# Patient Record
Sex: FEMALE | Race: WHITE | Employment: UNEMPLOYED | URBAN - METROPOLITAN AREA
[De-identification: names, ages, dates, MRNs, and addresses within clinical notes are randomized per-mention and may not be internally consistent; named-entity substitution may affect disease eponyms.]

---

## 2019-04-09 ENCOUNTER — HOSPITAL ENCOUNTER (INPATIENT)
Age: 44
LOS: 3 days | Discharge: HOME | DRG: 418 | End: 2019-04-12
Attending: STUDENT IN AN ORGANIZED HEALTH CARE EDUCATION/TRAINING PROGRAM | Admitting: INTERNAL MEDICINE
Payer: COMMERCIAL

## 2019-04-09 ENCOUNTER — APPOINTMENT (OUTPATIENT)
Dept: ULTRASOUND IMAGING | Age: 44
DRG: 418 | End: 2019-04-09
Attending: STUDENT IN AN ORGANIZED HEALTH CARE EDUCATION/TRAINING PROGRAM
Payer: COMMERCIAL

## 2019-04-09 ENCOUNTER — APPOINTMENT (OUTPATIENT)
Dept: MRI IMAGING | Age: 44
DRG: 418 | End: 2019-04-09
Attending: INTERNAL MEDICINE
Payer: COMMERCIAL

## 2019-04-09 DIAGNOSIS — K85.90 ACUTE PANCREATITIS, UNSPECIFIED COMPLICATION STATUS, UNSPECIFIED PANCREATITIS TYPE: Primary | ICD-10-CM

## 2019-04-09 DIAGNOSIS — K80.21 CALCULUS OF GALLBLADDER WITH BILIARY OBSTRUCTION BUT WITHOUT CHOLECYSTITIS: ICD-10-CM

## 2019-04-09 DIAGNOSIS — K85.10 ACUTE GALLSTONE PANCREATITIS: ICD-10-CM

## 2019-04-09 DIAGNOSIS — Z90.49 S/P LAPAROSCOPIC CHOLECYSTECTOMY: ICD-10-CM

## 2019-04-09 PROBLEM — K21.9 GASTROESOPHAGEAL REFLUX DISEASE WITHOUT ESOPHAGITIS: Status: ACTIVE | Noted: 2019-04-09

## 2019-04-09 PROBLEM — F32.A DEPRESSION: Status: ACTIVE | Noted: 2019-04-09

## 2019-04-09 PROBLEM — R03.0 ELEVATED BP WITHOUT DIAGNOSIS OF HYPERTENSION: Status: ACTIVE | Noted: 2019-04-09

## 2019-04-09 PROBLEM — F98.8 ATTENTION DEFICIT DISORDER (ADD) IN ADULT: Status: ACTIVE | Noted: 2019-04-09

## 2019-04-09 LAB
ALBUMIN SERPL-MCNC: 3.7 G/DL (ref 3.5–5)
ALBUMIN/GLOB SERPL: 1.1 {RATIO} (ref 1.1–2.2)
ALP SERPL-CCNC: 144 U/L (ref 45–117)
ALT SERPL-CCNC: 316 U/L (ref 12–78)
ANION GAP SERPL CALC-SCNC: 4 MMOL/L (ref 5–15)
APPEARANCE UR: ABNORMAL
AST SERPL-CCNC: 441 U/L (ref 15–37)
BACTERIA URNS QL MICRO: NEGATIVE /HPF
BASOPHILS # BLD: 0 K/UL (ref 0–0.1)
BASOPHILS NFR BLD: 0 % (ref 0–1)
BILIRUB SERPL-MCNC: 4.5 MG/DL (ref 0.2–1)
BILIRUB UR QL CFM: POSITIVE
BUN SERPL-MCNC: 12 MG/DL (ref 6–20)
BUN/CREAT SERPL: 16 (ref 12–20)
CALCIUM SERPL-MCNC: 9 MG/DL (ref 8.5–10.1)
CHLORIDE SERPL-SCNC: 107 MMOL/L (ref 97–108)
CO2 SERPL-SCNC: 28 MMOL/L (ref 21–32)
COLOR UR: ABNORMAL
COMMENT, HOLDF: NORMAL
CREAT SERPL-MCNC: 0.77 MG/DL (ref 0.55–1.02)
DIFFERENTIAL METHOD BLD: NORMAL
EOSINOPHIL # BLD: 0.2 K/UL (ref 0–0.4)
EOSINOPHIL NFR BLD: 3 % (ref 0–7)
EPITH CASTS URNS QL MICRO: ABNORMAL /LPF
ERYTHROCYTE [DISTWIDTH] IN BLOOD BY AUTOMATED COUNT: 14.1 % (ref 11.5–14.5)
GLOBULIN SER CALC-MCNC: 3.4 G/DL (ref 2–4)
GLUCOSE SERPL-MCNC: 86 MG/DL (ref 65–100)
GLUCOSE UR STRIP.AUTO-MCNC: NEGATIVE MG/DL
HCG UR QL: NEGATIVE
HCT VFR BLD AUTO: 39 % (ref 35–47)
HGB BLD-MCNC: 12.6 G/DL (ref 11.5–16)
HGB UR QL STRIP: NEGATIVE
IMM GRANULOCYTES # BLD AUTO: 0 K/UL (ref 0–0.04)
IMM GRANULOCYTES NFR BLD AUTO: 0 % (ref 0–0.5)
KETONES UR QL STRIP.AUTO: 15 MG/DL
LACTATE BLD-SCNC: 0.75 MMOL/L (ref 0.4–2)
LEUKOCYTE ESTERASE UR QL STRIP.AUTO: ABNORMAL
LIPASE SERPL-CCNC: >3000 U/L (ref 73–393)
LYMPHOCYTES # BLD: 0.9 K/UL (ref 0.8–3.5)
LYMPHOCYTES NFR BLD: 16 % (ref 12–49)
MCH RBC QN AUTO: 31.7 PG (ref 26–34)
MCHC RBC AUTO-ENTMCNC: 32.3 G/DL (ref 30–36.5)
MCV RBC AUTO: 98 FL (ref 80–99)
MONOCYTES # BLD: 0.4 K/UL (ref 0–1)
MONOCYTES NFR BLD: 7 % (ref 5–13)
NEUTS SEG # BLD: 4.4 K/UL (ref 1.8–8)
NEUTS SEG NFR BLD: 74 % (ref 32–75)
NITRITE UR QL STRIP.AUTO: POSITIVE
NRBC # BLD: 0 K/UL (ref 0–0.01)
NRBC BLD-RTO: 0 PER 100 WBC
PH UR STRIP: 6 [PH] (ref 5–8)
PLATELET # BLD AUTO: 226 K/UL (ref 150–400)
PMV BLD AUTO: 9.7 FL (ref 8.9–12.9)
POTASSIUM SERPL-SCNC: 4.1 MMOL/L (ref 3.5–5.1)
PROT SERPL-MCNC: 7.1 G/DL (ref 6.4–8.2)
PROT UR STRIP-MCNC: 30 MG/DL
RBC # BLD AUTO: 3.98 M/UL (ref 3.8–5.2)
RBC #/AREA URNS HPF: ABNORMAL /HPF (ref 0–5)
SAMPLES BEING HELD,HOLD: NORMAL
SODIUM SERPL-SCNC: 139 MMOL/L (ref 136–145)
SP GR UR REFRACTOMETRY: 1.02 (ref 1–1.03)
UR CULT HOLD, URHOLD: NORMAL
UROBILINOGEN UR QL STRIP.AUTO: 4 EU/DL (ref 0.2–1)
WBC # BLD AUTO: 5.9 K/UL (ref 3.6–11)
WBC URNS QL MICRO: ABNORMAL /HPF (ref 0–4)

## 2019-04-09 PROCEDURE — 96375 TX/PRO/DX INJ NEW DRUG ADDON: CPT

## 2019-04-09 PROCEDURE — 96374 THER/PROPH/DIAG INJ IV PUSH: CPT

## 2019-04-09 PROCEDURE — 74011250636 HC RX REV CODE- 250/636: Performed by: NURSE PRACTITIONER

## 2019-04-09 PROCEDURE — 85025 COMPLETE CBC W/AUTO DIFF WBC: CPT

## 2019-04-09 PROCEDURE — 36415 COLL VENOUS BLD VENIPUNCTURE: CPT

## 2019-04-09 PROCEDURE — 74011250636 HC RX REV CODE- 250/636: Performed by: STUDENT IN AN ORGANIZED HEALTH CARE EDUCATION/TRAINING PROGRAM

## 2019-04-09 PROCEDURE — 80053 COMPREHEN METABOLIC PANEL: CPT

## 2019-04-09 PROCEDURE — 74011250636 HC RX REV CODE- 250/636: Performed by: INTERNAL MEDICINE

## 2019-04-09 PROCEDURE — 83690 ASSAY OF LIPASE: CPT

## 2019-04-09 PROCEDURE — 81001 URINALYSIS AUTO W/SCOPE: CPT

## 2019-04-09 PROCEDURE — 74183 MRI ABD W/O CNTR FLWD CNTR: CPT

## 2019-04-09 PROCEDURE — 77030021566

## 2019-04-09 PROCEDURE — 65270000029 HC RM PRIVATE

## 2019-04-09 PROCEDURE — 76705 ECHO EXAM OF ABDOMEN: CPT

## 2019-04-09 PROCEDURE — 74011250636 HC RX REV CODE- 250/636: Performed by: RADIOLOGY

## 2019-04-09 PROCEDURE — 96361 HYDRATE IV INFUSION ADD-ON: CPT

## 2019-04-09 PROCEDURE — 83605 ASSAY OF LACTIC ACID: CPT

## 2019-04-09 PROCEDURE — 74011250637 HC RX REV CODE- 250/637: Performed by: INTERNAL MEDICINE

## 2019-04-09 PROCEDURE — A9585 GADOBUTROL INJECTION: HCPCS | Performed by: RADIOLOGY

## 2019-04-09 PROCEDURE — 99283 EMERGENCY DEPT VISIT LOW MDM: CPT

## 2019-04-09 PROCEDURE — 81025 URINE PREGNANCY TEST: CPT

## 2019-04-09 RX ORDER — SERTRALINE HYDROCHLORIDE 50 MG/1
100 TABLET, FILM COATED ORAL
Status: DISCONTINUED | OUTPATIENT
Start: 2019-04-09 | End: 2019-04-12 | Stop reason: HOSPADM

## 2019-04-09 RX ORDER — HYDROMORPHONE HYDROCHLORIDE 2 MG/ML
1 INJECTION, SOLUTION INTRAMUSCULAR; INTRAVENOUS; SUBCUTANEOUS
Status: COMPLETED | OUTPATIENT
Start: 2019-04-09 | End: 2019-04-09

## 2019-04-09 RX ORDER — NALOXONE HYDROCHLORIDE 0.4 MG/ML
0.4 INJECTION, SOLUTION INTRAMUSCULAR; INTRAVENOUS; SUBCUTANEOUS AS NEEDED
Status: DISCONTINUED | OUTPATIENT
Start: 2019-04-09 | End: 2019-04-12 | Stop reason: HOSPADM

## 2019-04-09 RX ORDER — ONDANSETRON 2 MG/ML
4 INJECTION INTRAMUSCULAR; INTRAVENOUS
Status: DISCONTINUED | OUTPATIENT
Start: 2019-04-09 | End: 2019-04-12 | Stop reason: HOSPADM

## 2019-04-09 RX ORDER — SODIUM CHLORIDE 0.9 % (FLUSH) 0.9 %
5-40 SYRINGE (ML) INJECTION AS NEEDED
Status: DISCONTINUED | OUTPATIENT
Start: 2019-04-09 | End: 2019-04-12 | Stop reason: HOSPADM

## 2019-04-09 RX ORDER — OMEPRAZOLE 10 MG/1
40 CAPSULE, DELAYED RELEASE ORAL DAILY
COMMUNITY

## 2019-04-09 RX ORDER — FLUOXETINE 10 MG/1
10 CAPSULE ORAL
COMMUNITY

## 2019-04-09 RX ORDER — DEXTROAMPHETAMINE SACCHARATE, AMPHETAMINE ASPARTATE, DEXTROAMPHETAMINE SULFATE AND AMPHETAMINE SULFATE 2.5; 2.5; 2.5; 2.5 MG/1; MG/1; MG/1; MG/1
TABLET ORAL 4 TIMES DAILY
COMMUNITY

## 2019-04-09 RX ORDER — SODIUM CHLORIDE, SODIUM LACTATE, POTASSIUM CHLORIDE, CALCIUM CHLORIDE 600; 310; 30; 20 MG/100ML; MG/100ML; MG/100ML; MG/100ML
250 INJECTION, SOLUTION INTRAVENOUS CONTINUOUS
Status: DISCONTINUED | OUTPATIENT
Start: 2019-04-09 | End: 2019-04-12

## 2019-04-09 RX ORDER — SODIUM CHLORIDE 9 MG/ML
1000 INJECTION, SOLUTION INTRAVENOUS CONTINUOUS
Status: DISCONTINUED | OUTPATIENT
Start: 2019-04-09 | End: 2019-04-12

## 2019-04-09 RX ORDER — ENOXAPARIN SODIUM 100 MG/ML
40 INJECTION SUBCUTANEOUS EVERY 24 HOURS
Status: DISCONTINUED | OUTPATIENT
Start: 2019-04-09 | End: 2019-04-09

## 2019-04-09 RX ORDER — ONDANSETRON 2 MG/ML
4 INJECTION INTRAMUSCULAR; INTRAVENOUS
Status: COMPLETED | OUTPATIENT
Start: 2019-04-09 | End: 2019-04-09

## 2019-04-09 RX ORDER — HYDROMORPHONE HYDROCHLORIDE 2 MG/ML
1 INJECTION, SOLUTION INTRAMUSCULAR; INTRAVENOUS; SUBCUTANEOUS
Status: DISCONTINUED | OUTPATIENT
Start: 2019-04-09 | End: 2019-04-12 | Stop reason: HOSPADM

## 2019-04-09 RX ORDER — SERTRALINE HYDROCHLORIDE 100 MG/1
100 TABLET, FILM COATED ORAL
COMMUNITY
End: 2021-03-04 | Stop reason: ALTCHOICE

## 2019-04-09 RX ORDER — ACETAMINOPHEN 325 MG/1
650 TABLET ORAL
Status: DISCONTINUED | OUTPATIENT
Start: 2019-04-09 | End: 2019-04-12 | Stop reason: HOSPADM

## 2019-04-09 RX ORDER — SODIUM CHLORIDE 0.9 % (FLUSH) 0.9 %
5-40 SYRINGE (ML) INJECTION EVERY 8 HOURS
Status: DISCONTINUED | OUTPATIENT
Start: 2019-04-09 | End: 2019-04-12 | Stop reason: HOSPADM

## 2019-04-09 RX ORDER — LORAZEPAM 2 MG/ML
1 INJECTION INTRAMUSCULAR
Status: DISCONTINUED | OUTPATIENT
Start: 2019-04-09 | End: 2019-04-12 | Stop reason: HOSPADM

## 2019-04-09 RX ORDER — FLUOXETINE 10 MG/1
10 CAPSULE ORAL
Status: DISCONTINUED | OUTPATIENT
Start: 2019-04-09 | End: 2019-04-12 | Stop reason: HOSPADM

## 2019-04-09 RX ADMIN — HYDROMORPHONE HYDROCHLORIDE 1 MG: 2 INJECTION INTRAMUSCULAR; INTRAVENOUS; SUBCUTANEOUS at 18:51

## 2019-04-09 RX ADMIN — HYDROMORPHONE HYDROCHLORIDE 1 MG: 2 INJECTION INTRAMUSCULAR; INTRAVENOUS; SUBCUTANEOUS at 14:30

## 2019-04-09 RX ADMIN — ONDANSETRON 4 MG: 2 INJECTION INTRAMUSCULAR; INTRAVENOUS at 19:45

## 2019-04-09 RX ADMIN — SODIUM CHLORIDE, SODIUM LACTATE, POTASSIUM CHLORIDE, AND CALCIUM CHLORIDE 250 ML: 600; 310; 30; 20 INJECTION, SOLUTION INTRAVENOUS at 17:59

## 2019-04-09 RX ADMIN — ONDANSETRON 4 MG: 2 INJECTION INTRAMUSCULAR; INTRAVENOUS at 14:30

## 2019-04-09 RX ADMIN — SODIUM CHLORIDE 1000 ML: 900 INJECTION, SOLUTION INTRAVENOUS at 18:00

## 2019-04-09 RX ADMIN — GADOBUTROL 7 ML: 604.72 INJECTION INTRAVENOUS at 21:28

## 2019-04-09 RX ADMIN — PROMETHAZINE HYDROCHLORIDE 25 MG: 25 INJECTION INTRAMUSCULAR; INTRAVENOUS at 16:18

## 2019-04-09 RX ADMIN — FLUOXETINE 10 MG: 10 CAPSULE ORAL at 21:57

## 2019-04-09 RX ADMIN — SERTRALINE HYDROCHLORIDE 100 MG: 50 TABLET ORAL at 21:57

## 2019-04-09 RX ADMIN — SODIUM CHLORIDE 1000 ML: 900 INJECTION, SOLUTION INTRAVENOUS at 21:58

## 2019-04-09 RX ADMIN — Medication 10 ML: at 21:57

## 2019-04-09 RX ADMIN — FAMOTIDINE 20 MG: 10 INJECTION, SOLUTION INTRAVENOUS at 21:57

## 2019-04-09 RX ADMIN — SODIUM CHLORIDE 1000 ML: 900 INJECTION, SOLUTION INTRAVENOUS at 14:31

## 2019-04-09 NOTE — ED TRIAGE NOTES
Pt arrives to ED with bilateral upper abdominal pain. Pt with nausea, diarrhea, denies vomiting, fever, or chills. Pt also with decreased urination. Pt with hx of pancreatitis and gastric sleeve.

## 2019-04-09 NOTE — ED NOTES
TRANSFER - OUT REPORT: 
 
Verbal report given to [address] [place] [address] RN(name) on Odalys Lepe  being transferred to Medical(unit) for routine progression of care Report consisted of patients Situation, Background, Assessment and  
Recommendations(SBAR). Information from the following report(s) SBAR was reviewed with the receiving nurse. Lines:  
Peripheral IV 04/09/19 Right Antecubital (Active) Site Assessment Clean, dry, & intact 4/9/2019  1:05 PM  
Phlebitis Assessment 0 4/9/2019  1:05 PM  
Infiltration Assessment 0 4/9/2019  1:05 PM  
Dressing Status Clean, dry, & intact 4/9/2019  1:05 PM  
Dressing Type Transparent;Tape 4/9/2019  1:05 PM  
Hub Color/Line Status Pink 4/9/2019  1:05 PM  
  
 
Opportunity for questions and clarification was provided. Patient transported with: 
 [facility]

## 2019-04-09 NOTE — ED PROVIDER NOTES
41 y.o. female with past medical history significant for pancreatitis who presents from home via private vehicle with chief complaint of abdominal pain. Patient began with \"severe\" abdominal pain radiating to back, nausea, and diarrhea yesterday that is similar to her previous episodes of pancreatitis. She was seen at [facility], given a Toradol shot, and referred to ED for possible admission. She was last admitted 09/2018 for pancreatitis. Patient notes episodes are normally triggered by liquor consumption, but she denies recent consumption - she has consumed white wine. She also noted decreased urinary frequency today, and trends rapid weight loss since last being admitted. Specifically denies vomiting, fever, and any other pain or symptoms. There are no other acute medical concerns at this time. Social hx: Never tobacco smoker; Occasional EtOH use; Denies hx EtOH abuse PCP: No primary care provider on file. Note written by Delonte Parsons, as dictated by Lisa Lira MD 12:52 PM 
 
The history is provided by the patient. No  was used. Pt denies fevers, chills, night sweats, chest pain, pressure, SOB, SMITH, PND, orthopnea, melena, hematuria, constipation, HA, dizziness, and syncope. No past medical history on file. No past surgical history on file. No family history on file. Social History Socioeconomic History  Marital status: SINGLE Spouse name: Not on file  Number of children: Not on file  Years of education: Not on file  Highest education level: Not on file Occupational History  Not on file Social Needs  Financial resource strain: Not on file  Food insecurity:  
  Worry: Not on file Inability: Not on file  Transportation needs:  
  Medical: Not on file Non-medical: Not on file Tobacco Use  Smoking status: Never Smoker Substance and Sexual Activity  Alcohol use:  Yes  
  Drug use: Not on file  Sexual activity: Not on file Lifestyle  Physical activity:  
  Days per week: Not on file Minutes per session: Not on file  Stress: Not on file Relationships  Social connections:  
  Talks on phone: Not on file Gets together: Not on file Attends Synagogue service: Not on file Active member of club or organization: Not on file Attends meetings of clubs or organizations: Not on file Relationship status: Not on file  Intimate partner violence:  
  Fear of current or ex partner: Not on file Emotionally abused: Not on file Physically abused: Not on file Forced sexual activity: Not on file Other Topics Concern  Not on file Social History Narrative  Not on file ALLERGIES: Patient has no known allergies. Review of Systems Constitutional: Positive for unexpected weight change. Negative for activity change, diaphoresis, fatigue and fever. HENT: Negative for congestion and sore throat. Eyes: Negative for photophobia and visual disturbance. Respiratory: Negative for chest tightness and shortness of breath. Cardiovascular: Negative for chest pain, palpitations and leg swelling. Gastrointestinal: Positive for abdominal pain, diarrhea and nausea. Negative for blood in stool, constipation and vomiting. Genitourinary: Positive for frequency (\"decreased\"). Negative for difficulty urinating, dysuria, flank pain and hematuria. Musculoskeletal: Positive for back pain. Neurological: Negative for dizziness, syncope, numbness and headaches. All other systems reviewed and are negative. Vitals:  
 04/09/19 1254 04/09/19 1629 BP: (!) 151/97 Pulse: 76 Resp: 18 Temp: 98.1 °F (36.7 °C) SpO2: 96% 96% Weight: 77.1 kg (170 lb) Height: 5' 8\" (1.727 m) Physical Exam  
Constitutional: She is oriented to person, place, and time. She appears well-developed and well-nourished. No distress. HENT:  
Head: Normocephalic and atraumatic. Nose: Nose normal.  
Mouth/Throat: Oropharynx is clear and moist. No oropharyngeal exudate. Eyes: Conjunctivae and EOM are normal. Right eye exhibits no discharge. Left eye exhibits no discharge. No scleral icterus. Neck: Normal range of motion. Neck supple. No JVD present. No tracheal deviation present. No thyromegaly present. Cardiovascular: Normal rate, regular rhythm, normal heart sounds and intact distal pulses. Exam reveals no gallop and no friction rub. No murmur heard. Pulmonary/Chest: Effort normal and breath sounds normal. No stridor. No respiratory distress. She has no wheezes. She has no rales. She exhibits no tenderness. Abdominal: Bowel sounds are normal. She exhibits no distension and no mass. There is tenderness in the epigastric area. There is no rebound. Pt exhibits epigastric tenderness. Musculoskeletal: Normal range of motion. She exhibits no edema or tenderness. Lymphadenopathy:  
  She has no cervical adenopathy. Neurological: She is alert and oriented to person, place, and time. No cranial nerve deficit. Coordination normal.  
Skin: Skin is warm and dry. No rash noted. She is not diaphoretic. No erythema. No pallor. Psychiatric: She has a normal mood and affect. Her behavior is normal. Judgment and thought content normal.  
Nursing note and vitals reviewed. Note written by Delonte Pearson, as dictated by Karthikeyan Altamirano MD 12:52 PM 
 
MDM Number of Diagnoses or Management Options Diagnosis management comments:  
 * routine laboratory data and UA 
 * IVF * Consult to GI 
 * Analgesia and Phenergan Amount and/or Complexity of Data Reviewed Clinical lab tests: ordered and reviewed Tests in the radiology section of CPT®: reviewed and ordered Discussion of test results with the performing providers: yes Review and summarize past medical records: yes Discuss the patient with other providers: yes Risk of Complications, Morbidity, and/or Mortality General comments:  
 - stable, ambulatory pt in NAD Patient Progress Patient progress: stable Procedures CONSULT NOTE:  
4:36 PM 
Violeta Cedillo NP spoke with Dr. Lala Jones, Specialty: GI 
Discussed pt's hx, disposition, and available diagnostic and imaging results. Reviewed care plans. Consultant agrees with plans as outlined. MD to see. Violeta Cedillo NP 
 
  
 
CONSULT NOTE:  
4:37 PM 
Violeta Cedillo NP spoke with Dr. Tawanda Briceno MD, Specialty: Hospitalist 
Discussed pt's hx, disposition, and available diagnostic and imaging results. Reviewed care plans. Consultant agrees with plans as outlined. Admit to inpat. Violeta Cedillo NP 
 
 
4:37 PM 
Patient is being admitted to the hospital.  The results of their tests and reasons for their admission have been discussed with them and/or available family. They convey agreement and understanding for the need to be admitted and for their admission diagnosis. Consultation has been made with the inpatient physician specialist for hospitalization. LABORATORY TESTS: 
Recent Results (from the past 12 hour(s)) CBC WITH AUTOMATED DIFF Collection Time: 04/09/19  1:06 PM  
Result Value Ref Range WBC 5.9 3.6 - 11.0 K/uL  
 RBC 3.98 3.80 - 5.20 M/uL  
 HGB 12.6 11.5 - 16.0 g/dL HCT 39.0 35.0 - 47.0 % MCV 98.0 80.0 - 99.0 FL  
 MCH 31.7 26.0 - 34.0 PG  
 MCHC 32.3 30.0 - 36.5 g/dL  
 RDW 14.1 11.5 - 14.5 % PLATELET 548 108 - 211 K/uL MPV 9.7 8.9 - 12.9 FL  
 NRBC 0.0 0  WBC ABSOLUTE NRBC 0.00 0.00 - 0.01 K/uL NEUTROPHILS 74 32 - 75 % LYMPHOCYTES 16 12 - 49 % MONOCYTES 7 5 - 13 % EOSINOPHILS 3 0 - 7 % BASOPHILS 0 0 - 1 % IMMATURE GRANULOCYTES 0 0.0 - 0.5 % ABS. NEUTROPHILS 4.4 1.8 - 8.0 K/UL  
 ABS. LYMPHOCYTES 0.9 0.8 - 3.5 K/UL  
 ABS. MONOCYTES 0.4 0.0 - 1.0 K/UL ABS. EOSINOPHILS 0.2 0.0 - 0.4 K/UL  
 ABS. BASOPHILS 0.0 0.0 - 0.1 K/UL  
 ABS. IMM. GRANS. 0.0 0.00 - 0.04 K/UL  
 DF AUTOMATED METABOLIC PANEL, COMPREHENSIVE Collection Time: 04/09/19  1:06 PM  
Result Value Ref Range Sodium 139 136 - 145 mmol/L Potassium 4.1 3.5 - 5.1 mmol/L Chloride 107 97 - 108 mmol/L  
 CO2 28 21 - 32 mmol/L Anion gap 4 (L) 5 - 15 mmol/L Glucose 86 65 - 100 mg/dL BUN 12 6 - 20 MG/DL Creatinine 0.77 0.55 - 1.02 MG/DL  
 BUN/Creatinine ratio 16 12 - 20 GFR est AA >60 >60 ml/min/1.73m2 GFR est non-AA >60 >60 ml/min/1.73m2 Calcium 9.0 8.5 - 10.1 MG/DL Bilirubin, total 4.5 (H) 0.2 - 1.0 MG/DL  
 ALT (SGPT) 316 (H) 12 - 78 U/L  
 AST (SGOT) 441 (H) 15 - 37 U/L Alk. phosphatase 144 (H) 45 - 117 U/L Protein, total 7.1 6.4 - 8.2 g/dL Albumin 3.7 3.5 - 5.0 g/dL Globulin 3.4 2.0 - 4.0 g/dL A-G Ratio 1.1 1.1 - 2.2 LIPASE Collection Time: 04/09/19  1:06 PM  
Result Value Ref Range Lipase >3,000 (H) 73 - 393 U/L  
SAMPLES BEING HELD Collection Time: 04/09/19  1:06 PM  
Result Value Ref Range SAMPLES BEING HELD 1RED,1SST   
 COMMENT Add-on orders for these samples will be processed based on acceptable specimen integrity and analyte stability, which may vary by analyte. URINALYSIS W/MICROSCOPIC Collection Time: 04/09/19  1:12 PM  
Result Value Ref Range Color KEEGAN Appearance TURBID (A) CLEAR Specific gravity 1.025 1.003 - 1.030    
 pH (UA) 6.0 5.0 - 8.0 Protein 30 (A) NEG mg/dL Glucose NEGATIVE  NEG mg/dL Ketone 15 (A) NEG mg/dL Blood NEGATIVE  NEG Urobilinogen 4.0 (H) 0.2 - 1.0 EU/dL Nitrites POSITIVE (A) NEG Leukocyte Esterase MODERATE (A) NEG    
 WBC 0-4 0 - 4 /hpf  
 RBC 0-5 0 - 5 /hpf Epithelial cells FEW FEW /lpf Bacteria NEGATIVE  NEG /hpf URINE CULTURE HOLD SAMPLE Collection Time: 04/09/19  1:12 PM  
Result Value Ref Range Urine culture hold URINE ON HOLD IN MICROBIOLOGY DEPT FOR 3 DAYS. IF UNPRESERVED URINE IS SUBMITTED, IT CANNOT BE USED FOR ADDITIONAL TESTING AFTER 24 HRS, RECOLLECTION WILL BE REQUIRED. BILIRUBIN, CONFIRM Collection Time: 04/09/19  1:12 PM  
Result Value Ref Range Bilirubin UA, confirm POSITIVE (A) NEG    
HCG URINE, QL. - POC Collection Time: 04/09/19  1:15 PM  
Result Value Ref Range Pregnancy test,urine (POC) NEGATIVE  NEG    
POC LACTIC ACID Collection Time: 04/09/19  4:17 PM  
Result Value Ref Range Lactic Acid (POC) 0.75 0.40 - 2.00 mmol/L IMAGING RESULTS: 
US ABD LTD Final Result IMPRESSION: Cholecystolithiasis with tenderness on direct insonation of the  
gallbladder but no other sonographic signs of cholecystitis. : Bilateral duct  
dilated to 10 mm consider further evaluation with MRCP/ERCP. [id number] Result Date: 4/9/2019 EXAM: Right upper quadrant limited abdominal ultrasound. CLINICAL INDICATION:  Evaluate for cholelithiasis. TECHNIQUE: High-resolution selected color flow evaluation of the right upper quadrant performed. COMPARISON:  None. FINDINGS: The gallbladder contains stones within the lumen at the gallbladder neck but otherwise appears normal with no wall thickening, pericholecystic fluid, or other abnormality. The patient reported pain with direct insonation of the gallbladder. The common bile duct is dilated and measures 10 mm. The liver is unremarkable with no focal lesion or intrahepatic biliary ductal dilation. There is hepatopetal portal venous flow within the liver. The pancreas appears normal with no identified mass or ductal dilation. The right kidney appears normal with no identified calculus, mass, or hydronephrosis. Right renal length measures 10.1 cm.   
 
IMPRESSION: Cholecystolithiasis with tenderness on direct insonation of the gallbladder but no other sonographic signs of cholecystitis. : Bilateral duct dilated to 10 mm consider further evaluation with MRCP/ERCP. MEDICATIONS GIVEN: 
Medications  
lactated ringers bolus infusion 1,000 mL (1,000 mL IntraVENous Continued On Admission 4/9/19 1619)  
0.9% sodium chloride infusion 1,000 mL (has no administration in time range)  
sodium chloride (NS) flush 5-40 mL (has no administration in time range)  
sodium chloride (NS) flush 5-40 mL (has no administration in time range)  
naloxone (NARCAN) injection 0.4 mg (has no administration in time range) LORazepam (ATIVAN) injection 1 mg (has no administration in time range)  
enoxaparin (LOVENOX) injection 40 mg (has no administration in time range) HYDROmorphone (PF) (DILAUDID) injection 1 mg (has no administration in time range)  
acetaminophen (TYLENOL) tablet 650 mg (has no administration in time range)  
ondansetron (ZOFRAN) injection 4 mg (has no administration in time range)  
lactated Ringers infusion 250 mL (has no administration in time range)  
sodium chloride 0.9 % bolus infusion 1,000 mL (0 mL IntraVENous IV Completed 4/9/19 1619)  
ondansetron (ZOFRAN) injection 4 mg (4 mg IntraVENous Given 4/9/19 1430) HYDROmorphone (PF) (DILAUDID) injection 1 mg (1 mg IntraVENous Given 4/9/19 1430) promethazine (PHENERGAN) 25 mg in NS IVPB (25 mg IntraVENous New Bag 4/9/19 1618) IMPRESSION: 
1. Acute pancreatitis, unspecified complication status, unspecified pancreatitis type PLAN: 
1.  Admit to [facility], [address], [place] 
4:37 PM

## 2019-04-09 NOTE — PROGRESS NOTES
[facility]: MED RECONCILIATION Comments/Recommendations:  
Patient alert and oriented for medication reconciliation and knowledgeable regarding medications. Patient does not think she took any medications today, took all yesterday. Confirmed NKDA and updated preferred pharmacy as [facility]. Medications added:  
 
none Medications removed: 
 
none Medications adjusted: Added directions for Adderall as BID PRN-patient only takes when working Information obtained from: patient, Rx query Allergies: Patient has no known allergies. Prior to Admission Medications:  
 
Medication Documentation Review Audit Reviewed by Clyde Hearn (Pharmacist) on 04/09/19 at 2413 Medication Sig Documenting Provider Last Dose Status Taking?  
dextroamphetamine-amphetamine (ADDERALL) 10 mg tablet Take 10 mg by mouth two (2) times daily as needed. Johana Streeter MD 4/2/2019 Active Yes Med Note (MARÍA DEGROOT Apr 9, 2019  3:47 PM) Patient only takes when she is working. FLUoxetine (PROZAC) 10 mg capsule Take 10 mg by mouth nightly. Johana Streeter MD 4/8/2019 PM Active Yes  
omeprazole (PRILOSEC) 10 mg capsule Take 10 mg by mouth daily. Johana Streeter MD 4/8/2019 AM Active Yes  
sertraline (ZOLOFT) 100 mg tablet Take 100 mg by mouth nightly. Johana Streeter MD 4/8/2019 PM Active Yes Thank Washington Dorsey, PharmD, BCPS   Contact: [phone]

## 2019-04-09 NOTE — H&P
[address] 19 
[phone] Admission History and Physical 
 
 
NAME:              Marion Peña :   1975 MRN:  [id number] PCP:  Jennelle Cabot, MD  
 
Date:     2019 Chief  Complaint: Acute abdominal pain History Of Presenting Illness: Ms. Marlon Staples is a 41 y.o. female who is being admitted for acute gallstone pancreatitis. Ms. Marlon Staples presented to our Emergency Department today complaining of a sudden onset of severe upper abdominal aching pain, radiating to her back and associated with nausea but no vomiting. She denies any chills or fever but has felt generalized itchiness. She says she only drinks alcohol socially. In the ED, she was found to have acute pancreatitis with an elevated lipase and an abdominal ultrasound showed cholecystolithiasis with tenderness on direct insonation of the gallbladder but no other sonographic signs of cholecystitis. : Bilateral duct dilated to 10 mm consider further evaluation with MRCP/ERCP. She will be admitted for further management. No Known Allergies Prior to Admission medications Medication Sig Start Date End Date Taking? Authorizing Provider  
sertraline (ZOLOFT) 100 mg tablet Take 100 mg by mouth nightly. Yes Ferdinand, MD Johana  
FLUoxetine (PROZAC) 10 mg capsule Take 10 mg by mouth nightly. Yes Ferdinand, MD Johana  
dextroamphetamine-amphetamine (ADDERALL) 10 mg tablet Take 10 mg by mouth two (2) times daily as needed. Yes Ferdinand, MD Johana  
omeprazole (PRILOSEC) 10 mg capsule Take 10 mg by mouth daily. Yes Ferdinand, MD Johana  
 
 
Past Medical History:  
Diagnosis Date  Gastrointestinal disorder  Hypertension  Psychiatric disorder Past Surgical History:  
Procedure Laterality Date  HX GI    
 gastric sleeve Social History Tobacco Use  Smoking status: Never Smoker  Smokeless tobacco: Never Used Substance Use Topics  Alcohol use: Yes Family History Problem Relation Age of Onset  Diabetes Neg Hx  Hypertension Neg Hx Review of Systems: 
Constitutional ROS: no fever, chills, rigors or night sweats Respiratory ROS: no cough, sputum, hemoptysis, dyspnea or pleuritic pain. Cardiovascular ROS: no chest pain, palpitations, orthopnea, PND or syncope Endocrine ROS: no polydispsia, polyuria, heat or cold intolerance or major weight change. Gastrointestinal ROS: no dysphagia but abdominal pain, nausea and no vomiting Genito-Urinary ROS: no dysuria, frequency, hematuria, retention or flank pain Musculoskeletal ROS: no joint pain, swelling or muscular tenderness Neurological ROS: no headache, confusion, focal weakness or any other neurological symptoms Psychiatric ROS: no depression, anxiety, mood swings Dermatological ROS: no rash, pruritis, or urticaria Heme-Lymph ROS: no swollen glands, bleeding Examination: 
 
Constitutional:   
Visit Vitals BP (!) 145/94 Pulse 76 Temp 98.1 °F (36.7 °C) Resp 18 Ht 5' 8\" (1.727 m) Wt 77.1 kg (170 lb) LMP 03/19/2019 (Approximate) SpO2 98% BMI 25.85 kg/m² General:  Weak and ill looking patient in no acute distress Eyes: icteric conjunctivae, PERRLA with no discharge. Normal eye movements Ear, Nose, Mouth & Throat: No ottorrhea, rhinorrhea, non tender sinuses, dry mucous membranes Respiratory:  No accessory muscle use, clear breath sounds without crackles or wheezes Cardiovascular:  No JVD or murmurs, regular and normal S1, S2 without thrills, bruits or peripheral edema. GI & :  Soft abdomen, RUQ discomfort, non-distended, normoactive bowel sounds with no palpable organomegaly Heme:  No cervical or axillary adenopathy. Musculoskeletal:  No cyanosis, clubbing, atrophy or deformities Skin:  No rashes, bruising or ulcers Neurological: Awake and alert, speech is clear, CNs 2-12 are grossly intact and otherwise non focal 
Psychiatric:  Has a good insight and is oriented x 3 
________________________________________________________________________ Data Review: 
 
Labs: 
 
Recent Labs 04/09/19 
1306 WBC 5.9 HGB 12.6 HCT 39.0  Recent Labs 04/09/19 
1306   
K 4.1  CO2 28  
GLU 86 BUN 12  
CREA 0.77 CA 9.0 ALB 3.7 SGOT 441* * No components found for: [place] No results for input(s): PH, PCO2, PO2, HCO3, FIO2 in the last 72 hours. No results for input(s): INR in the last 72 hours. No lab exists for component: INREXT Radiological Studies:   
 
Abdominal ultrasound - Cholecystolithiasis with tenderness on direct insonation of the gallbladder but no other sonographic signs of cholecystitis. : Bilateral duct dilated to 10 mm consider further evaluation with MRCP/ERCP. Other Medical tests: 
 
Personally reviewed  Telemetry - NSR Assessment & Impression: Ms. Ashli Weinstein is a 41 y.o. female being evaluated for:  
 
Principal Problem: 
  Acute gallstone pancreatitis (4/9/2019) Active Problems: 
  Calculus of gallbladder with biliary obstruction but without cholecystitis (4/9/2019) Gastroesophageal reflux disease without esophagitis (4/9/2019) Depression (4/9/2019) Attention deficit disorder (ADD) in adult (4/9/2019) Elevated BP without diagnosis of hypertension (4/9/2019) Plan of management: 
 
Acute gallstone pancreatitis (4/9/2019): admit to hospital. Keep NPO. IV fluids. IV opioids for pain control. IV anti emetics. Consult GI and general surgery Calculus of gallbladder with biliary obstruction but without cholecystitis (4/9/2019) / Elevated LFTs/ Hyperbilirubinemia POA: likely has a CBD stone.  GI to review and will decide on ERCP vs MRCP 
 
 Gastroesophageal reflux disease without esophagitis (4/9/2019): start IV pepcid Depression (4/9/2019) / Attention deficit disorder (ADD) in adult (4/9/2019): resume home medications once verified Elevated BP without diagnosis of hypertension (4/9/2019): monitor. IV Hydralazine as needed for now Code Status:  Full Surrogate decision maker: Family Risk of deterioration: high Total time spent for the care of the patient: 79 Minutes Care Plan discussed with: Patient, Nursing Staff, ED physician and Dr Yelitza Lopez who will assume further care Discussed:  Code Status, Care Plan and D/C Planning Prophylaxis:  Lovenox and H2B/PPI Probable Disposition:  Home w/Family 
        
___________________________________________________ Attending Physician: Mayi Stark MD

## 2019-04-09 NOTE — CONSULTS
Gastroenterology Consult     Referring Physician: Essie Matthew    Consult Date: 4/9/2019     Subjective:  Pain, vomiting     Chief Complaint: pain, vomiting    History of Present Illness: Carlos Alberto Quiñonez is a 41 y.o. female who is seen in consultation for pancreatitis. Past history includes pancreatitis 2018; she does not recall a definite diagnosis as to origin problem, states symptoms recurred yesterday with acute onset severe upper pain, worsened by oral intake asociated vomiting without fever, visible blood loss.   Here in ER both U/S and blood labs abnormal.    Past Medical History:   Diagnosis Date    Gastrointestinal disorder     Hypertension     Psychiatric disorder      Past Surgical History:   Procedure Laterality Date    HX GI      gastric sleeve      Family History   Problem Relation Age of Onset    Diabetes Neg Hx     Hypertension Neg Hx      Social History     Tobacco Use    Smoking status: Never Smoker    Smokeless tobacco: Never Used   Substance Use Topics    Alcohol use: Yes      No Known Allergies  Current Facility-Administered Medications   Medication Dose Route Frequency    lactated ringers bolus infusion 1,000 mL  1,000 mL IntraVENous ONCE    0.9% sodium chloride infusion 1,000 mL  1,000 mL IntraVENous CONTINUOUS    sodium chloride (NS) flush 5-40 mL  5-40 mL IntraVENous Q8H    sodium chloride (NS) flush 5-40 mL  5-40 mL IntraVENous PRN    naloxone (NARCAN) injection 0.4 mg  0.4 mg IntraVENous PRN    LORazepam (ATIVAN) injection 1 mg  1 mg IntraVENous Q6H PRN    enoxaparin (LOVENOX) injection 40 mg  40 mg SubCUTAneous Q24H    HYDROmorphone (PF) (DILAUDID) injection 1 mg  1 mg IntraVENous Q4H PRN    acetaminophen (TYLENOL) tablet 650 mg  650 mg Oral Q4H PRN    ondansetron (ZOFRAN) injection 4 mg  4 mg IntraVENous Q4H PRN    lactated Ringers infusion 250 mL  250 mL IntraVENous CONTINUOUS     Current Outpatient Medications   Medication Sig    sertraline (ZOLOFT) 100 mg tablet Take 100 mg by mouth nightly.  FLUoxetine (PROZAC) 10 mg capsule Take 10 mg by mouth nightly.  dextroamphetamine-amphetamine (ADDERALL) 10 mg tablet Take 10 mg by mouth two (2) times daily as needed.  omeprazole (PRILOSEC) 10 mg capsule Take 10 mg by mouth daily. Review of Systems:  A detailed 10 organ review of systems is obtained with pertinent positives as listed in the History of Present Illness and Past Medical History. All others are negative. Objective:     Physical Exam:  Visit Vitals  BP (!) 145/94   Pulse 76   Temp 98.1 °F (36.7 °C)   Resp 18   Ht 5' 8\" (1.727 m)   Wt 77.1 kg (170 lb)   LMP 03/19/2019 (Approximate)   SpO2 98%   BMI 25.85 kg/m²        Skin:  Extremities and face reveal no rashes. No morgan erythema. No telangiectasias on the chest wall. Tattoos   HEENT: Sclerae anicteric. Extra-occular muscles are intact. No oral ulcers. No abnormal pigmentation of the lips. The neck is supple. Cardiovascular: Regular rate and rhythm. No murmurs, gallops, or rubs. PMI nondisplaced. Carotids without bruits. Respiratory:  Comfortable breathing with no accessory muscle use. Clear breath sounds with no wheezes, rales, or rhonchi. GI:  Abdomen nondistended, soft, and nontender. No bowel sounds. No enlargement of the liver or spleen. No masses palpable. Musculoskeletal:  No pitting edema of the lower legs. Extremities have good range of motion. Neurological:  Gross memory appears intact. Patient is alert and oriented. Psychiatric:  Mood appears appropriate with judgement intact. Lymphatic:  No cervical or supraclavicular adenopathy.     Lab/Data Review:  BMP:   Lab Results   Component Value Date/Time     04/09/2019 01:06 PM    K 4.1 04/09/2019 01:06 PM     04/09/2019 01:06 PM    CO2 28 04/09/2019 01:06 PM    AGAP 4 (L) 04/09/2019 01:06 PM    GLU 86 04/09/2019 01:06 PM    BUN 12 04/09/2019 01:06 PM    CREA 0.77 04/09/2019 01:06 PM    GFRAA >60 04/09/2019 01:06 PM    GFRNA >60 04/09/2019 01:06 PM     CMP:   Lab Results   Component Value Date/Time     04/09/2019 01:06 PM    K 4.1 04/09/2019 01:06 PM     04/09/2019 01:06 PM    CO2 28 04/09/2019 01:06 PM    AGAP 4 (L) 04/09/2019 01:06 PM    GLU 86 04/09/2019 01:06 PM    BUN 12 04/09/2019 01:06 PM    CREA 0.77 04/09/2019 01:06 PM    GFRAA >60 04/09/2019 01:06 PM    GFRNA >60 04/09/2019 01:06 PM    CA 9.0 04/09/2019 01:06 PM    ALB 3.7 04/09/2019 01:06 PM    TP 7.1 04/09/2019 01:06 PM    GLOB 3.4 04/09/2019 01:06 PM    AGRAT 1.1 04/09/2019 01:06 PM    SGOT 441 (H) 04/09/2019 01:06 PM     (H) 04/09/2019 01:06 PM     Pancreatic Markers:   Lab Results   Component Value Date/Time    LPSE >3,000 (H) 04/09/2019 01:06 PM     U/S:  Cholecystolithiasis with tenderness on direct insonation of the  gallbladder but no other sonographic signs of cholecystitis. : Bilateral duct  dilated to 10 mm      Assessment/Plan:     Principal Problem:    Acute gallstone pancreatitis (4/9/2019)    Active Problems:    Calculus of gallbladder with biliary obstruction but without cholecystitis (4/9/2019)      Gastroesophageal reflux disease without esophagitis (4/9/2019)      Depression (4/9/2019)      Attention deficit disorder (ADD) in adult (4/9/2019)      Elevated BP without diagnosis of hypertension (4/9/2019)         Recommend:    D/C enoxaparin  MRCP  I have discussed possible ERCP, sphincterotomy, stone extraction, stent placement biopsy, alternatives, potential complications including but not limited to pancreatitis, bleeding, perforation requiring operative repair and/or blood transfusions. All questions answered.

## 2019-04-09 NOTE — PROGRESS NOTES
1633- TRANSFER - IN REPORT: 
 
Verbal report received from [facility] (name) on Odalys Lepe  being received from ED(unit) for routine progression of care Report consisted of patients Situation, Background, Assessment and  
Recommendations(SBAR). Information from the following report(s) SBAR, Kardex, Intake/Output, MAR and Recent Results was reviewed with the receiving nurse. Opportunity for questions and clarification was provided. Assessment completed upon patients arrival to unit and care assumed. 1735- Patient received on unit, vs obtained, pt states pain 6/10 in abd. Primary Nurse Jessica Haas and Rajwinder Littlejohn RN performed a dual skin assessment on this patient Impairment noted- see wound doc flow sheet Ulices score is 21 
 
 
1930- Bedside and Verbal shift change report given to SARITA Moore (oncoming nurse) by David Grullon (offgoing nurse). Report included the following information SBAR, Kardex, Intake/Output, MAR and Recent Results.

## 2019-04-09 NOTE — ED NOTES
TRANSFER - OUT REPORT: 
 
Verbal report given to [address] [place] [address] RN(name) on Alonzo Nguyen  being transferred to Medical(unit) for routine progression of care Report consisted of patients Situation, Background, Assessment and  
Recommendations(SBAR). Information from the following report(s) SBAR was reviewed with the receiving nurse. Lines:  
Peripheral IV 04/09/19 Right Antecubital (Active) Site Assessment Clean, dry, & intact 4/9/2019  1:05 PM  
Phlebitis Assessment 0 4/9/2019  1:05 PM  
Infiltration Assessment 0 4/9/2019  1:05 PM  
Dressing Status Clean, dry, & intact 4/9/2019  1:05 PM  
Dressing Type Transparent;Tape 4/9/2019  1:05 PM  
Hub Color/Line Status Pink 4/9/2019  1:05 PM  
  
 
Opportunity for questions and clarification was provided. Patient transported with: 
 OOgave

## 2019-04-10 ENCOUNTER — ANESTHESIA EVENT (OUTPATIENT)
Dept: SURGERY | Age: 44
DRG: 418 | End: 2019-04-10
Payer: COMMERCIAL

## 2019-04-10 LAB
ALBUMIN SERPL-MCNC: 2.8 G/DL (ref 3.5–5)
ALBUMIN/GLOB SERPL: 1 {RATIO} (ref 1.1–2.2)
ALP SERPL-CCNC: 109 U/L (ref 45–117)
ALT SERPL-CCNC: 196 U/L (ref 12–78)
ANION GAP SERPL CALC-SCNC: 5 MMOL/L (ref 5–15)
AST SERPL-CCNC: 190 U/L (ref 15–37)
BILIRUB SERPL-MCNC: 3.1 MG/DL (ref 0.2–1)
BUN SERPL-MCNC: 9 MG/DL (ref 6–20)
BUN/CREAT SERPL: 15 (ref 12–20)
CALCIUM SERPL-MCNC: 8 MG/DL (ref 8.5–10.1)
CHLORIDE SERPL-SCNC: 111 MMOL/L (ref 97–108)
CHOLEST SERPL-MCNC: 109 MG/DL
CO2 SERPL-SCNC: 24 MMOL/L (ref 21–32)
CREAT SERPL-MCNC: 0.59 MG/DL (ref 0.55–1.02)
ERYTHROCYTE [DISTWIDTH] IN BLOOD BY AUTOMATED COUNT: 14.4 % (ref 11.5–14.5)
GLOBULIN SER CALC-MCNC: 2.7 G/DL (ref 2–4)
GLUCOSE SERPL-MCNC: 82 MG/DL (ref 65–100)
HCT VFR BLD AUTO: 31.1 % (ref 35–47)
HDLC SERPL-MCNC: 56 MG/DL
HDLC SERPL: 1.9 {RATIO} (ref 0–5)
HGB BLD-MCNC: 9.9 G/DL (ref 11.5–16)
LDLC SERPL CALC-MCNC: 43 MG/DL (ref 0–100)
LIPASE SERPL-CCNC: >3000 U/L (ref 73–393)
LIPID PROFILE,FLP: NORMAL
MAGNESIUM SERPL-MCNC: 1.8 MG/DL (ref 1.6–2.4)
MCH RBC QN AUTO: 31.4 PG (ref 26–34)
MCHC RBC AUTO-ENTMCNC: 31.8 G/DL (ref 30–36.5)
MCV RBC AUTO: 98.7 FL (ref 80–99)
NRBC # BLD: 0 K/UL (ref 0–0.01)
NRBC BLD-RTO: 0 PER 100 WBC
PHOSPHATE SERPL-MCNC: 3.4 MG/DL (ref 2.6–4.7)
PLATELET # BLD AUTO: 165 K/UL (ref 150–400)
PMV BLD AUTO: 9.7 FL (ref 8.9–12.9)
POTASSIUM SERPL-SCNC: 4.1 MMOL/L (ref 3.5–5.1)
PROT SERPL-MCNC: 5.5 G/DL (ref 6.4–8.2)
RBC # BLD AUTO: 3.15 M/UL (ref 3.8–5.2)
SODIUM SERPL-SCNC: 140 MMOL/L (ref 136–145)
TRIGL SERPL-MCNC: 50 MG/DL (ref ?–150)
VLDLC SERPL CALC-MCNC: 10 MG/DL
WBC # BLD AUTO: 5.4 K/UL (ref 3.6–11)

## 2019-04-10 PROCEDURE — 65270000029 HC RM PRIVATE

## 2019-04-10 PROCEDURE — 84100 ASSAY OF PHOSPHORUS: CPT

## 2019-04-10 PROCEDURE — C9113 INJ PANTOPRAZOLE SODIUM, VIA: HCPCS | Performed by: INTERNAL MEDICINE

## 2019-04-10 PROCEDURE — 80061 LIPID PANEL: CPT

## 2019-04-10 PROCEDURE — 83690 ASSAY OF LIPASE: CPT

## 2019-04-10 PROCEDURE — 80053 COMPREHEN METABOLIC PANEL: CPT

## 2019-04-10 PROCEDURE — 83735 ASSAY OF MAGNESIUM: CPT

## 2019-04-10 PROCEDURE — 36415 COLL VENOUS BLD VENIPUNCTURE: CPT

## 2019-04-10 PROCEDURE — 74011250637 HC RX REV CODE- 250/637: Performed by: INTERNAL MEDICINE

## 2019-04-10 PROCEDURE — 74011250636 HC RX REV CODE- 250/636: Performed by: INTERNAL MEDICINE

## 2019-04-10 PROCEDURE — 85027 COMPLETE CBC AUTOMATED: CPT

## 2019-04-10 RX ADMIN — ACETAMINOPHEN 650 MG: 325 TABLET ORAL at 05:13

## 2019-04-10 RX ADMIN — ONDANSETRON 4 MG: 2 INJECTION INTRAMUSCULAR; INTRAVENOUS at 05:13

## 2019-04-10 RX ADMIN — Medication 10 ML: at 21:43

## 2019-04-10 RX ADMIN — SERTRALINE HYDROCHLORIDE 100 MG: 50 TABLET ORAL at 21:42

## 2019-04-10 RX ADMIN — ONDANSETRON 4 MG: 2 INJECTION INTRAMUSCULAR; INTRAVENOUS at 18:57

## 2019-04-10 RX ADMIN — HYDROMORPHONE HYDROCHLORIDE 1 MG: 2 INJECTION INTRAMUSCULAR; INTRAVENOUS; SUBCUTANEOUS at 05:12

## 2019-04-10 RX ADMIN — ACETAMINOPHEN 650 MG: 325 TABLET ORAL at 14:13

## 2019-04-10 RX ADMIN — SODIUM CHLORIDE 1000 ML: 900 INJECTION, SOLUTION INTRAVENOUS at 23:57

## 2019-04-10 RX ADMIN — FLUOXETINE 10 MG: 10 CAPSULE ORAL at 21:42

## 2019-04-10 RX ADMIN — Medication 10 ML: at 05:13

## 2019-04-10 RX ADMIN — HYDROMORPHONE HYDROCHLORIDE 1 MG: 2 INJECTION INTRAMUSCULAR; INTRAVENOUS; SUBCUTANEOUS at 18:57

## 2019-04-10 RX ADMIN — PANTOPRAZOLE SODIUM 40 MG: 40 INJECTION, POWDER, FOR SOLUTION INTRAVENOUS at 08:18

## 2019-04-10 NOTE — PROGRESS NOTES
Daily Progress Note: 4/10/2019 Gwendolyn Raviotta Jenene Epley, MD 
 
Assessment/Plan:  
Acute gallstone pancreatitis (4/9/2019): NPO. IV fluids. IV opioids for pain control. IV anti emetics. Consulted GI and general surgery 
  
Calculus of gallbladder with biliary obstruction (4/9/2019) / Elevated LFTs/ Hyperbilirubinemia POA: likely has a CBD stone. GI consulted.  
Gastroesophageal reflux disease without esophagitis (4/9/2019): start IV pepcid 
  
Depression (4/9/2019) / Attention deficit disorder (ADD) in adult (4/9/2019): resume home medications once verified 
  
Elevated BP without diagnosis of hypertension (4/9/2019): monitor. IV Hydralazine as needed for now  
  
Code Status:  Full Problem List: 
Problem List as of 4/10/2019 Never Reviewed Codes Class Noted - Resolved * (Principal) Acute gallstone pancreatitis ICD-10-CM: K85.10 ICD-9-CM: 219.0, 574.20  4/9/2019 - Present Calculus of gallbladder with biliary obstruction but without cholecystitis ICD-10-CM: K80.21 ICD-9-CM: 574.21  4/9/2019 - Present Gastroesophageal reflux disease without esophagitis ICD-10-CM: K21.9 ICD-9-CM: 530.81  4/9/2019 - Present Depression ICD-10-CM: F32.9 ICD-9-CM: 143  4/9/2019 - Present Attention deficit disorder (ADD) in adult ICD-10-CM: F98.8 ICD-9-CM: 314.00  4/9/2019 - Present Elevated BP without diagnosis of hypertension ICD-10-CM: R03.0 ICD-9-CM: 796.2  4/9/2019 - Present Subjective:  
 41 y.o. female who is being admitted for acute gallstone pancreatitis. Ms. Marika Horvath presented to our Emergency Department today complaining of a sudden onset of severe upper abdominal aching pain, radiating to her back and associated with nausea but no vomiting. She denies any chills or fever but has felt generalized itchiness. She says she only drinks alcohol socially.  In the ED, she was found to have acute pancreatitis with an elevated lipase and an abdominal ultrasound showed cholecystolithiasis with tenderness on direct insonation of the gallbladder but no other sonographic signs of cholecystitis. : Bilateral duct dilated to 10 mm consider further evaluation with MRCP/ERCP. She will be admitted for further management. (Dr Doyle Ly) 4/10:  Feeling much better today with no nausea/vomiting at this time. Still has some RUQ/epigastric ab pain but again much better than yesterday. LFTs improving. GI has seen. Awaiting Surg opinion.   
  
Review of Systems: A comprehensive review of systems was negative except for that written in the HPI. Objective:  
Physical Exam:  
Visit Vitals /80 (BP 1 Location: Left arm, BP Patient Position: At rest;Lying right side) Pulse 90 Temp 98.4 °F (36.9 °C) Resp 17 Ht 5' 8\" (1.727 m) Wt 77.1 kg (170 lb) LMP 2019 (Approximate) SpO2 94% BMI 25.85 kg/m² O2 Device: Room air Temp (24hrs), Av.2 °F (36.8 °C), Min:97.9 °F (36.6 °C), Max:98.4 °F (36.9 °C) No intake/output data recorded.  1901 - 04/10 0700 In: 999 [I.V.:999] Out: - General:  Alert, cooperative, no distress, appears stated age. Head:  Normocephalic, without obvious abnormality, atraumatic. Eyes:  Conjunctivae/corneas clear. PERRL, EOMs intact. Nose: Nares normal. Septum midline. Mucosa normal. No drainage or sinus tenderness. Throat: Lips, mucosa, and tongue moist.. Neck: Supple, symmetrical, trachea midline, no adenopathy, thyroid: no enlargement/tenderness/nodules, no carotid bruit and no JVD. Back:   Symmetric, no curvature. ROM normal. No CVA tenderness. Lungs:   Clear to auscultation bilaterally. Chest wall:  No tenderness or deformity. Heart:  Regular rate and rhythm, S1, S2 normal, no murmur, click, rub or gallop. Abdomen:   Soft, tender RUQ and epigastrium. Bowel sounds normal. No masses,  No organomegaly. Extremities: no cyanosis or edema. No calf tenderness or cords. Pulses: 2+ and symmetric all extremities. Skin: Skin color, texture, turgor normal. No rashes or lesions Neurologic: CNII-XII intact. Alert and oriented X 3. Fine motor of hands and fingers normal.   equal.  No cogwheeling or rigidity. Gait not tested at this time. Sensation grossly normal to touch. Gross motor of extremities normal.    
 
Data Review:  
4/9/19  EXAM:  MRI ABD W MRCP W WO CONT INDICATION:   Pancreatitis - acute COMPARISON: Contemporaneous ultrasound. CONTRAST:  7 mL Gadavist. 
TECHNIQUE: MR of the abdomen was performed with coronal SSFSE T2, axial 3-D dual 
echo in and opposed phase T1, axial 2-D bright blood FIESTA FS, 3-D respiratory 
triggered MRCP, thick and thin slab coronal MRCP SSFSE T2, axial SSFSE T2, axial 
FSE T2 BH FS, axial pre-and multiphase postcontrast LAVA, and postcontrast 
coronal LAVA sequences. Janine Lelo FINDINGS: 
The liver is normal.  The gallbladder is distended with small intraluminal 
stones noted. There is redemonstration of 10 mm dilation of the common bile duct 
and mild central intrahepatic biliary dilation. There is smooth tapering of the 
common bile duct and pancreatic head with a very abrupt angular termination of 
the distal common bile duct at the ampulla with no filling defects or strictures 
seen otherwise. The spleen is normal.  The pancreas is normal with no ductal 
dilation and no evidence of pancreas divisum. The adrenal glands are normal.  
The kidneys are normal. 
  
The stomach appears unremarkable. No large or small bowel abnormalities are 
identified. The surrounding musculoskeletal and soft tissue structures are 
unremarkable apart from degenerative spine change. There is no lymphadenopathy or ascites. IMPRESSION: Cholecystolithiasis with distended gallbladder, 10 mm common bile 
duct dilation, and mild central intrahepatic biliary ductal dilation.  No 
 definite common bile duct stones though a tiny impacted stone at the ampulla is 
somewhat suspected given the appearance of the terminus of the common bile duct 
at the ampulla. 4/9/19 EXAM: Right upper quadrant limited abdominal ultrasound. CLINICAL INDICATION:  Evaluate for cholelithiasis. TECHNIQUE: High-resolution selected color flow evaluation of the right upper 
quadrant performed. COMPARISON:  None. FINDINGS: 
The gallbladder contains stones within the lumen at the gallbladder neck but 
otherwise appears normal with no wall thickening, pericholecystic fluid, or 
other abnormality. The patient reported pain with direct insonation of the 
gallbladder. The common bile duct is dilated and measures 10 mm. The liver is unremarkable with no focal lesion or intrahepatic biliary ductal 
dilation. There is hepatopetal portal venous flow within the liver. The pancreas appears normal with no identified mass or ductal dilation. The right kidney appears normal with no identified calculus, mass, or 
hydronephrosis. Right renal length measures 10.1 cm. IMPRESSION: Cholecystolithiasis with tenderness on direct insonation of the 
gallbladder but no other sonographic signs of cholecystitis. : Bilateral duct 
dilated to 10 mm consider further evaluation with MRCP/ERCP. Recent Days: 
Recent Labs 04/10/19 
0502 04/09/19 
1306 WBC 5.4 5.9 HGB 9.9* 12.6 HCT 31.1* 39.0  226 Recent Labs 04/10/19 
0502 04/09/19 
1306  139  
K 4.1 4.1 * 107 CO2 24 28 GLU 82 86 BUN 9 12 CREA 0.59 0.77 CA 8.0* 9.0 MG 1.8  --   
PHOS 3.4  --   
ALB 2.8* 3.7 TBILI 3.1* 4.5* SGOT 190* 441* * 316* No results for input(s): PH, PCO2, PO2, HCO3, FIO2 in the last 72 hours. 24 Hour Results: 
Recent Results (from the past 24 hour(s)) CBC WITH AUTOMATED DIFF Collection Time: 04/09/19  1:06 PM  
Result Value Ref Range WBC 5.9 3.6 - 11.0 K/uL RBC 3.98 3.80 - 5.20 M/uL  
 HGB 12.6 11.5 - 16.0 g/dL HCT 39.0 35.0 - 47.0 % MCV 98.0 80.0 - 99.0 FL  
 MCH 31.7 26.0 - 34.0 PG  
 MCHC 32.3 30.0 - 36.5 g/dL  
 RDW 14.1 11.5 - 14.5 % PLATELET 097 151 - 028 K/uL MPV 9.7 8.9 - 12.9 FL  
 NRBC 0.0 0  WBC ABSOLUTE NRBC 0.00 0.00 - 0.01 K/uL NEUTROPHILS 74 32 - 75 % LYMPHOCYTES 16 12 - 49 % MONOCYTES 7 5 - 13 % EOSINOPHILS 3 0 - 7 % BASOPHILS 0 0 - 1 % IMMATURE GRANULOCYTES 0 0.0 - 0.5 % ABS. NEUTROPHILS 4.4 1.8 - 8.0 K/UL  
 ABS. LYMPHOCYTES 0.9 0.8 - 3.5 K/UL  
 ABS. MONOCYTES 0.4 0.0 - 1.0 K/UL  
 ABS. EOSINOPHILS 0.2 0.0 - 0.4 K/UL  
 ABS. BASOPHILS 0.0 0.0 - 0.1 K/UL  
 ABS. IMM. GRANS. 0.0 0.00 - 0.04 K/UL  
 DF AUTOMATED METABOLIC PANEL, COMPREHENSIVE Collection Time: 04/09/19  1:06 PM  
Result Value Ref Range Sodium 139 136 - 145 mmol/L Potassium 4.1 3.5 - 5.1 mmol/L Chloride 107 97 - 108 mmol/L  
 CO2 28 21 - 32 mmol/L Anion gap 4 (L) 5 - 15 mmol/L Glucose 86 65 - 100 mg/dL BUN 12 6 - 20 MG/DL Creatinine 0.77 0.55 - 1.02 MG/DL  
 BUN/Creatinine ratio 16 12 - 20 GFR est AA >60 >60 ml/min/1.73m2 GFR est non-AA >60 >60 ml/min/1.73m2 Calcium 9.0 8.5 - 10.1 MG/DL Bilirubin, total 4.5 (H) 0.2 - 1.0 MG/DL  
 ALT (SGPT) 316 (H) 12 - 78 U/L  
 AST (SGOT) 441 (H) 15 - 37 U/L Alk. phosphatase 144 (H) 45 - 117 U/L Protein, total 7.1 6.4 - 8.2 g/dL Albumin 3.7 3.5 - 5.0 g/dL Globulin 3.4 2.0 - 4.0 g/dL A-G Ratio 1.1 1.1 - 2.2 LIPASE Collection Time: 04/09/19  1:06 PM  
Result Value Ref Range Lipase >3,000 (H) 73 - 393 U/L  
SAMPLES BEING HELD Collection Time: 04/09/19  1:06 PM  
Result Value Ref Range SAMPLES BEING HELD 1RED,1SST   
 COMMENT Add-on orders for these samples will be processed based on acceptable specimen integrity and analyte stability, which may vary by analyte. URINALYSIS W/MICROSCOPIC  Collection Time: 04/09/19  1:12 PM  
 Result Value Ref Range Color KEEGAN Appearance TURBID (A) CLEAR Specific gravity 1.025 1.003 - 1.030    
 pH (UA) 6.0 5.0 - 8.0 Protein 30 (A) NEG mg/dL Glucose NEGATIVE  NEG mg/dL Ketone 15 (A) NEG mg/dL Blood NEGATIVE  NEG Urobilinogen 4.0 (H) 0.2 - 1.0 EU/dL Nitrites POSITIVE (A) NEG Leukocyte Esterase MODERATE (A) NEG    
 WBC 0-4 0 - 4 /hpf  
 RBC 0-5 0 - 5 /hpf Epithelial cells FEW FEW /lpf Bacteria NEGATIVE  NEG /hpf URINE CULTURE HOLD SAMPLE Collection Time: 04/09/19  1:12 PM  
Result Value Ref Range Urine culture hold URINE ON HOLD IN MICROBIOLOGY DEPT FOR 3 DAYS. IF UNPRESERVED URINE IS SUBMITTED, IT CANNOT BE USED FOR ADDITIONAL TESTING AFTER 24 HRS, RECOLLECTION WILL BE REQUIRED. BILIRUBIN, CONFIRM Collection Time: 04/09/19  1:12 PM  
Result Value Ref Range Bilirubin UA, confirm POSITIVE (A) NEG    
HCG URINE, QL. - POC Collection Time: 04/09/19  1:15 PM  
Result Value Ref Range Pregnancy test,urine (POC) NEGATIVE  NEG    
POC LACTIC ACID Collection Time: 04/09/19  4:17 PM  
Result Value Ref Range Lactic Acid (POC) 0.75 0.40 - 2.00 mmol/L METABOLIC PANEL, COMPREHENSIVE Collection Time: 04/10/19  5:02 AM  
Result Value Ref Range Sodium 140 136 - 145 mmol/L Potassium 4.1 3.5 - 5.1 mmol/L Chloride 111 (H) 97 - 108 mmol/L  
 CO2 24 21 - 32 mmol/L Anion gap 5 5 - 15 mmol/L Glucose 82 65 - 100 mg/dL BUN 9 6 - 20 MG/DL Creatinine 0.59 0.55 - 1.02 MG/DL  
 BUN/Creatinine ratio 15 12 - 20 GFR est AA >60 >60 ml/min/1.73m2 GFR est non-AA >60 >60 ml/min/1.73m2 Calcium 8.0 (L) 8.5 - 10.1 MG/DL Bilirubin, total 3.1 (H) 0.2 - 1.0 MG/DL  
 ALT (SGPT) 196 (H) 12 - 78 U/L  
 AST (SGOT) 190 (H) 15 - 37 U/L Alk. phosphatase 109 45 - 117 U/L Protein, total 5.5 (L) 6.4 - 8.2 g/dL Albumin 2.8 (L) 3.5 - 5.0 g/dL Globulin 2.7 2.0 - 4.0 g/dL A-G Ratio 1.0 (L) 1.1 - 2.2 LIPASE Collection Time: 04/10/19  5:02 AM  
Result Value Ref Range Lipase >3,000 (H) 73 - 393 U/L MAGNESIUM Collection Time: 04/10/19  5:02 AM  
Result Value Ref Range Magnesium 1.8 1.6 - 2.4 mg/dL PHOSPHORUS Collection Time: 04/10/19  5:02 AM  
Result Value Ref Range Phosphorus 3.4 2.6 - 4.7 MG/DL  
CBC W/O DIFF Collection Time: 04/10/19  5:02 AM  
Result Value Ref Range WBC 5.4 3.6 - 11.0 K/uL  
 RBC 3.15 (L) 3.80 - 5.20 M/uL HGB 9.9 (L) 11.5 - 16.0 g/dL HCT 31.1 (L) 35.0 - 47.0 % MCV 98.7 80.0 - 99.0 FL  
 MCH 31.4 26.0 - 34.0 PG  
 MCHC 31.8 30.0 - 36.5 g/dL  
 RDW 14.4 11.5 - 14.5 % PLATELET 798 265 - 459 K/uL MPV 9.7 8.9 - 12.9 FL  
 NRBC 0.0 0  WBC ABSOLUTE NRBC 0.00 0.00 - 0.01 K/uL Medications reviewed Current Facility-Administered Medications Medication Dose Route Frequency  pantoprazole (PROTONIX) 40 mg in sodium chloride 0.9% 10 mL injection  40 mg IntraVENous DAILY  0.9% sodium chloride infusion 1,000 mL  1,000 mL IntraVENous CONTINUOUS  
 sodium chloride (NS) flush 5-40 mL  5-40 mL IntraVENous Q8H  
 sodium chloride (NS) flush 5-40 mL  5-40 mL IntraVENous PRN  
 naloxone (NARCAN) injection 0.4 mg  0.4 mg IntraVENous PRN  
 LORazepam (ATIVAN) injection 1 mg  1 mg IntraVENous Q6H PRN  
 HYDROmorphone (PF) (DILAUDID) injection 1 mg  1 mg IntraVENous Q4H PRN  
 acetaminophen (TYLENOL) tablet 650 mg  650 mg Oral Q4H PRN  
 ondansetron (ZOFRAN) injection 4 mg  4 mg IntraVENous Q4H PRN  
 lactated Ringers infusion 250 mL  250 mL IntraVENous CONTINUOUS  
 FLUoxetine (PROzac) capsule 10 mg  10 mg Oral QHS  sertraline (ZOLOFT) tablet 100 mg  100 mg Oral QHS Care Plan discussed with: Patient and Nurse Total time spent with patient: 30 minutes.  
Gamaliel Morrell MD

## 2019-04-10 NOTE — PROGRESS NOTES
Bedside and Verbal shift change report given to Argelia Champagne (oncoming nurse) by Francisco Leong (offgoing nurse). Report included the following information SBAR, Kardex and ED Summary.

## 2019-04-10 NOTE — PROGRESS NOTES
Nutrition Assessment: 
 
RECOMMENDATIONS/INTERVENTION(S):  
Advance diet to Low fat / GI lite if Gallbladder removed. Monitor weight, GI status, LFTs Add Ensure HP w Breakfast once diet advances Please get standing scale weight. Current weight is \"from previous encounter\" ASSESSMENT:  
4/10: 41 yr old female admitted for abdominal pain, gallstone pancreatitis. PMHx: Gastric sleeve in June 2018, GERD, ADD. Pt states she has lost 100 lbs since June 2018. No weight hx in system to corroborate. Significant for time frame. Pt might have gallbladder removed tomorrow if LFTs / lipase trends down or maybe ERCP if labs remain elevated. Will continue to follow. No n/v. Last PO was Monday morning (4/8). Last BM 4/7 per pt. Labs: , . Lipase >3000. SUBJECTIVE/OBJECTIVE:  
Diet Order: NPO 
% Eaten:  No data found. Pertinent Medications: [x] Reviewed Labs reviewed:  [x] Anthropometrics: Height: 5' 8\" (172.7 cm) Weight: 77.1 kg (170 lb) IBW (%IBW):   ( ) UBW (%UBW):   (  %) BMI: Body mass index is 25.85 kg/m². This BMI is indicative of: 
 
 [] Underweight    [] Normal    [x] Overweight    []  Obesity    []  Extreme Obesity (BMI>40) Estimated Nutrition Needs (Based on): 1930 Kcals/day(BMR(5352jh0.3)) , 62 g(-77g/day(0.8-1.0g/kg)) Protein Carbohydrate: At Least 130 g/day  Fluids: 1950 mL/day (1mL/kg rounded to 50 mL) Last BM: 4/7  [x]Active     []Hyperactive  []Hypoactive       [] Absent   BS Skin:    [x] Intact   [] Incision  [] Breakdown   [] DTI   [] Tears/Excoriation/Abrasion  []Edema [] Other: Wt Readings from Last 30 Encounters:  
04/09/19 77.1 kg (170 lb) 04/09/19 77.1 kg (170 lb) NUTRITION DIAGNOSES:  
Problem:  Altered GI function Etiology: related to alteration in GI structure/function Signs/Symptoms: as evidenced by recent hx of gastric sleeve, gallstone pancreatitis NUTRITION INTERVENTIONS: 
 Meals/Snacks: General/healthful diet   Supplements: Commercial supplement GOAL:  
Pt will advance diet within 2-4 days and tolerate PO w/o pain Cultural, Jain, or Ethnic Dietary Needs: None LEARNING NEEDS (Diet, Food/Nutrient-Drug Interaction):  
 [x] None Identified 
 [] Identified and Education Provided/Documented 
 [] Identified and Pt declined/was not appropriate [x] Interdisciplinary Care Plan Reviewed/Documented  
 [x] Discharge Needs: General healthful diet, small portion sizes [] No Nutrition Related Discharge Needs NUTRITION RISK:  
Pt Is At Nutrition Risk  [x] No Nutrition Risk Identified  [] PT SEEN FOR:  
 []  MD Consult: []Calorie Count []Diabetic Diet Education []Diet Education []Electrolyte Management []General Nutrition Management and Supplements []Management of Tube Feeding []TPN Recommendations [x]  RN Referral:  [x]MST score >=2 
   []Enteral/Parenteral Nutrition PTA []Pregnant: Gestational DM or Multigestation  
              [] Pressure Ulcer 
   
[]  Low BMI      []  Length of Stay       [] Dysphagia Diet     [] Ventilator      [] Follow-Up Previous Recommendations: 
 [] Implemented          [] Not Implemented          [x] Not Applicable Previous Goal: 
 [] Met              [] Progressing Towards Goal              [] Not Progressing Towards Goal   [x] Not Applicable Blanquita Silva RD Pager: [phone] Office: [phone]

## 2019-04-10 NOTE — ANESTHESIA PREPROCEDURE EVALUATION
Relevant Problems No relevant active problems Anesthetic History No history of anesthetic complications Review of Systems / Medical History Patient summary reviewed, nursing notes reviewed and pertinent labs reviewed Pulmonary Within defined limits Neuro/Psych Psychiatric history Cardiovascular Exercise tolerance: >4 METS 
  
GI/Hepatic/Renal 
  
GERD: well controlled Endo/Other Within defined limits Other Findings Comments: Gastric sleeve - June 2018 Physical Exam 
 
Airway Mallampati: I 
TM Distance: > 6 cm Neck ROM: normal range of motion Mouth opening: Normal 
 
 Cardiovascular Regular rate and rhythm,  S1 and S2 normal,  no murmur, click, rub, or gallop Dental 
 
Dentition: Upper dentition intact and Lower dentition intact Pulmonary Breath sounds clear to auscultation Abdominal 
GI exam deferred Other Findings Anesthetic Plan ASA: 2 Anesthesia type: general 
 
 
 
 
Induction: Intravenous Anesthetic plan and risks discussed with: Patient

## 2019-04-10 NOTE — CONSULTS
Surgery Consult    Subjective:      Ricardo Carter is a 41 y.o. white female who I was asked to see for gallstone pancreatitis. Mrs. Frod Downing has a h/o pancreatitis from last fall when she was admitted to [facility].  She was never told the exact cause or that any intervention was required. Two days ago, she developed an acute onset of sharp upper abdominal pain radiating to her back with n/v, diarrhea, and jaundice. She denies any fever. Her urine was tea-colored, and her stool was light. She denies any h/o PUD, liver disease, or GI disease. Her only abdominal procedure is a lap gastric sleeve. She was seen in the ER yesterday and had an US which revealed gallstones. Her liver enzymes were elevated at that time. Her MRCP revealed ductal dilatation with a suspected stone at the ampulla. Presently, she still has pain.     Past Medical History:   Diagnosis Date    Gastrointestinal disorder     Hypertension     Psychiatric disorder      Past Surgical History:   Procedure Laterality Date    HX GI      gastric sleeve      Family History   Problem Relation Age of Onset    Diabetes Neg Hx     Hypertension Neg Hx      Social History     Socioeconomic History    Marital status: SINGLE     Spouse name: Not on file    Number of children: Not on file    Years of education: Not on file    Highest education level: Not on file   Tobacco Use    Smoking status: Never Smoker    Smokeless tobacco: Never Used   Substance and Sexual Activity    Alcohol use: Yes      Current Facility-Administered Medications   Medication Dose Route Frequency Provider Last Rate Last Dose    pantoprazole (PROTONIX) 40 mg in sodium chloride 0.9% 10 mL injection  40 mg IntraVENous DAILY Naresh Teran MD   40 mg at 04/10/19 0818    0.9% sodium chloride infusion 1,000 mL  1,000 mL IntraVENous CONTINUOUS Cuco Lindquist  mL/hr at 04/09/19 2158 1,000 mL at 04/09/19 2158    sodium chloride (NS) flush 5-40 mL  5-40 mL IntraVENous Q8H Bhumi Hines MD   10 mL at 04/10/19 0513    sodium chloride (NS) flush 5-40 mL  5-40 mL IntraVENous PRN Bhumi Hines MD        naloxone [facility]) injection 0.4 mg  0.4 mg IntraVENous PRN Bhumi Hines MD        LORazepam (ATIVAN) injection 1 mg  1 mg IntraVENous Q6H PRN Bhumi Hines MD        HYDROmorphone (PF) (DILAUDID) injection 1 mg  1 mg IntraVENous Q4H PRN Bhumi Hines MD   1 mg at 04/10/19 0512    acetaminophen (TYLENOL) tablet 650 mg  650 mg Oral Q4H PRN Bhumi Hines MD   650 mg at 04/10/19 0513    ondansetron (ZOFRAN) injection 4 mg  4 mg IntraVENous Q4H PRN Bhumi Hines MD   4 mg at 04/10/19 0513    lactated Ringers infusion 250 mL  250 mL IntraVENous CONTINUOUS Bhumi Hines MD   250 mL at 19 1759    FLUoxetine (PROzac) capsule 10 mg  10 mg Oral QHS Bhumi Hines MD   10 mg at 19 2157    sertraline (ZOLOFT) tablet 100 mg  100 mg Oral QHS Bhumi Hines MD   100 mg at 19 215        No Known Allergies    Review of Systems:  A comprehensive review of systems was negative except for that written in the History of Present Illness. Objective:        Patient Vitals for the past 8 hrs:   BP Temp Pulse Resp SpO2   04/10/19 0316 128/80 98.4 °F (36.9 °C) 90 17 94 %       Temp (24hrs), Av.2 °F (36.8 °C), Min:97.9 °F (36.6 °C), Max:98.4 °F (36.9 °C)      Physical Exam:  GENERAL: alert, cooperative, no distress, appears stated age, EYE: positive findings: sclera icteric, LYMPHATIC: Cervical, supraclavicular,  nodes normal. , THROAT & NECK: normal, LUNG: clear to auscultation bilaterally, HEART: regular rate and rhythm ABDOMEN: Soft, ND, moderate RUQ and epigastric tenderness with voluntary guarding. There are no masses. , EXTREMITIES:  no edema, SKIN: Normal., NEUROLOGIC: negative, PSYCHIATRIC: non focal    Assessment:     Hospital Problems  Never Reviewed          Codes Class Noted POA    * (Principal) Acute gallstone pancreatitis ICD-10-CM: K85.10  ICD-9-CM: 667.6, 574.20  4/9/2019 Yes        Calculus of gallbladder with biliary obstruction but without cholecystitis ICD-10-CM: K80.21  ICD-9-CM: 574.21  4/9/2019 Yes        Gastroesophageal reflux disease without esophagitis ICD-10-CM: K21.9  ICD-9-CM: 530.81  4/9/2019 Yes        Depression ICD-10-CM: F32.9  ICD-9-CM: 888  4/9/2019 Yes        Attention deficit disorder (ADD) in adult ICD-10-CM: F98.8  ICD-9-CM: 314.00  4/9/2019 Yes        Elevated BP without diagnosis of hypertension ICD-10-CM: R03.0  ICD-9-CM: 796.2  4/9/2019 Yes              Plan:     Mrs. Marlon Staples probably still has a stone lodged at the ampulla given her pain and persistent elevated lipase. If her lipase and LFT's go down and she feels better, then I will take her to the OR tomorrow for a lap farhat/IOC. If her lipase and /or LFT's remain elevated, then she may benefit from an ERCP. I discussed the risks of the procedure including bleeding, infection, wound healing problems, persistent pain, blood clots, injury to the bowel, bile duct, or liver, and reaction to the prep, contrast, or local and general anesthetic. She understands the risks; any and all questions were answered to her satisfaction. The plan of care was discussed with Mrs. Hughes and GI. I personally reviewed the US and MRI films. I appreciate the medical service allowing me to participate in Mrs. Hughes's care.     Signed By: Brian Hunt MD     April 10, 2019

## 2019-04-10 NOTE — PROGRESS NOTES
Reason for Admission:   Acute gallstone pancreatitis RRAT Score:  4 Plan for utilizing home health:      Not indicated; no prior HH hx 
                 
Current Advanced Directive/Advance Care Plan: No 
 
Likelihood of Readmission:  Low Transition of Care Plan:  Home CM met with pt who lives in a three story home with her partner at charted address in [place]. MBR is located on the second floor. PTA pt was working full time. She has Rx coverage and uses [address] [place] [address] on [id number] [email or web address]. No discharge needs are anticipated at this time. Dewayne Reynoso LCSW Care Management Interventions PCP Verified by CM: Yes(Dr. Morneo; no nurse navigator) Palliative Care Criteria Met (RRAT>21 & CHF Dx)?: No 
Discharge Durable Medical Equipment: No 
Physical Therapy Consult: No 
Occupational Therapy Consult: No 
Speech Therapy Consult: No 
Current Support Network: Other(Lives with partner) Confirm Follow Up Transport: Family Plan discussed with Pt/Family/Caregiver: Yes Discharge Location Discharge Placement: Home

## 2019-04-10 NOTE — PROGRESS NOTES
Bedside and Verbal shift change report given to Lord Kika RN (oncoming nurse) by Mahsa Selby RN (offgoing nurse). Report included the following information SBAR, Kardex, Intake/Output, MAR, Recent Results and Med Rec Status.

## 2019-04-10 NOTE — PROGRESS NOTES
Gastrointestinal Progress Note 4/10/2019 Admit Date: 4/9/2019 Subjective:  
 
Pain a little better but has not resolved. Pain: Patient complains of abdominal pain yes. The pain is located in the epigastrium. The pain is described as sharp, and is worse with movement and deep breaths. Pain controlled with pain medication. Denies nausea, vomiting, or diarrhea. Feels some of this may be GERD      Discussed MRI results options lap farhat with IOC vs ERCP Current Facility-Administered Medications Medication Dose Route Frequency  pantoprazole (PROTONIX) 40 mg in sodium chloride 0.9% 10 mL injection  40 mg IntraVENous DAILY  0.9% sodium chloride infusion 1,000 mL  1,000 mL IntraVENous CONTINUOUS  
 sodium chloride (NS) flush 5-40 mL  5-40 mL IntraVENous Q8H  
 sodium chloride (NS) flush 5-40 mL  5-40 mL IntraVENous PRN  
 naloxone (NARCAN) injection 0.4 mg  0.4 mg IntraVENous PRN  
 LORazepam (ATIVAN) injection 1 mg  1 mg IntraVENous Q6H PRN  
 HYDROmorphone (PF) (DILAUDID) injection 1 mg  1 mg IntraVENous Q4H PRN  
 acetaminophen (TYLENOL) tablet 650 mg  650 mg Oral Q4H PRN  
 ondansetron (ZOFRAN) injection 4 mg  4 mg IntraVENous Q4H PRN  
 lactated Ringers infusion 250 mL  250 mL IntraVENous CONTINUOUS  
 FLUoxetine (PROzac) capsule 10 mg  10 mg Oral QHS  sertraline (ZOLOFT) tablet 100 mg  100 mg Oral QHS Objective:  
 
Blood pressure 128/80, pulse 90, temperature 98.4 °F (36.9 °C), resp. rate 17, height 5' 8\" (1.727 m), weight 77.1 kg (170 lb), last menstrual period 03/19/2019, SpO2 94 %. No intake/output data recorded. 04/08 1901 - 04/10 0700 In: 999 [I.V.:999] Out: - EXAM:  GENERAL: alert, cooperative, no distress, HEART: regular rate and rhythm, S1, S2 normal, no murmur, click, rub or gallop, LUNGS: chest clear, no wheezing, rales, normal symmetric air entry, ABDOMEN:  Bowel sounds present, soft, nondistended, tender across upper abdomen EXTREMITY: extremities normal, atraumatic, no cyanosis or edema Data Review Recent Results (from the past 24 hour(s)) CBC WITH AUTOMATED DIFF Collection Time: 04/09/19  1:06 PM  
Result Value Ref Range WBC 5.9 3.6 - 11.0 K/uL  
 RBC 3.98 3.80 - 5.20 M/uL  
 HGB 12.6 11.5 - 16.0 g/dL HCT 39.0 35.0 - 47.0 % MCV 98.0 80.0 - 99.0 FL  
 MCH 31.7 26.0 - 34.0 PG  
 MCHC 32.3 30.0 - 36.5 g/dL  
 RDW 14.1 11.5 - 14.5 % PLATELET 235 614 - 034 K/uL MPV 9.7 8.9 - 12.9 FL  
 NRBC 0.0 0  WBC ABSOLUTE NRBC 0.00 0.00 - 0.01 K/uL NEUTROPHILS 74 32 - 75 % LYMPHOCYTES 16 12 - 49 % MONOCYTES 7 5 - 13 % EOSINOPHILS 3 0 - 7 % BASOPHILS 0 0 - 1 % IMMATURE GRANULOCYTES 0 0.0 - 0.5 % ABS. NEUTROPHILS 4.4 1.8 - 8.0 K/UL  
 ABS. LYMPHOCYTES 0.9 0.8 - 3.5 K/UL  
 ABS. MONOCYTES 0.4 0.0 - 1.0 K/UL  
 ABS. EOSINOPHILS 0.2 0.0 - 0.4 K/UL  
 ABS. BASOPHILS 0.0 0.0 - 0.1 K/UL  
 ABS. IMM. GRANS. 0.0 0.00 - 0.04 K/UL  
 DF AUTOMATED METABOLIC PANEL, COMPREHENSIVE Collection Time: 04/09/19  1:06 PM  
Result Value Ref Range Sodium 139 136 - 145 mmol/L Potassium 4.1 3.5 - 5.1 mmol/L Chloride 107 97 - 108 mmol/L  
 CO2 28 21 - 32 mmol/L Anion gap 4 (L) 5 - 15 mmol/L Glucose 86 65 - 100 mg/dL BUN 12 6 - 20 MG/DL Creatinine 0.77 0.55 - 1.02 MG/DL  
 BUN/Creatinine ratio 16 12 - 20 GFR est AA >60 >60 ml/min/1.73m2 GFR est non-AA >60 >60 ml/min/1.73m2 Calcium 9.0 8.5 - 10.1 MG/DL Bilirubin, total 4.5 (H) 0.2 - 1.0 MG/DL  
 ALT (SGPT) 316 (H) 12 - 78 U/L  
 AST (SGOT) 441 (H) 15 - 37 U/L Alk. phosphatase 144 (H) 45 - 117 U/L Protein, total 7.1 6.4 - 8.2 g/dL Albumin 3.7 3.5 - 5.0 g/dL Globulin 3.4 2.0 - 4.0 g/dL A-G Ratio 1.1 1.1 - 2.2 LIPASE Collection Time: 04/09/19  1:06 PM  
Result Value Ref Range Lipase >3,000 (H) 73 - 393 U/L  
SAMPLES BEING HELD Collection Time: 04/09/19  1:06 PM  
Result Value Ref Range SAMPLES BEING HELD 1RED,1SST   
 COMMENT Add-on orders for these samples will be processed based on acceptable specimen integrity and analyte stability, which may vary by analyte. URINALYSIS W/MICROSCOPIC Collection Time: 04/09/19  1:12 PM  
Result Value Ref Range Color KEEGAN Appearance TURBID (A) CLEAR Specific gravity 1.025 1.003 - 1.030    
 pH (UA) 6.0 5.0 - 8.0 Protein 30 (A) NEG mg/dL Glucose NEGATIVE  NEG mg/dL Ketone 15 (A) NEG mg/dL Blood NEGATIVE  NEG Urobilinogen 4.0 (H) 0.2 - 1.0 EU/dL Nitrites POSITIVE (A) NEG Leukocyte Esterase MODERATE (A) NEG    
 WBC 0-4 0 - 4 /hpf  
 RBC 0-5 0 - 5 /hpf Epithelial cells FEW FEW /lpf Bacteria NEGATIVE  NEG /hpf URINE CULTURE HOLD SAMPLE Collection Time: 04/09/19  1:12 PM  
Result Value Ref Range Urine culture hold URINE ON HOLD IN MICROBIOLOGY DEPT FOR 3 DAYS. IF UNPRESERVED URINE IS SUBMITTED, IT CANNOT BE USED FOR ADDITIONAL TESTING AFTER 24 HRS, RECOLLECTION WILL BE REQUIRED. BILIRUBIN, CONFIRM Collection Time: 04/09/19  1:12 PM  
Result Value Ref Range Bilirubin UA, confirm POSITIVE (A) NEG    
HCG URINE, QL. - POC Collection Time: 04/09/19  1:15 PM  
Result Value Ref Range Pregnancy test,urine (POC) NEGATIVE  NEG    
POC LACTIC ACID Collection Time: 04/09/19  4:17 PM  
Result Value Ref Range Lactic Acid (POC) 0.75 0.40 - 2.00 mmol/L METABOLIC PANEL, COMPREHENSIVE Collection Time: 04/10/19  5:02 AM  
Result Value Ref Range Sodium 140 136 - 145 mmol/L Potassium 4.1 3.5 - 5.1 mmol/L Chloride 111 (H) 97 - 108 mmol/L  
 CO2 24 21 - 32 mmol/L Anion gap 5 5 - 15 mmol/L Glucose 82 65 - 100 mg/dL BUN 9 6 - 20 MG/DL Creatinine 0.59 0.55 - 1.02 MG/DL  
 BUN/Creatinine ratio 15 12 - 20 GFR est AA >60 >60 ml/min/1.73m2 GFR est non-AA >60 >60 ml/min/1.73m2 Calcium 8.0 (L) 8.5 - 10.1 MG/DL  Bilirubin, total 3.1 (H) 0.2 - 1.0 MG/DL  
 ALT (SGPT) 196 (H) 12 - 78 U/L  
 AST (SGOT) 190 (H) 15 - 37 U/L Alk. phosphatase 109 45 - 117 U/L Protein, total 5.5 (L) 6.4 - 8.2 g/dL Albumin 2.8 (L) 3.5 - 5.0 g/dL Globulin 2.7 2.0 - 4.0 g/dL A-G Ratio 1.0 (L) 1.1 - 2.2 LIPASE Collection Time: 04/10/19  5:02 AM  
Result Value Ref Range Lipase >3,000 (H) 73 - 393 U/L MAGNESIUM Collection Time: 04/10/19  5:02 AM  
Result Value Ref Range Magnesium 1.8 1.6 - 2.4 mg/dL PHOSPHORUS Collection Time: 04/10/19  5:02 AM  
Result Value Ref Range Phosphorus 3.4 2.6 - 4.7 MG/DL  
CBC W/O DIFF Collection Time: 04/10/19  5:02 AM  
Result Value Ref Range WBC 5.4 3.6 - 11.0 K/uL  
 RBC 3.15 (L) 3.80 - 5.20 M/uL HGB 9.9 (L) 11.5 - 16.0 g/dL HCT 31.1 (L) 35.0 - 47.0 % MCV 98.7 80.0 - 99.0 FL  
 MCH 31.4 26.0 - 34.0 PG  
 MCHC 31.8 30.0 - 36.5 g/dL  
 RDW 14.4 11.5 - 14.5 % PLATELET 975 486 - 486 K/uL MPV 9.7 8.9 - 12.9 FL  
 NRBC 0.0 0  WBC ABSOLUTE NRBC 0.00 0.00 - 0.01 K/uL MRI Results (most recent): 
Results from Hospital Encounter encounter on 04/09/19 MRI ABD W MRCP W WO CONT Narrative EXAM:  MRI ABD W MRCP W WO CONT INDICATION:   Pancreatitis - acute COMPARISON: Contemporaneous ultrasound. CONTRAST:  7 mL Gadavist. 
 
TECHNIQUE: MR of the abdomen was performed with coronal SSFSE T2, axial 3-D dual 
echo in and opposed phase T1, axial 2-D bright blood FIESTA FS, 3-D respiratory 
triggered MRCP, thick and thin slab coronal MRCP SSFSE T2, axial SSFSE T2, axial 
FSE T2 BH FS, axial pre-and multiphase postcontrast LAVA, and postcontrast 
coronal LAVA sequences. Kassandra Conn FINDINGS: 
The liver is normal.  The gallbladder is distended with small intraluminal 
stones noted. There is redemonstration of 10 mm dilation of the common bile duct 
and mild central intrahepatic biliary dilation.  There is smooth tapering of the 
common bile duct and pancreatic head with a very abrupt angular termination of 
 the distal common bile duct at the ampulla with no filling defects or strictures 
seen otherwise. The spleen is normal.  The pancreas is normal with no ductal 
dilation and no evidence of pancreas divisum. The adrenal glands are normal.  
The kidneys are normal. 
 
The stomach appears unremarkable. No large or small bowel abnormalities are 
identified. The surrounding musculoskeletal and soft tissue structures are 
unremarkable apart from degenerative spine change. There is no lymphadenopathy or ascites. Impression IMPRESSION: Cholecystolithiasis with distended gallbladder, 10 mm common bile 
duct dilation, and mild central intrahepatic biliary ductal dilation. No 
definite common bile duct stones though a tiny impacted stone at the ampulla is 
somewhat suspected given the appearance of the terminus of the common bile duct 
at the ampulla. Assessment:  
 
Principal Problem: 
  Acute gallstone pancreatitis (4/9/2019) Active Problems: 
  Calculus of gallbladder with biliary obstruction but without cholecystitis (4/9/2019) Gastroesophageal reflux disease without esophagitis (4/9/2019) Depression (4/9/2019) Attention deficit disorder (ADD) in adult (4/9/2019) Elevated BP without diagnosis of hypertension (4/9/2019) Discussed MRCP results with patient - findings indicate possible of stone at ampulla but not definite. Tbili and liver enzymes and improving, lipase remains >3000. Plan: discussed with Dr. Tatiana Friend 1. Continue supportive care: NPO, IV fluids, prn analgesics and antiemetics 2. Surgery consult - recommendations for lap farhat with IOC when patient recovers from acute pancreatitis 3. Repeat labs in AM - if worsening LFTs or rising Tbili then consider need for ERCP 
4.  D/C famotidine and start pantoprazole Elpidio Matthews PA-C 
04/10/19 
8:32 AM 
 
I have interviewed and examined patient with addendum to note above and formulation care plan to reflect my evaluation Raymon Rockwell M.D.

## 2019-04-11 ENCOUNTER — APPOINTMENT (OUTPATIENT)
Dept: GENERAL RADIOLOGY | Age: 44
DRG: 418 | End: 2019-04-11
Attending: SURGERY
Payer: COMMERCIAL

## 2019-04-11 ENCOUNTER — ANESTHESIA (OUTPATIENT)
Dept: SURGERY | Age: 44
DRG: 418 | End: 2019-04-11
Payer: COMMERCIAL

## 2019-04-11 LAB
ALBUMIN SERPL-MCNC: 3 G/DL (ref 3.5–5)
ALBUMIN/GLOB SERPL: 1.1 {RATIO} (ref 1.1–2.2)
ALP SERPL-CCNC: 109 U/L (ref 45–117)
ALT SERPL-CCNC: 146 U/L (ref 12–78)
ANION GAP SERPL CALC-SCNC: 7 MMOL/L (ref 5–15)
AST SERPL-CCNC: 92 U/L (ref 15–37)
BASOPHILS # BLD: 0 K/UL (ref 0–0.1)
BASOPHILS NFR BLD: 0 % (ref 0–1)
BILIRUB SERPL-MCNC: 1.8 MG/DL (ref 0.2–1)
BUN SERPL-MCNC: 6 MG/DL (ref 6–20)
BUN/CREAT SERPL: 14 (ref 12–20)
CALCIUM SERPL-MCNC: 8.2 MG/DL (ref 8.5–10.1)
CHLORIDE SERPL-SCNC: 109 MMOL/L (ref 97–108)
CO2 SERPL-SCNC: 20 MMOL/L (ref 21–32)
CREAT SERPL-MCNC: 0.42 MG/DL (ref 0.55–1.02)
DIFFERENTIAL METHOD BLD: ABNORMAL
EOSINOPHIL # BLD: 0.1 K/UL (ref 0–0.4)
EOSINOPHIL NFR BLD: 1 % (ref 0–7)
ERYTHROCYTE [DISTWIDTH] IN BLOOD BY AUTOMATED COUNT: 13.7 % (ref 11.5–14.5)
GLOBULIN SER CALC-MCNC: 2.8 G/DL (ref 2–4)
GLUCOSE SERPL-MCNC: 69 MG/DL (ref 65–100)
HCT VFR BLD AUTO: 31.9 % (ref 35–47)
HGB BLD-MCNC: 10.2 G/DL (ref 11.5–16)
IMM GRANULOCYTES # BLD AUTO: 0.1 K/UL (ref 0–0.04)
IMM GRANULOCYTES NFR BLD AUTO: 1 % (ref 0–0.5)
LIPASE SERPL-CCNC: 393 U/L (ref 73–393)
LYMPHOCYTES # BLD: 0.8 K/UL (ref 0.8–3.5)
LYMPHOCYTES NFR BLD: 12 % (ref 12–49)
MCH RBC QN AUTO: 31.5 PG (ref 26–34)
MCHC RBC AUTO-ENTMCNC: 32 G/DL (ref 30–36.5)
MCV RBC AUTO: 98.5 FL (ref 80–99)
MONOCYTES # BLD: 0.3 K/UL (ref 0–1)
MONOCYTES NFR BLD: 5 % (ref 5–13)
NEUTS SEG # BLD: 5 K/UL (ref 1.8–8)
NEUTS SEG NFR BLD: 81 % (ref 32–75)
NRBC # BLD: 0 K/UL (ref 0–0.01)
NRBC BLD-RTO: 0 PER 100 WBC
PLATELET # BLD AUTO: 168 K/UL (ref 150–400)
PMV BLD AUTO: 10.3 FL (ref 8.9–12.9)
POTASSIUM SERPL-SCNC: 4.2 MMOL/L (ref 3.5–5.1)
PROT SERPL-MCNC: 5.8 G/DL (ref 6.4–8.2)
RBC # BLD AUTO: 3.24 M/UL (ref 3.8–5.2)
RBC MORPH BLD: ABNORMAL
SODIUM SERPL-SCNC: 136 MMOL/L (ref 136–145)
WBC # BLD AUTO: 6.3 K/UL (ref 3.6–11)

## 2019-04-11 PROCEDURE — 77030018673: Performed by: SURGERY

## 2019-04-11 PROCEDURE — 74011000250 HC RX REV CODE- 250: Performed by: SURGERY

## 2019-04-11 PROCEDURE — 36415 COLL VENOUS BLD VENIPUNCTURE: CPT

## 2019-04-11 PROCEDURE — 77030002933 HC SUT MCRYL J&J -A: Performed by: SURGERY

## 2019-04-11 PROCEDURE — 76010000875 HC OR TIME 1.5 TO 2HR INTENSV - TIER 2: Performed by: SURGERY

## 2019-04-11 PROCEDURE — 77030011640 HC PAD GRND REM COVD -A: Performed by: SURGERY

## 2019-04-11 PROCEDURE — 74011000254 HC RX REV CODE- 254: Performed by: SURGERY

## 2019-04-11 PROCEDURE — 77030010939 HC CLP LIG TELE -B: Performed by: SURGERY

## 2019-04-11 PROCEDURE — 77030020782 HC GWN BAIR PAWS FLX 3M -B

## 2019-04-11 PROCEDURE — 77030035045 HC TRCR ENDOSC VRSPRT BLDLSS COVD -B: Performed by: SURGERY

## 2019-04-11 PROCEDURE — 76210000016 HC OR PH I REC 1 TO 1.5 HR: Performed by: SURGERY

## 2019-04-11 PROCEDURE — 77030031139 HC SUT VCRL2 J&J -A: Performed by: SURGERY

## 2019-04-11 PROCEDURE — 77030016151 HC PROTCTR LNS DFOG COVD -B: Performed by: SURGERY

## 2019-04-11 PROCEDURE — 80053 COMPREHEN METABOLIC PANEL: CPT

## 2019-04-11 PROCEDURE — 77030008756 HC TU IRR SUC STRY -B: Performed by: SURGERY

## 2019-04-11 PROCEDURE — 77030032490 HC SLV COMPR SCD KNE COVD -B

## 2019-04-11 PROCEDURE — 74011250636 HC RX REV CODE- 250/636

## 2019-04-11 PROCEDURE — 85025 COMPLETE CBC W/AUTO DIFF WBC: CPT

## 2019-04-11 PROCEDURE — 74011636320 HC RX REV CODE- 636/320: Performed by: SURGERY

## 2019-04-11 PROCEDURE — 77030035277 HC OBTRTR BLDELSS DISP INTU -B: Performed by: SURGERY

## 2019-04-11 PROCEDURE — 0FT44ZZ RESECTION OF GALLBLADDER, PERCUTANEOUS ENDOSCOPIC APPROACH: ICD-10-PCS | Performed by: SURGERY

## 2019-04-11 PROCEDURE — 76060000034 HC ANESTHESIA 1.5 TO 2 HR: Performed by: SURGERY

## 2019-04-11 PROCEDURE — 0F994ZZ DRAINAGE OF COMMON BILE DUCT, PERCUTANEOUS ENDOSCOPIC APPROACH: ICD-10-PCS | Performed by: SURGERY

## 2019-04-11 PROCEDURE — 74011250636 HC RX REV CODE- 250/636: Performed by: SURGERY

## 2019-04-11 PROCEDURE — 74300 X-RAY BILE DUCTS/PANCREAS: CPT

## 2019-04-11 PROCEDURE — 65270000029 HC RM PRIVATE

## 2019-04-11 PROCEDURE — C9113 INJ PANTOPRAZOLE SODIUM, VIA: HCPCS | Performed by: INTERNAL MEDICINE

## 2019-04-11 PROCEDURE — 74011000250 HC RX REV CODE- 250

## 2019-04-11 PROCEDURE — 77030020263 HC SOL INJ SOD CL0.9% LFCR 1000ML: Performed by: SURGERY

## 2019-04-11 PROCEDURE — BF101ZZ FLUOROSCOPY OF BILE DUCTS USING LOW OSMOLAR CONTRAST: ICD-10-PCS | Performed by: SURGERY

## 2019-04-11 PROCEDURE — 94762 N-INVAS EAR/PLS OXIMTRY CONT: CPT

## 2019-04-11 PROCEDURE — 77030020703 HC SEAL CANN DISP INTU -B: Performed by: SURGERY

## 2019-04-11 PROCEDURE — 74011250636 HC RX REV CODE- 250/636: Performed by: FAMILY MEDICINE

## 2019-04-11 PROCEDURE — 74011250637 HC RX REV CODE- 250/637: Performed by: INTERNAL MEDICINE

## 2019-04-11 PROCEDURE — 83690 ASSAY OF LIPASE: CPT

## 2019-04-11 PROCEDURE — 74011250636 HC RX REV CODE- 250/636: Performed by: INTERNAL MEDICINE

## 2019-04-11 PROCEDURE — 77030035048 HC TRCR ENDOSC OPTCL COVD -B: Performed by: SURGERY

## 2019-04-11 PROCEDURE — 77030009852 HC PCH RTVR ENDOSC COVD -B: Performed by: SURGERY

## 2019-04-11 PROCEDURE — 77030018836 HC SOL IRR NACL ICUM -A: Performed by: SURGERY

## 2019-04-11 PROCEDURE — 77030033188 HC TBNG FLTRD BIIFUR DISP CNMD -C: Performed by: SURGERY

## 2019-04-11 PROCEDURE — 77030008684 HC TU ET CUF COVD -B: Performed by: ANESTHESIOLOGY

## 2019-04-11 PROCEDURE — 8E0W4CZ ROBOTIC ASSISTED PROCEDURE OF TRUNK REGION, PERCUTANEOUS ENDOSCOPIC APPROACH: ICD-10-PCS | Performed by: SURGERY

## 2019-04-11 PROCEDURE — 88304 TISSUE EXAM BY PATHOLOGIST: CPT

## 2019-04-11 PROCEDURE — 0FC83ZZ EXTIRPATION OF MATTER FROM CYSTIC DUCT, PERCUTANEOUS APPROACH: ICD-10-PCS | Performed by: SURGERY

## 2019-04-11 RX ORDER — ENOXAPARIN SODIUM 100 MG/ML
40 INJECTION SUBCUTANEOUS EVERY 24 HOURS
Status: DISCONTINUED | OUTPATIENT
Start: 2019-04-11 | End: 2019-04-11

## 2019-04-11 RX ORDER — SODIUM CHLORIDE, SODIUM LACTATE, POTASSIUM CHLORIDE, CALCIUM CHLORIDE 600; 310; 30; 20 MG/100ML; MG/100ML; MG/100ML; MG/100ML
100 INJECTION, SOLUTION INTRAVENOUS CONTINUOUS
Status: DISCONTINUED | OUTPATIENT
Start: 2019-04-11 | End: 2019-04-11 | Stop reason: HOSPADM

## 2019-04-11 RX ORDER — ONDANSETRON 2 MG/ML
4 INJECTION INTRAMUSCULAR; INTRAVENOUS AS NEEDED
Status: DISCONTINUED | OUTPATIENT
Start: 2019-04-11 | End: 2019-04-11 | Stop reason: HOSPADM

## 2019-04-11 RX ORDER — INDOCYANINE GREEN AND WATER 25 MG
5 KIT INJECTION ONCE
Status: COMPLETED | OUTPATIENT
Start: 2019-04-11 | End: 2019-04-11

## 2019-04-11 RX ORDER — SODIUM CHLORIDE, SODIUM LACTATE, POTASSIUM CHLORIDE, CALCIUM CHLORIDE 600; 310; 30; 20 MG/100ML; MG/100ML; MG/100ML; MG/100ML
75 INJECTION, SOLUTION INTRAVENOUS CONTINUOUS
Status: DISCONTINUED | OUTPATIENT
Start: 2019-04-11 | End: 2019-04-11 | Stop reason: HOSPADM

## 2019-04-11 RX ORDER — PROPOFOL 10 MG/ML
INJECTION, EMULSION INTRAVENOUS AS NEEDED
Status: DISCONTINUED | OUTPATIENT
Start: 2019-04-11 | End: 2019-04-11 | Stop reason: HOSPADM

## 2019-04-11 RX ORDER — ROCURONIUM BROMIDE 10 MG/ML
INJECTION, SOLUTION INTRAVENOUS AS NEEDED
Status: DISCONTINUED | OUTPATIENT
Start: 2019-04-11 | End: 2019-04-11 | Stop reason: HOSPADM

## 2019-04-11 RX ORDER — CEFAZOLIN SODIUM/WATER 2 G/20 ML
2 SYRINGE (ML) INTRAVENOUS ONCE
Status: DISCONTINUED | OUTPATIENT
Start: 2019-04-11 | End: 2019-04-11 | Stop reason: HOSPADM

## 2019-04-11 RX ORDER — KETOROLAC TROMETHAMINE 30 MG/ML
INJECTION, SOLUTION INTRAMUSCULAR; INTRAVENOUS AS NEEDED
Status: DISCONTINUED | OUTPATIENT
Start: 2019-04-11 | End: 2019-04-11 | Stop reason: HOSPADM

## 2019-04-11 RX ORDER — LIDOCAINE HCL/PF 100 MG/5ML
SYRINGE (ML) INTRAVENOUS AS NEEDED
Status: DISCONTINUED | OUTPATIENT
Start: 2019-04-11 | End: 2019-04-11 | Stop reason: HOSPADM

## 2019-04-11 RX ORDER — HYDROMORPHONE HYDROCHLORIDE 1 MG/ML
.25-1 INJECTION, SOLUTION INTRAMUSCULAR; INTRAVENOUS; SUBCUTANEOUS
Status: DISCONTINUED | OUTPATIENT
Start: 2019-04-11 | End: 2019-04-11 | Stop reason: HOSPADM

## 2019-04-11 RX ORDER — SODIUM CHLORIDE 0.9 % (FLUSH) 0.9 %
5-40 SYRINGE (ML) INJECTION EVERY 8 HOURS
Status: DISCONTINUED | OUTPATIENT
Start: 2019-04-11 | End: 2019-04-11 | Stop reason: HOSPADM

## 2019-04-11 RX ORDER — MIDAZOLAM HYDROCHLORIDE 1 MG/ML
INJECTION, SOLUTION INTRAMUSCULAR; INTRAVENOUS AS NEEDED
Status: DISCONTINUED | OUTPATIENT
Start: 2019-04-11 | End: 2019-04-11 | Stop reason: HOSPADM

## 2019-04-11 RX ORDER — DEXAMETHASONE SODIUM PHOSPHATE 4 MG/ML
INJECTION, SOLUTION INTRA-ARTICULAR; INTRALESIONAL; INTRAMUSCULAR; INTRAVENOUS; SOFT TISSUE AS NEEDED
Status: DISCONTINUED | OUTPATIENT
Start: 2019-04-11 | End: 2019-04-11 | Stop reason: HOSPADM

## 2019-04-11 RX ORDER — HYDROCODONE BITARTRATE AND ACETAMINOPHEN 5; 325 MG/1; MG/1
1 TABLET ORAL
Status: DISCONTINUED | OUTPATIENT
Start: 2019-04-11 | End: 2019-04-12 | Stop reason: HOSPADM

## 2019-04-11 RX ORDER — SODIUM CHLORIDE 0.9 % (FLUSH) 0.9 %
5-40 SYRINGE (ML) INJECTION AS NEEDED
Status: DISCONTINUED | OUTPATIENT
Start: 2019-04-11 | End: 2019-04-11 | Stop reason: HOSPADM

## 2019-04-11 RX ORDER — LIDOCAINE HYDROCHLORIDE 10 MG/ML
0.1 INJECTION, SOLUTION EPIDURAL; INFILTRATION; INTRACAUDAL; PERINEURAL AS NEEDED
Status: DISCONTINUED | OUTPATIENT
Start: 2019-04-11 | End: 2019-04-11 | Stop reason: HOSPADM

## 2019-04-11 RX ORDER — SODIUM CHLORIDE, SODIUM LACTATE, POTASSIUM CHLORIDE, CALCIUM CHLORIDE 600; 310; 30; 20 MG/100ML; MG/100ML; MG/100ML; MG/100ML
125 INJECTION, SOLUTION INTRAVENOUS CONTINUOUS
Status: DISCONTINUED | OUTPATIENT
Start: 2019-04-11 | End: 2019-04-11 | Stop reason: HOSPADM

## 2019-04-11 RX ORDER — ONDANSETRON 2 MG/ML
INJECTION INTRAMUSCULAR; INTRAVENOUS AS NEEDED
Status: DISCONTINUED | OUTPATIENT
Start: 2019-04-11 | End: 2019-04-11 | Stop reason: HOSPADM

## 2019-04-11 RX ORDER — DIPHENHYDRAMINE HYDROCHLORIDE 50 MG/ML
12.5 INJECTION, SOLUTION INTRAMUSCULAR; INTRAVENOUS AS NEEDED
Status: DISCONTINUED | OUTPATIENT
Start: 2019-04-11 | End: 2019-04-11 | Stop reason: HOSPADM

## 2019-04-11 RX ORDER — KETOROLAC TROMETHAMINE 30 MG/ML
30 INJECTION, SOLUTION INTRAMUSCULAR; INTRAVENOUS ONCE
Status: COMPLETED | OUTPATIENT
Start: 2019-04-11 | End: 2019-04-11

## 2019-04-11 RX ORDER — BUPIVACAINE HYDROCHLORIDE 5 MG/ML
INJECTION, SOLUTION EPIDURAL; INTRACAUDAL AS NEEDED
Status: DISCONTINUED | OUTPATIENT
Start: 2019-04-11 | End: 2019-04-11 | Stop reason: HOSPADM

## 2019-04-11 RX ORDER — KETOROLAC TROMETHAMINE 30 MG/ML
30 INJECTION, SOLUTION INTRAMUSCULAR; INTRAVENOUS EVERY 6 HOURS
Status: DISCONTINUED | OUTPATIENT
Start: 2019-04-11 | End: 2019-04-12 | Stop reason: HOSPADM

## 2019-04-11 RX ORDER — CEFAZOLIN SODIUM 1 G/3ML
INJECTION, POWDER, FOR SOLUTION INTRAMUSCULAR; INTRAVENOUS AS NEEDED
Status: DISCONTINUED | OUTPATIENT
Start: 2019-04-11 | End: 2019-04-11 | Stop reason: HOSPADM

## 2019-04-11 RX ORDER — ENOXAPARIN SODIUM 100 MG/ML
40 INJECTION SUBCUTANEOUS EVERY 24 HOURS
Status: DISCONTINUED | OUTPATIENT
Start: 2019-04-11 | End: 2019-04-12 | Stop reason: HOSPADM

## 2019-04-11 RX ORDER — FENTANYL CITRATE 50 UG/ML
INJECTION, SOLUTION INTRAMUSCULAR; INTRAVENOUS AS NEEDED
Status: DISCONTINUED | OUTPATIENT
Start: 2019-04-11 | End: 2019-04-11 | Stop reason: HOSPADM

## 2019-04-11 RX ADMIN — Medication 80 MG: at 09:30

## 2019-04-11 RX ADMIN — ROCURONIUM BROMIDE 30 MG: 10 INJECTION, SOLUTION INTRAVENOUS at 09:30

## 2019-04-11 RX ADMIN — ONDANSETRON 4 MG: 2 INJECTION INTRAMUSCULAR; INTRAVENOUS at 12:48

## 2019-04-11 RX ADMIN — HYDROMORPHONE HYDROCHLORIDE 1 MG: 2 INJECTION INTRAMUSCULAR; INTRAVENOUS; SUBCUTANEOUS at 12:42

## 2019-04-11 RX ADMIN — INDOCYANINE GREEN AND WATER 2.5 MG: KIT at 09:30

## 2019-04-11 RX ADMIN — PROPOFOL 150 MG: 10 INJECTION, EMULSION INTRAVENOUS at 09:30

## 2019-04-11 RX ADMIN — SODIUM CHLORIDE 1000 ML: 900 INJECTION, SOLUTION INTRAVENOUS at 20:36

## 2019-04-11 RX ADMIN — ONDANSETRON 4 MG: 2 INJECTION INTRAMUSCULAR; INTRAVENOUS at 03:48

## 2019-04-11 RX ADMIN — PANTOPRAZOLE SODIUM 40 MG: 40 INJECTION, POWDER, FOR SOLUTION INTRAVENOUS at 08:02

## 2019-04-11 RX ADMIN — FLUOXETINE 10 MG: 10 CAPSULE ORAL at 23:29

## 2019-04-11 RX ADMIN — KETOROLAC TROMETHAMINE 30 MG: 30 INJECTION, SOLUTION INTRAMUSCULAR at 16:30

## 2019-04-11 RX ADMIN — Medication 10 ML: at 23:31

## 2019-04-11 RX ADMIN — SODIUM CHLORIDE 1000 ML: 900 INJECTION, SOLUTION INTRAVENOUS at 11:53

## 2019-04-11 RX ADMIN — FENTANYL CITRATE 100 MCG: 50 INJECTION, SOLUTION INTRAMUSCULAR; INTRAVENOUS at 09:30

## 2019-04-11 RX ADMIN — ONDANSETRON 4 MG: 2 INJECTION INTRAMUSCULAR; INTRAVENOUS at 10:37

## 2019-04-11 RX ADMIN — ENOXAPARIN SODIUM 40 MG: 40 INJECTION SUBCUTANEOUS at 16:30

## 2019-04-11 RX ADMIN — ONDANSETRON 4 MG: 2 INJECTION INTRAMUSCULAR; INTRAVENOUS at 20:36

## 2019-04-11 RX ADMIN — SERTRALINE HYDROCHLORIDE 100 MG: 50 TABLET ORAL at 23:29

## 2019-04-11 RX ADMIN — DEXAMETHASONE SODIUM PHOSPHATE 4 MG: 4 INJECTION, SOLUTION INTRA-ARTICULAR; INTRALESIONAL; INTRAMUSCULAR; INTRAVENOUS; SOFT TISSUE at 09:30

## 2019-04-11 RX ADMIN — CEFAZOLIN SODIUM 2 G: 1 INJECTION, POWDER, FOR SOLUTION INTRAMUSCULAR; INTRAVENOUS at 09:30

## 2019-04-11 RX ADMIN — ACETAMINOPHEN 650 MG: 325 TABLET ORAL at 20:37

## 2019-04-11 RX ADMIN — KETOROLAC TROMETHAMINE 30 MG: 30 INJECTION, SOLUTION INTRAMUSCULAR; INTRAVENOUS at 10:48

## 2019-04-11 RX ADMIN — KETOROLAC TROMETHAMINE 30 MG: 30 INJECTION, SOLUTION INTRAMUSCULAR at 03:48

## 2019-04-11 RX ADMIN — KETOROLAC TROMETHAMINE 30 MG: 30 INJECTION, SOLUTION INTRAMUSCULAR at 23:29

## 2019-04-11 RX ADMIN — MIDAZOLAM HYDROCHLORIDE 2 MG: 1 INJECTION, SOLUTION INTRAMUSCULAR; INTRAVENOUS at 09:20

## 2019-04-11 NOTE — ANESTHESIA POSTPROCEDURE EVALUATION
Procedure(s): 
ROBOTIC ASSISTED LAPAROSCOPIC CHOLECYSTECTOMY WITH FIRE FLY, CHOLANGIOGRAMS. general 
 
Anesthesia Post Evaluation Multimodal analgesia: multimodal analgesia not used between 6 hours prior to anesthesia start to PACU discharge Patient location during evaluation: PACU Patient participation: complete - patient participated Level of consciousness: awake and alert Pain score: 0 Pain management: adequate Airway patency: patent Anesthetic complications: no 
Cardiovascular status: hemodynamically stable and acceptable Respiratory status: acceptable Hydration status: acceptable Comments: Patient seen and evaluated; no concerns. Post anesthesia nausea and vomiting:  none Vitals Value Taken Time /77 4/11/2019 12:15 PM  
Temp 36.3 °C (97.4 °F) 4/11/2019 11:10 AM  
Pulse 67 4/11/2019 12:19 PM  
Resp 14 4/11/2019 12:19 PM  
SpO2 96 % 4/11/2019 12:19 PM  
Vitals shown include unvalidated device data.

## 2019-04-11 NOTE — ROUTINE PROCESS
TRANSFER - OUT REPORT: 
 
Verbal report given to SARITA Sanchez(name) on Vonda Lam  being transferred to [facility](unit) for routine progression of care Report consisted of patients Situation, Background, Assessment and  
Recommendations(SBAR). Information from the following report(s) SBAR and Kardex was reviewed with the receiving nurse. Lines:  
Peripheral IV 04/09/19 Right Antecubital (Active) Site Assessment Clean, dry, & intact 4/11/2019 12:30 PM  
Phlebitis Assessment 0 4/11/2019 12:30 PM  
Infiltration Assessment 0 4/11/2019 12:30 PM  
Dressing Status Intact 4/11/2019 12:30 PM  
Dressing Type Transparent 4/11/2019 12:30 PM  
Hub Color/Line Status Pink; Infusing 4/11/2019 12:30 PM  
Action Taken Open ports on tubing capped 4/11/2019  8:03 AM  
Alcohol Cap Used Yes 4/11/2019  8:03 AM  
  
 
Opportunity for questions and clarification was provided. Patient transported with: 
 Registered Nurse

## 2019-04-11 NOTE — PROGRESS NOTES
0730- Bedside and Verbal shift change report given to [facility] (oncoming nurse) by Walter Dumont (offgoing nurse). Report included the following information SBAR, Kardex, Intake/Output, MAR and Recent Results. Pt observed in bed resting quietly, on room air, states headache pain 3/10 at this time. Pt updated with approximate time of surgery 1115. Pt completed second CHG bath.  
 
0751- TRANSFER - OUT REPORT: 
 
Verbal report given to Pre OP RN (name) on Vaishali Hughes  being transferred to Pre OP(unit) for ordered procedure Report consisted of patients Situation, Background, Assessment and  
Recommendations(SBAR). Information from the following report(s) SBAR, Kardex, Intake/Output, MAR and Recent Results was reviewed with the receiving nurse. Lines:  
Peripheral IV 04/09/19 Right Antecubital (Active) Site Assessment Clean, dry, & intact 4/11/2019  3:51 AM  
Phlebitis Assessment 0 4/11/2019  3:51 AM  
Infiltration Assessment 0 4/11/2019  3:51 AM  
Dressing Status Clean, dry, & intact 4/11/2019  3:51 AM  
Dressing Type Transparent 4/10/2019  9:40 PM  
Hub Color/Line Status Infusing 4/11/2019  3:51 AM  
Action Taken Open ports on tubing capped 4/10/2019  3:16 PM  
Alcohol Cap Used Yes 4/10/2019  3:16 PM  
  
 
Opportunity for questions and clarification was provided. Patient transported with: 
 Tech 
 
4087- Transport on unit to bring patient for procedure. Anc sent with patient down to pre op. 1238- TRANSFER - IN REPORT: 
 
Verbal report received from TwylaRN(name) on Vaishali Hughes  being received from PACU(unit) for routine progression of care Report consisted of patients Situation, Background, Assessment and  
Recommendations(SBAR). Information from the following report(s) SBAR, Kardex, Intake/Output, MAR and Recent Results was reviewed with the receiving nurse. Opportunity for questions and clarification was provided. Assessment completed upon patients arrival to unit and care assumed. Patient received on unit, states pain and nausea, requesting meds, VS obtained. Will assess. 1- Dr. Katerine Mcgraw on unit and updated on patient tolerating diet well and statement of \"I'm so hungry! \" Dr. Katerine Mcgraw stated patient OK to advance to regular diet. 1930- Bedside and Verbal shift change report given to SARITA Mcguire (oncoming nurse) by Ramon Do (offgoing nurse). Report included the following information SBAR, Kardex, Intake/Output, MAR and Recent Results.

## 2019-04-11 NOTE — OP NOTES
Operative Report    Charanjit Rowe    MRN:  [id number]. Date of Surgery:   4/11/2019     Surgeon:  Yasir Red MD.      Assistant:    Amadou Baum. Anesthesia:   1. General endotracheal.  2.  0.5% Marcaine. Preoperative Diagnosis:   GALLSTONES PANCREATITIS. Postoperative Diagnosis:   GALLSTONES PANCREATITIS. Procedure:   Robotic-assisted laparoscopic cholecystectomy with firefly and cholangiogram.    Indication:   Charanjit Rowe is a 41 yrs with a history of epigastric pain and pancreatitis. Her US reveals gallstones, and her MRCP is suspicious for a stone at the ampulla. Her pancreatitis has resolved. She presents at this time for her elective cholecystectomy. Procedure in Detail:  The patient was seen preoperatively in the holding area. The risks, benefits, and expected outcomes were discussed with the patient, and all questions were answered satisfactorily. The patient concurred with the proposed plan, giving informed consent. The patient was injected with IC green prior to the procedure start time. The patient was taken to the Operating Room. The patient was identified as Charanjit Rowe, and the procedure verified as Robotic Assisted Laparoscopic Cholecystectomy with Firefly, possible Intraoperative Cholangiogram, possible open. The patient was placed on the OR table in the supine position. Prior to the induction of anesthesia, antibiotic prophylaxis was administered. General endotracheal anesthesia was administered and tolerated well. The patient's abdomen was prepped with Chloraprep and draped in the usual sterile fashion. A Time Out was performed, and the above information was confirmed. Using a 15 blade, a transverse incision was made above the umbilicus after injecting the local anesthetic. The abdominal wall was elevated with towel clips. Using the optiview technique, an 8 mm trocar was introduced into the abdominal cavity.   Insufflation was provided through this trocar to establish a pneumoperitoneum of 15 mmHg which the patient tolerated. The camera was inserted through the trocar. The RUQ was visualized, and there were no adhesions noted to prevent a laparoscopic approach. The local anesthetic was injected at all intended trocar sites. Two 8 mm trocars were placed approximately 10 cm lateral to the camera port on either side. A 5 mm trocar was placed in the RLQ along the anterior axillary line for the assistant. The patient was positioned in reverse Trendelenburg and rotated slightly toward the left. The robotic arms were docked. At this time, I scrubbed out and went to the surgeon console. I took control of the robotic arms for the major portion of the procedure. Attention was turned to the right upper quadrant. The liver appeared grossly normal.  The fundus of the gallbladder was grasped and retracted over the dome of the liver. There were no adhesions to the gallbladder. The gallbladder appeared edematous. The infundibulum was grasped and retracted laterally. Using blunt dissection and cautery, the cystic duct was carefully dissected out and clearly visualized entering the gallbladder. The biliary anatomy was confirmed with firefly. The cystic artery was identified posterior to this within Calot's triangle. It was dissected out and clearly visualized entering the gallbladder as well. Once the critical view was obtained, a Hemolock clip was placed at the base of the infundibulum. A choledochotomy was made in the very distal cystic duct with the endoscissors. There was immediate return of bile from the cystic duct. At this point, a 14-gauge angiocatheter was introduced into the RUQ abdominal wall. An Arrow cholangiogram catheter was advanced through it, flushed with saline, and inserted into the choledochotomy. Once within the cystic duct, the catheter was clipped, and the cystic duct flushed with normal saline.  There was no evidence of any leakage from the choledochotomy, and the saline flushed easily without much resistance. All instruments were removed from the patient's abdomen. The patient was placed flat, and the C-arm was brought into the field. A  film was shot to obtain proper orientation and alignment of the C-arm. Under real-time fluoroscopy, full-strength Isovue contrast was injected slowly by hand. There was immediate filling of the duodenum. There were no filling defects seen in the common bile duct, but there did appear to be external compression of the distal bile duct. The duct was minimally dilated. Contrast easily refluxed up into the common hepatic duct and right and left hepatic ducts extending into the liver with no filling defects present. The C-arm was removed. The cholangiogram catheter was removed. A stone was removed from the cystic duct. The cystic duct was clipped twice proximally with Hemolock clips and divided with the endoscissors. The cystic artery was clipped and divided in similar fashion. Traction was then placed on the gallbladder as it was carefully dissected from the liver bed in retrograde fashion with cautery. Hemostasis was obtained along the liver bed as needed. Prior to removing the gallbladder, the RUQ was inspected. The clips along the cystic duct were in place with no evidence of any bile leakage, and the clips along the cystic artery were in place with no evidence of any bleeding. The right upper quadrant and gallbladder were gently irrigated with saline until effluent was clear. The supraumbilical trocar was up-sized to a 12 mm trocar. The gallbladder was retrieved intact via an Endocatch bag and pulled up to the fascia at the supraumbilical incision. At this time, I scrubbed back in and went to the patient's bedside. The fascia was dilated, allowing the gallbladder to be removed. No stones were palpated within the gallbladder.   The gallbladder was passed off as a specimen. The fascia at the supraumbilical incision was closed with a 0-0 Vicryl using the Endoclose device. The LUQ and RLQ trocars were removed under direct laparoscopic visualization, and there was no bleeding noted from either site. The laparoscope was removed, and the abdomen was decompressed. The RUQ trocars was then removed. Hemostasis was obtained within the wounds as needed with cautery. The wounds were irrigated with saline. The skin at all sites was approximated with 4-0 Monocryl in the usual subcuticular fashion. The wounds were cleaned and dried, and steri-strips and Bandaids were applied. The patient was extubated in the room. Estimated Blood Loss:  Less than 25 ml. Lucho Charlotte Specimen:   ID Type Source Tests Collected by Time Destination   1 : gallbladder Preservative Gallbladder  Fay Denver., MD 4/11/2019 1019 Pathology               Implants:  None. Findings:   1. A grossly normal-appearing liver. 2.  An edematous gallbladder. 3.  No gallstones palpated in the gallbladder, but a single small stone in the cystic duct. 4.  The cholangiogram revealed immediate filling of the duodenum without filling defects, but there was external compression of the distal CBD. Counts: All sponge, needle, and instrument counts were correct x 2. Complications:    None. Disposition:   The patient was transferred to the recovery room in stable room, having tolerated the procedure and anesthesia well.         Signed By: Harmeet Barney MD     April 11, 2019            CC:Bravo Lantigua MD

## 2019-04-11 NOTE — PROGRESS NOTES
GI Note Currently off floor and in OR. Review of labs show Tbili, liver enzymes and lipase trending down to normal. 
 
In OR for lap farhat with IOC Will await findings of IOC to determine if ERCP needed.  
 
Courtney Amor PA-C 
04/11/19 
11:07 AM

## 2019-04-11 NOTE — PROGRESS NOTES
Progress Note Patient: Darrick Richardson MRN: [id number]  SSN: [id number] YOB: 1975  Age: 41 y.o. Sex: female Admit Date: 2019 Day of Surgery Procedure:  Procedure(s): 
ROBOTIC ASSISTED LAPAROSCOPIC CHOLECYSTECTOMY WITH FIRE FLY, POSSIBLE CHOLANGIOGRAMS POSSIBLE OPEN Subjective:  
 
Ms. Linda Rollins feels better and denies pain. Objective:  
 
Visit Vitals /76 (BP 1 Location: Right arm, BP Patient Position: At rest) Pulse 66 Temp 97.5 °F (36.4 °C) Resp 18 Ht 5' 8\" (1.727 m) Wt 180 lb 1.9 oz (81.7 kg) SpO2 96% BMI 27.39 kg/m² Temp (24hrs), Av.8 °F (36.6 °C), Min:97.5 °F (36.4 °C), Max:98 °F (36.7 °C) Physical Exam:   
GENERAL: alert, cooperative, no distress, EYE: negative findings: anicteric sclera, ABDOMEN: Soft, NT, ND. Lab Review:  
BMP:  
Lab Results Component Value Date/Time  2019 04:59 AM  
 K 4.2 2019 04:59 AM  
  (H) 2019 04:59 AM  
 CO2 20 (L) 2019 04:59 AM  
 AGAP 7 2019 04:59 AM  
 GLU 69 2019 04:59 AM  
 BUN 6 2019 04:59 AM  
 CREA 0.42 (L) 2019 04:59 AM  
 GFRAA >60 2019 04:59 AM  
 GFRNA >60 2019 04:59 AM  
 
CMP:  
Lab Results Component Value Date/Time  2019 04:59 AM  
 K 4.2 2019 04:59 AM  
  (H) 2019 04:59 AM  
 CO2 20 (L) 2019 04:59 AM  
 AGAP 7 2019 04:59 AM  
 GLU 69 2019 04:59 AM  
 BUN 6 2019 04:59 AM  
 CREA 0.42 (L) 2019 04:59 AM  
 GFRAA >60 2019 04:59 AM  
 GFRNA >60 2019 04:59 AM  
 CA 8.2 (L) 2019 04:59 AM  
 ALB 3.0 (L) 2019 04:59 AM  
 TP 5.8 (L) 2019 04:59 AM  
 GLOB 2.8 2019 04:59 AM  
 AGRAT 1.1 2019 04:59 AM  
 SGOT 92 (H) 2019 04:59 AM  
  (H) 2019 04:59 AM  
 
CBC:  
Lab Results Component Value Date/Time  WBC 6.3 2019 04:59 AM  
 HGB 10.2 (L) 2019 04:59 AM  
 HCT 31.9 (L) 04/11/2019 04:59 AM  
  04/11/2019 04:59 AM  
 
Pancreatic Markers:  
Lab Results Component Value Date/Time LPSE 393 04/11/2019 04:59 AM  
 
 
Assessment:  
 
Hospital Problems  Never Reviewed Codes Class Noted POA * (Principal) Acute gallstone pancreatitis ICD-10-CM: K85.10 ICD-9-CM: 847.4, 574.20  4/9/2019 Yes Calculus of gallbladder with biliary obstruction but without cholecystitis ICD-10-CM: K80.21 ICD-9-CM: 574.21  4/9/2019 Yes Gastroesophageal reflux disease without esophagitis ICD-10-CM: K21.9 ICD-9-CM: 530.81  4/9/2019 Yes Depression ICD-10-CM: F32.9 ICD-9-CM: 999  4/9/2019 Yes Attention deficit disorder (ADD) in adult ICD-10-CM: F98.8 ICD-9-CM: 314.00  4/9/2019 Yes Elevated BP without diagnosis of hypertension ICD-10-CM: R03.0 ICD-9-CM: 796.2  4/9/2019 Yes Plan/Recommendations/Medical Decision Making:  
 
Lipase normal, pain resolved. To OR for lap farhat/IOC. Resume diet postop. Signed By: Astrid Mirza MD   
 April 11, 2019

## 2019-04-11 NOTE — PROGRESS NOTES
Gastrointestinal Progress Note 4/11/2019 Admit Date: 4/9/2019 Subjective:  
 
New Complaints Today:  No: feels back to normal 
 
Eating normally Current Facility-Administered Medications Medication Dose Route Frequency  HYDROcodone-acetaminophen (NORCO) 5-325 mg per tablet 1 Tab  1 Tab Oral Q4H PRN  
 ketorolac (TORADOL) injection 30 mg  30 mg IntraVENous Q6H  
 enoxaparin (LOVENOX) injection 40 mg  40 mg SubCUTAneous Q24H  pantoprazole (PROTONIX) 40 mg in sodium chloride 0.9% 10 mL injection  40 mg IntraVENous DAILY  0.9% sodium chloride infusion 1,000 mL  1,000 mL IntraVENous CONTINUOUS  
 sodium chloride (NS) flush 5-40 mL  5-40 mL IntraVENous Q8H  
 sodium chloride (NS) flush 5-40 mL  5-40 mL IntraVENous PRN  
 naloxone (NARCAN) injection 0.4 mg  0.4 mg IntraVENous PRN  
 LORazepam (ATIVAN) injection 1 mg  1 mg IntraVENous Q6H PRN  
 HYDROmorphone (PF) (DILAUDID) injection 1 mg  1 mg IntraVENous Q4H PRN  
 acetaminophen (TYLENOL) tablet 650 mg  650 mg Oral Q4H PRN  
 ondansetron (ZOFRAN) injection 4 mg  4 mg IntraVENous Q4H PRN  
 lactated Ringers infusion 250 mL  250 mL IntraVENous CONTINUOUS  
 FLUoxetine (PROzac) capsule 10 mg  10 mg Oral QHS  sertraline (ZOLOFT) tablet 100 mg  100 mg Oral QHS Objective:  
 
Blood pressure 132/83, pulse 68, temperature 97.7 °F (36.5 °C), resp. rate 18, height 5' 8\" (1.727 m), weight 81.6 kg (180 lb), last menstrual period 03/19/2019, SpO2 98 %. No intake/output data recorded. 04/10 0701 - 04/11 1900 In: 200 [I.V.:200] Out: - EXAM:  GENERAL: alert, cooperative, no distress, HEART: regular rate and rhythm, LUNGS: chest clear, no wheezing, rales, normal symmetric air entry, ABDOMEN:  Bowel sounds are normal, liver is not enlarged, spleen is not enlarged Data Review Recent Results (from the past 24 hour(s)) METABOLIC PANEL, COMPREHENSIVE Collection Time: 04/11/19  4:59 AM  
Result Value Ref Range Sodium 136 136 - 145 mmol/L Potassium 4.2 3.5 - 5.1 mmol/L Chloride 109 (H) 97 - 108 mmol/L  
 CO2 20 (L) 21 - 32 mmol/L Anion gap 7 5 - 15 mmol/L Glucose 69 65 - 100 mg/dL BUN 6 6 - 20 MG/DL Creatinine 0.42 (L) 0.55 - 1.02 MG/DL  
 BUN/Creatinine ratio 14 12 - 20 GFR est AA >60 >60 ml/min/1.73m2 GFR est non-AA >60 >60 ml/min/1.73m2 Calcium 8.2 (L) 8.5 - 10.1 MG/DL Bilirubin, total 1.8 (H) 0.2 - 1.0 MG/DL  
 ALT (SGPT) 146 (H) 12 - 78 U/L  
 AST (SGOT) 92 (H) 15 - 37 U/L Alk. phosphatase 109 45 - 117 U/L Protein, total 5.8 (L) 6.4 - 8.2 g/dL Albumin 3.0 (L) 3.5 - 5.0 g/dL Globulin 2.8 2.0 - 4.0 g/dL A-G Ratio 1.1 1.1 - 2.2 LIPASE Collection Time: 04/11/19  4:59 AM  
Result Value Ref Range Lipase 393 73 - 393 U/L  
CBC WITH AUTOMATED DIFF Collection Time: 04/11/19  4:59 AM  
Result Value Ref Range WBC 6.3 3.6 - 11.0 K/uL  
 RBC 3.24 (L) 3.80 - 5.20 M/uL  
 HGB 10.2 (L) 11.5 - 16.0 g/dL HCT 31.9 (L) 35.0 - 47.0 % MCV 98.5 80.0 - 99.0 FL  
 MCH 31.5 26.0 - 34.0 PG  
 MCHC 32.0 30.0 - 36.5 g/dL  
 RDW 13.7 11.5 - 14.5 % PLATELET 743 762 - 731 K/uL MPV 10.3 8.9 - 12.9 FL  
 NRBC 0.0 0  WBC ABSOLUTE NRBC 0.00 0.00 - 0.01 K/uL NEUTROPHILS 81 (H) 32 - 75 % LYMPHOCYTES 12 12 - 49 % MONOCYTES 5 5 - 13 % EOSINOPHILS 1 0 - 7 % BASOPHILS 0 0 - 1 % IMMATURE GRANULOCYTES 1 (H) 0.0 - 0.5 % ABS. NEUTROPHILS 5.0 1.8 - 8.0 K/UL  
 ABS. LYMPHOCYTES 0.8 0.8 - 3.5 K/UL  
 ABS. MONOCYTES 0.3 0.0 - 1.0 K/UL  
 ABS. EOSINOPHILS 0.1 0.0 - 0.4 K/UL  
 ABS. BASOPHILS 0.0 0.0 - 0.1 K/UL  
 ABS. IMM. GRANS. 0.1 (H) 0.00 - 0.04 K/UL  
 DF SMEAR SCANNED    
 RBC COMMENTS NORMOCYTIC, NORMOCHROMIC    
 
IOC: no retained stone Assessment:  
 
Principal Problem: 
  Acute gallstone pancreatitis (4/9/2019) Active Problems: 
  Calculus of gallbladder with biliary obstruction but without cholecystitis (4/9/2019) Gastroesophageal reflux disease without esophagitis (4/9/2019) Depression (4/9/2019) Attention deficit disorder (ADD) in adult (4/9/2019) Elevated BP without diagnosis of hypertension (4/9/2019) Plan: 1. Have advised she follow up with PCP Dr Madelyn Reyes 3-6 weeks after discharge for follow up blood labs. If then normal, no additional evaluation recommendations 2. See again as needed; OK discharge 3. thanks

## 2019-04-11 NOTE — PROGRESS NOTES
Bedside shift change report given to Fahad (oncoming nurse) by Keon Reilly (offgoing nurse). Report included the following information SBAR, Kardex, MAR and Recent Results.

## 2019-04-11 NOTE — PROGRESS NOTES
Pt requested medication for migraine (pain level 9). Called Dr. Alexys Askew, on-call, for med. MD ordered toradol 30 mg IV, one-time. Order entered. Will administer.

## 2019-04-11 NOTE — PROGRESS NOTES
Daily Progress Note: 4/11/2019 Vaishali Larry MD 
 
Assessment/Plan:  
Acute gallstone pancreatitis (4/9/2019):  
---NPO. 
---IV fluids. ---IV opioids for pain control. IV anti emetics. ---Consulted GI and general surgery 
---For OR today 
  
Calculus of gallbladder with biliary obstruction (4/9/2019) / Elevated LFTs/ Hyperbilirubinemia POA: likely has a CBD stone. ---GI consulted.  
 
Gastroesophageal reflux disease without esophagitis (4/9/2019): start IV pepcid 
  
Depression (4/9/2019) / Attention deficit disorder (ADD) in adult (4/9/2019):  
---resume home medications once taking po 
  
Elevated BP without diagnosis of hypertension (4/9/2019):  
---monitor. ---IV Hydralazine as needed for now  
  
Code Status:  Full Problem List: 
Problem List as of 4/11/2019 Never Reviewed Codes Class Noted - Resolved * (Principal) Acute gallstone pancreatitis ICD-10-CM: K85.10 ICD-9-CM: 074.0, 574.20  4/9/2019 - Present Calculus of gallbladder with biliary obstruction but without cholecystitis ICD-10-CM: K80.21 ICD-9-CM: 574.21  4/9/2019 - Present Gastroesophageal reflux disease without esophagitis ICD-10-CM: K21.9 ICD-9-CM: 530.81  4/9/2019 - Present Depression ICD-10-CM: F32.9 ICD-9-CM: 566  4/9/2019 - Present Attention deficit disorder (ADD) in adult ICD-10-CM: F98.8 ICD-9-CM: 314.00  4/9/2019 - Present Elevated BP without diagnosis of hypertension ICD-10-CM: R03.0 ICD-9-CM: 796.2  4/9/2019 - Present Subjective:  
 41 y.o. female who is being admitted for acute gallstone pancreatitis. Ms. Makenzie Griffin presented to our Emergency Department today complaining of a sudden onset of severe upper abdominal aching pain, radiating to her back and associated with nausea but no vomiting. She denies any chills or fever but has felt generalized itchiness.  She says she only drinks alcohol socially. In the ED, she was found to have acute pancreatitis with an elevated lipase and an abdominal ultrasound showed cholecystolithiasis with tenderness on direct insonation of the gallbladder but no other sonographic signs of cholecystitis. : Bilateral duct dilated to 10 mm consider further evaluation with MRCP/ERCP. She will be admitted for further management. (Dr Patrice Hinton) 4/10:  Feeling much better today with no nausea/vomiting at this time. Still has some RUQ/epigastric ab pain but again much better than yesterday. LFTs improving. GI has seen. Awaiting Surg opinion.   
  
: Feeling better this am. Labs are improving and she has less pain. Hopefully she is going to the OR today. Appreciate GI and Surgery consults. Review of Systems: A comprehensive review of systems was negative except for that written in the HPI. Objective:  
Physical Exam:  
Visit Vitals /82 (BP 1 Location: Right arm, BP Patient Position: At rest) Pulse 78 Temp 97.5 °F (36.4 °C) Resp 17 Ht 5' 8\" (1.727 m) Wt 180 lb 1.9 oz (81.7 kg) LMP 2019 (Approximate) SpO2 97% BMI 27.39 kg/m² O2 Device: Room air Temp (24hrs), Av.8 °F (36.6 °C), Min:97.5 °F (36.4 °C), Max:98 °F (36.7 °C) No intake/output data recorded. No intake/output data recorded. General:  Alert, cooperative, no distress, appears stated age. Head:  Normocephalic, without obvious abnormality, atraumatic. Eyes:  Conjunctivae/corneas clear. PERRL, EOMs intact. Nose: Nares normal. Septum midline. Mucosa normal. No drainage or sinus tenderness. Throat: Lips, mucosa, and tongue moist.. Neck: Supple, symmetrical, trachea midline, no adenopathy, thyroid: no enlargement/tenderness/nodules, no carotid bruit and no JVD. Back:   Symmetric, no curvature. ROM normal. No CVA tenderness. Lungs:   Clear to auscultation bilaterally. Chest wall:  No tenderness or deformity. Heart:  Regular rate and rhythm, S1, S2 normal, no murmur, click, rub or gallop. Abdomen:   Soft, mildly tender RUQ and epigastrium. Bowel sounds normal. No masses,  No organomegaly. Extremities: no cyanosis or edema. No calf tenderness or cords. Pulses: 2+ and symmetric all extremities. Skin: Skin color, texture, turgor normal. No rashes or lesions Neurologic: CNII-XII intact. Alert and oriented X 3. Fine motor of hands and fingers normal.   equal.  No cogwheeling or rigidity. Gait not tested at this time. Sensation grossly normal to touch. Gross motor of extremities normal.    
 
Data Review:  
4/9/19  EXAM:  MRI ABD W MRCP W WO CONT INDICATION:   Pancreatitis - acute COMPARISON: Contemporaneous ultrasound. CONTRAST:  7 mL Gadavist. 
TECHNIQUE: MR of the abdomen was performed with coronal SSFSE T2, axial 3-D dual 
echo in and opposed phase T1, axial 2-D bright blood FIESTA FS, 3-D respiratory 
triggered MRCP, thick and thin slab coronal MRCP SSFSE T2, axial SSFSE T2, axial 
FSE T2 BH FS, axial pre-and multiphase postcontrast LAVA, and postcontrast 
coronal LAVA sequences. Renettalyjasmine Ruts FINDINGS: 
The liver is normal.  The gallbladder is distended with small intraluminal 
stones noted. There is redemonstration of 10 mm dilation of the common bile duct 
and mild central intrahepatic biliary dilation. There is smooth tapering of the 
common bile duct and pancreatic head with a very abrupt angular termination of 
the distal common bile duct at the ampulla with no filling defects or strictures 
seen otherwise. The spleen is normal.  The pancreas is normal with no ductal 
dilation and no evidence of pancreas divisum. The adrenal glands are normal.  
The kidneys are normal. 
  
The stomach appears unremarkable. No large or small bowel abnormalities are 
identified. The surrounding musculoskeletal and soft tissue structures are 
unremarkable apart from degenerative spine change. There is no lymphadenopathy or ascites. IMPRESSION: Cholecystolithiasis with distended gallbladder, 10 mm common bile 
duct dilation, and mild central intrahepatic biliary ductal dilation. No 
definite common bile duct stones though a tiny impacted stone at the ampulla is 
somewhat suspected given the appearance of the terminus of the common bile duct 
at the ampulla. 4/9/19 EXAM: Right upper quadrant limited abdominal ultrasound. CLINICAL INDICATION:  Evaluate for cholelithiasis. TECHNIQUE: High-resolution selected color flow evaluation of the right upper 
quadrant performed. COMPARISON:  None. FINDINGS: 
The gallbladder contains stones within the lumen at the gallbladder neck but 
otherwise appears normal with no wall thickening, pericholecystic fluid, or 
other abnormality. The patient reported pain with direct insonation of the 
gallbladder. The common bile duct is dilated and measures 10 mm. The liver is unremarkable with no focal lesion or intrahepatic biliary ductal 
dilation. There is hepatopetal portal venous flow within the liver. The pancreas appears normal with no identified mass or ductal dilation. The right kidney appears normal with no identified calculus, mass, or 
hydronephrosis. Right renal length measures 10.1 cm. IMPRESSION: Cholecystolithiasis with tenderness on direct insonation of the 
gallbladder but no other sonographic signs of cholecystitis. : Bilateral duct 
dilated to 10 mm consider further evaluation with MRCP/ERCP. Recent Days: 
Recent Labs 04/11/19 
0459 04/10/19 
0502 04/09/19 
1306 WBC 6.3 5.4 5.9 HGB 10.2* 9.9* 12.6 HCT 31.9* 31.1* 39.0  165 226 Recent Labs 04/11/19 
0459 04/10/19 
0502 04/09/19 
1306  140 139  
K 4.2 4.1 4.1 * 111* 107 CO2 20* 24 28 GLU 69 82 86 BUN 6 9 12 CREA 0.42* 0.59 0.77 CA 8.2* 8.0* 9.0 MG  --  1.8  --   
PHOS  --  3.4  --   
ALB 3.0* 2.8* 3.7 TBILI 1.8* 3.1* 4.5*  
 SGOT 92* 190* 441* * 196* 316* No results for input(s): PH, PCO2, PO2, HCO3, FIO2 in the last 72 hours. 24 Hour Results: 
Recent Results (from the past 24 hour(s)) METABOLIC PANEL, COMPREHENSIVE Collection Time: 04/11/19  4:59 AM  
Result Value Ref Range Sodium 136 136 - 145 mmol/L Potassium 4.2 3.5 - 5.1 mmol/L Chloride 109 (H) 97 - 108 mmol/L  
 CO2 20 (L) 21 - 32 mmol/L Anion gap 7 5 - 15 mmol/L Glucose 69 65 - 100 mg/dL BUN 6 6 - 20 MG/DL Creatinine 0.42 (L) 0.55 - 1.02 MG/DL  
 BUN/Creatinine ratio 14 12 - 20 GFR est AA >60 >60 ml/min/1.73m2 GFR est non-AA >60 >60 ml/min/1.73m2 Calcium 8.2 (L) 8.5 - 10.1 MG/DL Bilirubin, total 1.8 (H) 0.2 - 1.0 MG/DL  
 ALT (SGPT) 146 (H) 12 - 78 U/L  
 AST (SGOT) 92 (H) 15 - 37 U/L Alk. phosphatase 109 45 - 117 U/L Protein, total 5.8 (L) 6.4 - 8.2 g/dL Albumin 3.0 (L) 3.5 - 5.0 g/dL Globulin 2.8 2.0 - 4.0 g/dL A-G Ratio 1.1 1.1 - 2.2 LIPASE Collection Time: 04/11/19  4:59 AM  
Result Value Ref Range Lipase 393 73 - 393 U/L  
CBC WITH AUTOMATED DIFF Collection Time: 04/11/19  4:59 AM  
Result Value Ref Range WBC 6.3 3.6 - 11.0 K/uL  
 RBC 3.24 (L) 3.80 - 5.20 M/uL  
 HGB 10.2 (L) 11.5 - 16.0 g/dL HCT 31.9 (L) 35.0 - 47.0 % MCV 98.5 80.0 - 99.0 FL  
 MCH 31.5 26.0 - 34.0 PG  
 MCHC 32.0 30.0 - 36.5 g/dL  
 RDW 13.7 11.5 - 14.5 % PLATELET 133 087 - 934 K/uL MPV 10.3 8.9 - 12.9 FL  
 NRBC 0.0 0  WBC ABSOLUTE NRBC 0.00 0.00 - 0.01 K/uL NEUTROPHILS PENDING % LYMPHOCYTES PENDING % MONOCYTES PENDING % EOSINOPHILS PENDING % BASOPHILS PENDING % IMMATURE GRANULOCYTES PENDING %  
 ABS. NEUTROPHILS PENDING K/UL  
 ABS. LYMPHOCYTES PENDING K/UL  
 ABS. MONOCYTES PENDING K/UL  
 ABS. EOSINOPHILS PENDING K/UL  
 ABS. BASOPHILS PENDING K/UL  
 ABS. IMM. GRANS. PENDING K/UL  
 DF PENDING Medications reviewed Current Facility-Administered Medications Medication Dose Route Frequency  pantoprazole (PROTONIX) 40 mg in sodium chloride 0.9% 10 mL injection  40 mg IntraVENous DAILY  0.9% sodium chloride infusion 1,000 mL  1,000 mL IntraVENous CONTINUOUS  
 sodium chloride (NS) flush 5-40 mL  5-40 mL IntraVENous Q8H  
 sodium chloride (NS) flush 5-40 mL  5-40 mL IntraVENous PRN  
 naloxone (NARCAN) injection 0.4 mg  0.4 mg IntraVENous PRN  
 LORazepam (ATIVAN) injection 1 mg  1 mg IntraVENous Q6H PRN  
 HYDROmorphone (PF) (DILAUDID) injection 1 mg  1 mg IntraVENous Q4H PRN  
 acetaminophen (TYLENOL) tablet 650 mg  650 mg Oral Q4H PRN  
 ondansetron (ZOFRAN) injection 4 mg  4 mg IntraVENous Q4H PRN  
 lactated Ringers infusion 250 mL  250 mL IntraVENous CONTINUOUS  
 FLUoxetine (PROzac) capsule 10 mg  10 mg Oral QHS  sertraline (ZOLOFT) tablet 100 mg  100 mg Oral QHS Care Plan discussed with: Patient and Nurse Total time spent with patient: 30 minutes.  
 
Amish Everett MD

## 2019-04-12 VITALS
SYSTOLIC BLOOD PRESSURE: 157 MMHG | HEIGHT: 68 IN | RESPIRATION RATE: 18 BRPM | HEART RATE: 84 BPM | TEMPERATURE: 98.1 F | OXYGEN SATURATION: 96 % | BODY MASS INDEX: 27.28 KG/M2 | WEIGHT: 180 LBS | DIASTOLIC BLOOD PRESSURE: 81 MMHG

## 2019-04-12 PROBLEM — Z90.49 S/P LAPAROSCOPIC CHOLECYSTECTOMY: Status: ACTIVE | Noted: 2019-04-12

## 2019-04-12 LAB
ALBUMIN SERPL-MCNC: 2.6 G/DL (ref 3.5–5)
ALBUMIN/GLOB SERPL: 0.8 {RATIO} (ref 1.1–2.2)
ALP SERPL-CCNC: 99 U/L (ref 45–117)
ALT SERPL-CCNC: 106 U/L (ref 12–78)
ANION GAP SERPL CALC-SCNC: 6 MMOL/L (ref 5–15)
AST SERPL-CCNC: 70 U/L (ref 15–37)
BASOPHILS # BLD: 0 K/UL (ref 0–0.1)
BASOPHILS NFR BLD: 0 % (ref 0–1)
BILIRUB SERPL-MCNC: 0.8 MG/DL (ref 0.2–1)
BUN SERPL-MCNC: 9 MG/DL (ref 6–20)
BUN/CREAT SERPL: 15 (ref 12–20)
CALCIUM SERPL-MCNC: 8.4 MG/DL (ref 8.5–10.1)
CHLORIDE SERPL-SCNC: 112 MMOL/L (ref 97–108)
CO2 SERPL-SCNC: 22 MMOL/L (ref 21–32)
CREAT SERPL-MCNC: 0.61 MG/DL (ref 0.55–1.02)
DIFFERENTIAL METHOD BLD: ABNORMAL
EOSINOPHIL # BLD: 0.1 K/UL (ref 0–0.4)
EOSINOPHIL NFR BLD: 2 % (ref 0–7)
ERYTHROCYTE [DISTWIDTH] IN BLOOD BY AUTOMATED COUNT: 14.1 % (ref 11.5–14.5)
GLOBULIN SER CALC-MCNC: 3.1 G/DL (ref 2–4)
GLUCOSE SERPL-MCNC: 77 MG/DL (ref 65–100)
HCT VFR BLD AUTO: 33 % (ref 35–47)
HGB BLD-MCNC: 10.7 G/DL (ref 11.5–16)
IMM GRANULOCYTES # BLD AUTO: 0 K/UL (ref 0–0.04)
IMM GRANULOCYTES NFR BLD AUTO: 0 % (ref 0–0.5)
LYMPHOCYTES # BLD: 1.9 K/UL (ref 0.8–3.5)
LYMPHOCYTES NFR BLD: 26 % (ref 12–49)
MCH RBC QN AUTO: 31.8 PG (ref 26–34)
MCHC RBC AUTO-ENTMCNC: 32.4 G/DL (ref 30–36.5)
MCV RBC AUTO: 97.9 FL (ref 80–99)
MONOCYTES # BLD: 0.4 K/UL (ref 0–1)
MONOCYTES NFR BLD: 6 % (ref 5–13)
NEUTS SEG # BLD: 4.9 K/UL (ref 1.8–8)
NEUTS SEG NFR BLD: 66 % (ref 32–75)
NRBC # BLD: 0 K/UL (ref 0–0.01)
NRBC BLD-RTO: 0 PER 100 WBC
PLATELET # BLD AUTO: 188 K/UL (ref 150–400)
PMV BLD AUTO: 9.8 FL (ref 8.9–12.9)
POTASSIUM SERPL-SCNC: 3.8 MMOL/L (ref 3.5–5.1)
PROT SERPL-MCNC: 5.7 G/DL (ref 6.4–8.2)
RBC # BLD AUTO: 3.37 M/UL (ref 3.8–5.2)
SODIUM SERPL-SCNC: 140 MMOL/L (ref 136–145)
WBC # BLD AUTO: 7.4 K/UL (ref 3.6–11)

## 2019-04-12 PROCEDURE — 74011250636 HC RX REV CODE- 250/636: Performed by: INTERNAL MEDICINE

## 2019-04-12 PROCEDURE — 85025 COMPLETE CBC W/AUTO DIFF WBC: CPT

## 2019-04-12 PROCEDURE — 80053 COMPREHEN METABOLIC PANEL: CPT

## 2019-04-12 PROCEDURE — 74011250636 HC RX REV CODE- 250/636: Performed by: SURGERY

## 2019-04-12 PROCEDURE — C9113 INJ PANTOPRAZOLE SODIUM, VIA: HCPCS | Performed by: INTERNAL MEDICINE

## 2019-04-12 PROCEDURE — 36415 COLL VENOUS BLD VENIPUNCTURE: CPT

## 2019-04-12 PROCEDURE — 74011250637 HC RX REV CODE- 250/637: Performed by: FAMILY MEDICINE

## 2019-04-12 RX ORDER — HYDROCODONE BITARTRATE AND ACETAMINOPHEN 5; 325 MG/1; MG/1
1 TABLET ORAL
Qty: 10 TAB | Refills: 0 | Status: SHIPPED | OUTPATIENT
Start: 2019-04-12 | End: 2019-04-15

## 2019-04-12 RX ORDER — ACETAMINOPHEN 325 MG/1
650 TABLET ORAL
Qty: 1 TAB | Refills: 0 | Status: SHIPPED
Start: 2019-04-12 | End: 2021-09-24

## 2019-04-12 RX ORDER — CALCIUM CARBONATE 200(500)MG
400 TABLET,CHEWABLE ORAL AS NEEDED
Status: DISCONTINUED | OUTPATIENT
Start: 2019-04-12 | End: 2019-04-12 | Stop reason: HOSPADM

## 2019-04-12 RX ADMIN — Medication 10 ML: at 06:00

## 2019-04-12 RX ADMIN — KETOROLAC TROMETHAMINE 30 MG: 30 INJECTION, SOLUTION INTRAMUSCULAR at 06:00

## 2019-04-12 RX ADMIN — KETOROLAC TROMETHAMINE 30 MG: 30 INJECTION, SOLUTION INTRAMUSCULAR at 13:23

## 2019-04-12 RX ADMIN — ANTACID TABLETS 400 MG: 500 TABLET, CHEWABLE ORAL at 02:02

## 2019-04-12 RX ADMIN — PANTOPRAZOLE SODIUM 40 MG: 40 INJECTION, POWDER, FOR SOLUTION INTRAVENOUS at 10:21

## 2019-04-12 NOTE — DISCHARGE SUMMARY
Physician Discharge Summary     Patient ID:    Odalys Lepe  [id number]  13 y.o.  1975  Chapito Willis MD    Admit date: 4/9/2019    Discharge date and time: 4/12/2019    Admission Diagnoses: Acute gallstone pancreatitis [K85.10]    Chronic Diagnoses:    Problem List as of 4/12/2019 Never Reviewed          Codes Class Noted - Resolved    S/P laparoscopic cholecystectomy ICD-10-CM: Z90.49  ICD-9-CM: V45.89  4/12/2019 - Present    Overview Signed 4/12/2019  8:10 AM by Emil Dao MD     Robotic-assisted laparoscopic cholecystectomy with firefly and IOC. * (Principal) Acute gallstone pancreatitis ICD-10-CM: K85.10  ICD-9-CM: 822.3, 574.20  4/9/2019 - Present        Calculus of gallbladder with biliary obstruction but without cholecystitis ICD-10-CM: K80.21  ICD-9-CM: 574.21  4/9/2019 - Present        Gastroesophageal reflux disease without esophagitis ICD-10-CM: K21.9  ICD-9-CM: 530.81  4/9/2019 - Present        Depression ICD-10-CM: F32.9  ICD-9-CM: 355  4/9/2019 - Present        Attention deficit disorder (ADD) in adult ICD-10-CM: F98.8  ICD-9-CM: 314.00  4/9/2019 - Present        Elevated BP without diagnosis of hypertension ICD-10-CM: R03.0  ICD-9-CM: 796.2  4/9/2019 - Present              Discharge Medications:   Current Discharge Medication List      START taking these medications    Details   HYDROcodone-acetaminophen (NORCO) 5-325 mg per tablet Take 1 Tab by mouth every four (4) hours as needed for Pain for up to 3 days. Max Daily Amount: 6 Tabs. Qty: 10 Tab, Refills: 0    Associated Diagnoses: S/P laparoscopic cholecystectomy      acetaminophen (TYLENOL) 325 mg tablet Take 2 Tabs by mouth every four (4) hours as needed for Pain or Fever. Qty: 1 Tab, Refills: 0         CONTINUE these medications which have NOT CHANGED    Details   sertraline (ZOLOFT) 100 mg tablet Take 100 mg by mouth nightly. FLUoxetine (PROZAC) 10 mg capsule Take 10 mg by mouth nightly. dextroamphetamine-amphetamine (ADDERALL) 10 mg tablet Take 10 mg by mouth two (2) times daily as needed. omeprazole (PRILOSEC) 10 mg capsule Take 10 mg by mouth daily. Follow up Care:    1. Ferdinand Elise MD with in 1 weeks  2. Surgery specialists as directed. Diet:  Regular Diet    Disposition:  Home. Advanced Directive:    Discharge Exam:  [See today's progress note.]    CONSULTATIONS: GI and General Surgery    Significant Diagnostic Studies:   Recent Labs     04/12/19 0657 04/11/19  0459   WBC 7.4 6.3   HGB 10.7* 10.2*   HCT 33.0* 31.9*    168     Recent Labs     04/12/19 0657 04/11/19  0459 04/10/19  0502    136 140   K 3.8 4.2 4.1   * 109* 111*   CO2 22 20* 24   BUN 9 6 9   CREA 0.61 0.42* 0.59   GLU 77 69 82   CA 8.4* 8.2* 8.0*   MG  --   --  1.8   PHOS  --   --  3.4     Recent Labs     04/12/19 0657 04/11/19 0459 04/10/19  0502   SGOT 70* 92* 190*   * 146* 196*   AP 99 109 109   TBILI 0.8 1.8* 3.1*   TP 5.7* 5.8* 5.5*   ALB 2.6* 3.0* 2.8*   GLOB 3.1 2.8 2.7   LPSE  --  393 >3,000*     HOSPITAL COURSE & TREATMENT RENDERED:   Acute gallstone pancreatitis (4/9/2019):   ---IV fluids. ---IV opioids for pain control. IV anti emetics. ---[facility] general surgery  ---[place] 4/11     Calculus of gallbladder with biliary obstruction (4/9/2019) / Elevated LFTs/ Hyperbilirubinemia POA: likely has a CBD stone. ---GI consulted.      Gastroesophageal reflux disease without esophagitis (4/9/2019): start IV pepcid     Depression (4/9/2019) / Attention deficit disorder (ADD) in adult (4/9/2019):   ---resume home medications once taking po     Elevated BP without diagnosis of hypertension (4/9/2019):   ---monitor.    ---IV Hydralazine as needed for now      Code Status:  Full           Subjective:    41 y.o. female who is being admitted for acute gallstone pancreatitis. Ms. Hughes presented to our Emergency Department today complaining of a sudden onset of severe upper abdominal aching pain, radiating to her back and associated with nausea but no vomiting. She denies any chills or fever but has felt generalized itchiness. She says she only drinks alcohol socially. In the ED, she was found to have acute pancreatitis with an elevated lipase and an abdominal ultrasound showed cholecystolithiasis with tenderness on direct insonation of the gallbladder but no other sonographic signs of cholecystitis. : Bilateral duct dilated to 10 mm consider further evaluation with MRCP/ERCP. She will be admitted for further management.  (Dr Mulugeta Rico)     4/10:  Feeling much better today with no nausea/vomiting at this time. Still has some RUQ/epigastric ab pain but again much better than yesterday. LFTs improving. GI has seen. Awaiting Surg opinion.       : Feeling better this am. Labs are improving and she has less pain. Hopefully she is going to the OR today. Appreciate GI and Surgery consults.      : Feeling great. Had lap farhat yesterday. Eating a regular diet. No nausea. Had a little reflux last night. Will stop IVF. AM labs pending. Not requiring pain meds. Review of Systems:   A comprehensive review of systems was negative except for that written in the HPI.     Objective:   Physical Exam:   Visit Vitals  /70 (BP 1 Location: Left arm, BP Patient Position: At rest)   Pulse 70   Temp 98.1 °F (36.7 °C)   Resp 18   Ht 5' 8\" (1.727 m)   Wt 180 lb (81.6 kg)   LMP 2019 (Approximate)   SpO2 95%   BMI 27.37 kg/m²      O2 Device: Room air  Temp (24hrs), Av.7 °F (36.5 °C), Min:97.3 °F (36.3 °C), Max:98.3 °F (36.8 °C)    No intake/output data recorded. 04/10 0701 -  1900  In: 200 [I.V.:200]  Out: -   General:  Alert, cooperative, no distress, appears stated age. Head:  Normocephalic, without obvious abnormality, atraumatic. Eyes:  Conjunctivae/corneas clear. PERRL, EOMs intact. Nose: Nares normal. Septum midline.  Mucosa normal. No drainage or sinus tenderness. Throat: Lips, mucosa, and tongue moist..   Neck: Supple, symmetrical, trachea midline, no adenopathy, thyroid: no enlargement/tenderness/nodules, no carotid bruit and no JVD. Back:   Symmetric, no curvature. ROM normal. No CVA tenderness. Lungs:   Clear to auscultation bilaterally. Chest wall:  No tenderness or deformity. Heart:  Regular rate and rhythm, S1, S2 normal, no murmur, click, rub or gallop. Abdomen:   Soft, Bowel sounds normal.Surgical sites dressed. No masses,  No organomegaly. Extremities: no cyanosis or edema. No calf tenderness or cords. Pulses: 2+ and symmetric all extremities. Skin: Skin color, texture, turgor normal. No rashes or lesions   Neurologic: CNII-XII intact. Alert and oriented X 3. Fine motor of hands and fingers normal.   equal.  No cogwheeling or rigidity. Gait not tested at this time. Sensation grossly normal to touch. Gross motor of extremities normal.        Data Review:   4/9/19  EXAM:  MRI ABD W MRCP W WO CONT  INDICATION:   Pancreatitis - acute  COMPARISON: Contemporaneous ultrasound. CONTRAST:  7 mL Gadavist.  TECHNIQUE: MR of the abdomen was performed with coronal SSFSE T2, axial 3-D dual  echo in and opposed phase T1, axial 2-D bright blood FIESTA FS, 3-D respiratory  triggered MRCP, thick and thin slab coronal MRCP SSFSE T2, axial SSFSE T2, axial  FSE T2 BH FS, axial pre-and multiphase postcontrast LAVA, and postcontrast  coronal LAVA sequences. .    FINDINGS:  The liver is normal.  The gallbladder is distended with small intraluminal  stones noted. There is redemonstration of 10 mm dilation of the common bile duct  and mild central intrahepatic biliary dilation.  There is smooth tapering of the  common bile duct and pancreatic head with a very abrupt angular termination of  the distal common bile duct at the ampulla with no filling defects or strictures  seen otherwise.  The spleen is normal.  The pancreas is normal with no ductal  dilation and no evidence of pancreas divisum.  The adrenal glands are normal.   The kidneys are normal.     The stomach appears unremarkable. No large or small bowel abnormalities are  identified. The surrounding musculoskeletal and soft tissue structures are  unremarkable apart from degenerative spine change. There is no lymphadenopathy or ascites. IMPRESSION: Cholecystolithiasis with distended gallbladder, 10 mm common bile  duct dilation, and mild central intrahepatic biliary ductal dilation. No  definite common bile duct stones though a tiny impacted stone at the ampulla is  somewhat suspected given the appearance of the terminus of the common bile duct  at the ampulla.     4/9/19 EXAM: Right upper quadrant limited abdominal ultrasound. CLINICAL INDICATION:  Evaluate for cholelithiasis. TECHNIQUE: High-resolution selected color flow evaluation of the right upper  quadrant performed. COMPARISON:  None. FINDINGS:  The gallbladder contains stones within the lumen at the gallbladder neck but  otherwise appears normal with no wall thickening, pericholecystic fluid, or  other abnormality. The patient reported pain with direct insonation of the  gallbladder. The common bile duct is dilated and measures 10 mm.    The liver is unremarkable with no focal lesion or intrahepatic biliary ductal  dilation. There is hepatopetal portal venous flow within the liver. The pancreas appears normal with no identified mass or ductal dilation. The right kidney appears normal with no identified calculus, mass, or  hydronephrosis. Right renal length measures 10.1 cm.   IMPRESSION: Cholecystolithiasis with tenderness on direct insonation of the  gallbladder but no other sonographic signs of cholecystitis. : Bilateral duct  dilated to 10 mm consider further evaluation with MRCP/ERCP.                Signed:  Andre Jacobo MD  4/12/2019  1:35 PM

## 2019-04-12 NOTE — PROGRESS NOTES
Bedside shift change report given to Alessandro (oncoming nurse) by Ministerio Snyder (offgoing nurse). Report included the following information SBAR, Kardex, MAR and Recent Results.

## 2019-04-12 NOTE — PROGRESS NOTES
Patient is having heartburn; difficult to sleep. Called Dr. Steven Garcia, on-call, for medication. Per MD, order entered for 2 tums, prn for reflux

## 2019-04-12 NOTE — PROGRESS NOTES
Progress Note Patient: Galdino Daly MRN: [id number]  SSN: [id number] YOB: 1975  Age: 41 y.o. Sex: female Admit Date: 2019 
 
1 Day Post-Op Procedure:  Procedure(s): 
ROBOTIC ASSISTED LAPAROSCOPIC CHOLECYSTECTOMY WITH FIRE FLY, CHOLANGIOGRAMS Subjective:  
 
Ms. Marika Horvath feels fine. She is tolerating her diet and denies pain. Objective:  
 
Visit Vitals /84 (BP 1 Location: Right arm, BP Patient Position: At rest) Pulse 65 Temp 98.6 °F (37 °C) Resp 18 Ht 5' 8\" (1.727 m) Wt 180 lb (81.6 kg) SpO2 98% BMI 27.37 kg/m² Temp (24hrs), Av.8 °F (36.6 °C), Min:97.3 °F (36.3 °C), Max:98.6 °F (37 °C) Physical Exam:   
GENERAL: alert, cooperative, no distress, EYE: negative findings: anicteric sclera, ABDOMEN: Soft, ND, ND. The dressings are intact and dry. Lab Review:  
CMP:  
Lab Results Component Value Date/Time  2019 06:57 AM  
 K 3.8 2019 06:57 AM  
  (H) 2019 06:57 AM  
 CO2 22 2019 06:57 AM  
 AGAP 6 2019 06:57 AM  
 GLU 77 2019 06:57 AM  
 BUN 9 2019 06:57 AM  
 CREA 0.61 2019 06:57 AM  
 GFRAA >60 2019 06:57 AM  
 GFRNA >60 2019 06:57 AM  
 CA 8.4 (L) 2019 06:57 AM  
 ALB 2.6 (L) 2019 06:57 AM  
 TP 5.7 (L) 2019 06:57 AM  
 GLOB 3.1 2019 06:57 AM  
 AGRAT 0.8 (L) 2019 06:57 AM  
 SGOT 70 (H) 2019 06:57 AM  
  (H) 2019 06:57 AM  
 
CBC:  
Lab Results Component Value Date/Time WBC 7.4 2019 06:57 AM  
 HGB 10.7 (L) 2019 06:57 AM  
 HCT 33.0 (L) 2019 06:57 AM  
  2019 06:57 AM  
 
 
Assessment:  
 
Hospital Problems  Never Reviewed Codes Class Noted POA  
 S/P laparoscopic cholecystectomy ICD-10-CM: Z90.49 ICD-9-CM: V45.89  2019 Unknown  Overview Signed 2019  8:10 AM by Maldonado Willingham MD  
 Robotic-assisted laparoscopic cholecystectomy with firefly and IOC. * (Principal) Acute gallstone pancreatitis ICD-10-CM: K85.10 ICD-9-CM: 087.2, 574.20  4/9/2019 Yes Calculus of gallbladder with biliary obstruction but without cholecystitis ICD-10-CM: K80.21 ICD-9-CM: 574.21  4/9/2019 Yes Gastroesophageal reflux disease without esophagitis ICD-10-CM: K21.9 ICD-9-CM: 530.81  4/9/2019 Yes Depression ICD-10-CM: F32.9 ICD-9-CM: 844  4/9/2019 Yes Attention deficit disorder (ADD) in adult ICD-10-CM: F98.8 ICD-9-CM: 314.00  4/9/2019 Yes Elevated BP without diagnosis of hypertension ICD-10-CM: R03.0 ICD-9-CM: 796.2  4/9/2019 Yes Plan/Recommendations/Medical Decision Making:  
 
Bilirubin normal. 
D/C home. F/u with me in the office in 2 to 3 weeks.

## 2019-04-12 NOTE — PROGRESS NOTES
Documented 4/12/19. Patient visited by [facility] Partner Volunteer on Medical Surgical Unit on 4/11/19. Rev. Brooks Mendez, [facility]  paging service: [phone])

## 2019-04-12 NOTE — PROGRESS NOTES
Daily Progress Note: 4/12/2019 Vaishali Estrada MD 
 
Assessment/Plan:  
Acute gallstone pancreatitis (4/9/2019):  
---IV fluids. ---IV opioids for pain control. IV anti emetics. ---Consulted GI and general surgery 
---Lap Vale 4/11 
  
Calculus of gallbladder with biliary obstruction (4/9/2019) / Elevated LFTs/ Hyperbilirubinemia POA: likely has a CBD stone. ---GI consulted.  
 
Gastroesophageal reflux disease without esophagitis (4/9/2019): start IV pepcid 
  
Depression (4/9/2019) / Attention deficit disorder (ADD) in adult (4/9/2019):  
---resume home medications once taking po 
  
Elevated BP without diagnosis of hypertension (4/9/2019):  
---monitor. ---IV Hydralazine as needed for now  
  
Code Status:  Full Problem List: 
Problem List as of 4/12/2019 Never Reviewed Codes Class Noted - Resolved * (Principal) Acute gallstone pancreatitis ICD-10-CM: K85.10 ICD-9-CM: 432.2, 574.20  4/9/2019 - Present Calculus of gallbladder with biliary obstruction but without cholecystitis ICD-10-CM: K80.21 ICD-9-CM: 574.21  4/9/2019 - Present Gastroesophageal reflux disease without esophagitis ICD-10-CM: K21.9 ICD-9-CM: 530.81  4/9/2019 - Present Depression ICD-10-CM: F32.9 ICD-9-CM: 114  4/9/2019 - Present Attention deficit disorder (ADD) in adult ICD-10-CM: F98.8 ICD-9-CM: 314.00  4/9/2019 - Present Elevated BP without diagnosis of hypertension ICD-10-CM: R03.0 ICD-9-CM: 796.2  4/9/2019 - Present Subjective:  
 41 y.o. female who is being admitted for acute gallstone pancreatitis. Ms. Krzysztof Gross presented to our Emergency Department today complaining of a sudden onset of severe upper abdominal aching pain, radiating to her back and associated with nausea but no vomiting. She denies any chills or fever but has felt generalized itchiness.  She says she only drinks alcohol socially. In the ED, she was found to have acute pancreatitis with an elevated lipase and an abdominal ultrasound showed cholecystolithiasis with tenderness on direct insonation of the gallbladder but no other sonographic signs of cholecystitis. : Bilateral duct dilated to 10 mm consider further evaluation with MRCP/ERCP. She will be admitted for further management. (Dr Melodie Moran) 4/10:  Feeling much better today with no nausea/vomiting at this time. Still has some RUQ/epigastric ab pain but again much better than yesterday. LFTs improving. GI has seen. Awaiting Surg opinion.   
  
: Feeling better this am. Labs are improving and she has less pain. Hopefully she is going to the OR today. Appreciate GI and Surgery consults. : Feeling great. Had lap farhat yesterday. Eating a regular diet. No nausea. Had a little reflux last night. Will stop IVF. AM labs pending. Not requiring pain meds. Review of Systems: A comprehensive review of systems was negative except for that written in the HPI. Objective:  
Physical Exam:  
Visit Vitals /70 (BP 1 Location: Left arm, BP Patient Position: At rest) Pulse 70 Temp 98.1 °F (36.7 °C) Resp 18 Ht 5' 8\" (1.727 m) Wt 180 lb (81.6 kg) LMP 2019 (Approximate) SpO2 95% BMI 27.37 kg/m² O2 Device: Room air Temp (24hrs), Av.7 °F (36.5 °C), Min:97.3 °F (36.3 °C), Max:98.3 °F (36.8 °C) No intake/output data recorded. 04/10 07 -  190 In: 200 [I.V.:200] Out: - General:  Alert, cooperative, no distress, appears stated age. Head:  Normocephalic, without obvious abnormality, atraumatic. Eyes:  Conjunctivae/corneas clear. PERRL, EOMs intact. Nose: Nares normal. Septum midline. Mucosa normal. No drainage or sinus tenderness. Throat: Lips, mucosa, and tongue moist.. Neck: Supple, symmetrical, trachea midline, no adenopathy, thyroid: no enlargement/tenderness/nodules, no carotid bruit and no JVD. Back:   Symmetric, no curvature. ROM normal. No CVA tenderness. Lungs:   Clear to auscultation bilaterally. Chest wall:  No tenderness or deformity. Heart:  Regular rate and rhythm, S1, S2 normal, no murmur, click, rub or gallop. Abdomen:   Soft, Bowel sounds normal.Surgical sites dressed. No masses,  No organomegaly. Extremities: no cyanosis or edema. No calf tenderness or cords. Pulses: 2+ and symmetric all extremities. Skin: Skin color, texture, turgor normal. No rashes or lesions Neurologic: CNII-XII intact. Alert and oriented X 3. Fine motor of hands and fingers normal.   equal.  No cogwheeling or rigidity. Gait not tested at this time. Sensation grossly normal to touch. Gross motor of extremities normal.    
 
Data Review:  
4/9/19  EXAM:  MRI ABD W MRCP W WO CONT INDICATION:   Pancreatitis - acute COMPARISON: Contemporaneous ultrasound. CONTRAST:  7 mL Gadavist. 
TECHNIQUE: MR of the abdomen was performed with coronal SSFSE T2, axial 3-D dual 
echo in and opposed phase T1, axial 2-D bright blood FIESTA FS, 3-D respiratory 
triggered MRCP, thick and thin slab coronal MRCP SSFSE T2, axial SSFSE T2, axial 
FSE T2 BH FS, axial pre-and multiphase postcontrast LAVA, and postcontrast 
coronal LAVA sequences. Kassandra Mellow FINDINGS: 
The liver is normal.  The gallbladder is distended with small intraluminal 
stones noted. There is redemonstration of 10 mm dilation of the common bile duct 
and mild central intrahepatic biliary dilation. There is smooth tapering of the 
common bile duct and pancreatic head with a very abrupt angular termination of 
the distal common bile duct at the ampulla with no filling defects or strictures 
seen otherwise. The spleen is normal.  The pancreas is normal with no ductal 
dilation and no evidence of pancreas divisum. The adrenal glands are normal.  
The kidneys are normal. 
  
The stomach appears unremarkable.  No large or small bowel abnormalities are 
 identified. The surrounding musculoskeletal and soft tissue structures are 
unremarkable apart from degenerative spine change. There is no lymphadenopathy or ascites. IMPRESSION: Cholecystolithiasis with distended gallbladder, 10 mm common bile 
duct dilation, and mild central intrahepatic biliary ductal dilation. No 
definite common bile duct stones though a tiny impacted stone at the ampulla is 
somewhat suspected given the appearance of the terminus of the common bile duct 
at the ampulla. 4/9/19 EXAM: Right upper quadrant limited abdominal ultrasound. CLINICAL INDICATION:  Evaluate for cholelithiasis. TECHNIQUE: High-resolution selected color flow evaluation of the right upper 
quadrant performed. COMPARISON:  None. FINDINGS: 
The gallbladder contains stones within the lumen at the gallbladder neck but 
otherwise appears normal with no wall thickening, pericholecystic fluid, or 
other abnormality. The patient reported pain with direct insonation of the 
gallbladder. The common bile duct is dilated and measures 10 mm. The liver is unremarkable with no focal lesion or intrahepatic biliary ductal 
dilation. There is hepatopetal portal venous flow within the liver. The pancreas appears normal with no identified mass or ductal dilation. The right kidney appears normal with no identified calculus, mass, or 
hydronephrosis. Right renal length measures 10.1 cm. IMPRESSION: Cholecystolithiasis with tenderness on direct insonation of the 
gallbladder but no other sonographic signs of cholecystitis. : Bilateral duct 
dilated to 10 mm consider further evaluation with MRCP/ERCP. Recent Days: 
Recent Labs 04/11/19 
0459 04/10/19 
0502 04/09/19 
1306 WBC 6.3 5.4 5.9 HGB 10.2* 9.9* 12.6 HCT 31.9* 31.1* 39.0  165 226 Recent Labs 04/11/19 
0459 04/10/19 
0502 04/09/19 
1306  140 139  
K 4.2 4.1 4.1 * 111* 107 CO2 20* 24 28 GLU 69 82 86 BUN 6 9 12 CREA 0.42* 0.59 0.77 CA 8.2* 8.0* 9.0 MG  --  1.8  --   
PHOS  --  3.4  --   
ALB 3.0* 2.8* 3.7 TBILI 1.8* 3.1* 4.5* SGOT 92* 190* 441* * 196* 316* No results for input(s): PH, PCO2, PO2, HCO3, FIO2 in the last 72 hours. 24 Hour Results: No results found for this or any previous visit (from the past 24 hour(s)). Medications reviewed Current Facility-Administered Medications Medication Dose Route Frequency  calcium carbonate (TUMS) chewable tablet 400 mg [elemental]  400 mg Oral PRN  
 HYDROcodone-acetaminophen (NORCO) 5-325 mg per tablet 1 Tab  1 Tab Oral Q4H PRN  
 ketorolac (TORADOL) injection 30 mg  30 mg IntraVENous Q6H  
 enoxaparin (LOVENOX) injection 40 mg  40 mg SubCUTAneous Q24H  pantoprazole (PROTONIX) 40 mg in sodium chloride 0.9% 10 mL injection  40 mg IntraVENous DAILY  sodium chloride (NS) flush 5-40 mL  5-40 mL IntraVENous Q8H  
 sodium chloride (NS) flush 5-40 mL  5-40 mL IntraVENous PRN  
 naloxone (NARCAN) injection 0.4 mg  0.4 mg IntraVENous PRN  
 LORazepam (ATIVAN) injection 1 mg  1 mg IntraVENous Q6H PRN  
 HYDROmorphone (PF) (DILAUDID) injection 1 mg  1 mg IntraVENous Q4H PRN  
 acetaminophen (TYLENOL) tablet 650 mg  650 mg Oral Q4H PRN  
 ondansetron (ZOFRAN) injection 4 mg  4 mg IntraVENous Q4H PRN  
 FLUoxetine (PROzac) capsule 10 mg  10 mg Oral QHS  sertraline (ZOLOFT) tablet 100 mg  100 mg Oral QHS Care Plan discussed with: Patient and Nurse Total time spent with patient: 30 minutes.  
 
Easton Ribeiro MD

## 2019-04-12 NOTE — PROGRESS NOTES
Nutrition Assessment: 
 
RECOMMENDATIONS/INTERVENTION(S):  
Recommend Low fat / GI lite Monitor weight, GI status, LFTs Added Ensure HP Appreciate new weight ASSESSMENT:  
4/12: POD #1 cholecystectomy. Pt tolerating breakfast. Denies issues after eating. Added Ensure HP for lunch for low fat post surgery protein intakes. 4/10: 41 yr old female admitted for abdominal pain, gallstone pancreatitis. PMHx: Gastric sleeve in June 2018, GERD, ADD. Pt states she has lost 100 lbs since June 2018. No weight hx in system to corroborate. Significant for time frame. Pt might have gallbladder removed tomorrow if LFTs / lipase trends down or maybe ERCP if labs remain elevated. Will continue to follow. No n/v. Last PO was Monday morning (4/8). Last BM 4/7 per pt. Labs: , . Lipase >3000. SUBJECTIVE/OBJECTIVE:  
Diet Order: Regular 
% Eaten:  No data found. Pertinent Medications: [x] Reviewed Labs reviewed:  [x] Anthropometrics: Height: 5' 8\" (172.7 cm) Weight: 81.6 kg (180 lb) IBW (%IBW):   ( ) UBW (%UBW):   (  %) BMI: Body mass index is 27.37 kg/m². This BMI is indicative of: 
 
 [] Underweight    [] Normal    [x] Overweight    []  Obesity    []  Extreme Obesity (BMI>40) Estimated Nutrition Needs (Based on): 1930 Kcals/day(BMR(4746iy8.3)) , 62 g(-77g/day(0.8-1.0g/kg)) Protein Carbohydrate: At Least 130 g/day  Fluids: 1950 mL/day (1mL/kg rounded to 50 mL) Last BM: 4/7  [x]Active     []Hyperactive  []Hypoactive       [] Absent   BS Skin:    [x] Intact   [] Incision  [] Breakdown   [] DTI   [] Tears/Excoriation/Abrasion  []Edema [] Other: Wt Readings from Last 30 Encounters:  
04/11/19 81.6 kg (180 lb) 04/09/19 77.1 kg (170 lb) NUTRITION DIAGNOSES:  
Problem:  Altered GI function Etiology: related to alteration in GI structure/function Signs/Symptoms: as evidenced by recent hx of gastric sleeve, gallstone pancreatitis NUTRITION INTERVENTIONS: 
 Meals/Snacks: General/healthful diet   Supplements: Commercial supplement GOAL:  
Pt will advance diet within 2-4 days and tolerate PO w/o pain Cultural, Taoist, or Ethnic Dietary Needs: None LEARNING NEEDS (Diet, Food/Nutrient-Drug Interaction):  
 [x] None Identified 
 [] Identified and Education Provided/Documented 
 [] Identified and Pt declined/was not appropriate [x] Interdisciplinary Care Plan Reviewed/Documented  
 [x] Discharge Needs: General healthful diet, small portion sizes [] No Nutrition Related Discharge Needs NUTRITION RISK:  
Pt Is At Nutrition Risk  [x] No Nutrition Risk Identified  [] PT SEEN FOR:  
 []  MD Consult: []Calorie Count []Diabetic Diet Education []Diet Education []Electrolyte Management []General Nutrition Management and Supplements []Management of Tube Feeding []TPN Recommendations []  RN Referral:  []MST score >=2 
   []Enteral/Parenteral Nutrition PTA []Pregnant: Gestational DM or Multigestation  
              [] Pressure Ulcer 
   
[]  Low BMI      []  Length of Stay       [] Dysphagia Diet     [] Ventilator      [x] Follow-Up Previous Recommendations: 
 [x] Implemented          [] Not Implemented          [] Not Applicable Previous Goal: 
 [x] Met              [] Progressing Towards Goal              [] Not Progressing Towards Goal   [] Not Applicable Denilson Phillips RD Pager: [phone] Office: [phone]

## 2019-04-12 NOTE — DISCHARGE INSTRUCTIONS
Patient Education        Cholecystectomy: What to Expect at Home  Your Recovery  After your surgery, it is normal to feel weak and tired for several days after you return home. Your belly may be swollen. If you had laparoscopic surgery, you may also have pain in your shoulder for about 24 hours. You may have gas or need to burp a lot at first, and a few people get diarrhea. The diarrhea usually goes away in 2 to 4 weeks, but it may last longer. How quickly you recover depends on whether you had a laparoscopic or open surgery. · For a laparoscopic surgery, most people can go back to work or their normal routine in 1 to 2 weeks, but it may take longer, depending on the type of work you do. This care sheet gives you a general idea about how long it will take for you to recover; however, each person recovers at a different pace. Follow the steps below to get better as quickly as possible. How can you care for yourself at home? Activity    · Rest when you feel tired. Getting enough sleep will help you recover.     · Try to walk each day. Start out by walking a little more than you did the day before. Gradually increase the amount you walk. Walking boosts blood flow and helps prevent pneumonia and constipation.     · For about 2 weeks, avoid lifting anything that would make you strain. This may include a child, heavy grocery bags and milk containers, a heavy briefcase or backpack, cat litter or dog food bags, or a vacuum .     · Avoid strenuous activities, such as biking, jogging, weightlifting, and aerobic exercise, until your doctor says it is okay.     · You may shower 24 hours after surgery, if your doctor okays it. Pat the cuts (incisions) dry. Do not take a bath for the first 2 weeks, and until after seen by your doctor.     · You may drive after 3 days and when you are no longer taking narcotic pain medicine and can quickly move your foot from the gas pedal to the brake!   You must also be able to sit comfortably for a long period of time, even if you do not plan to go far because you might get caught in traffic.     · For a laparoscopic surgery, most people can go back to work or their normal routine in 1 to 2 weeks, but it may take longer.      ·     Diet     · Eat smaller meals more often instead of fewer larger meals. You can eat a normal diet. If your stomach is upset, try bland, low-fat foods like plain rice, broiled chicken, toast, and yogurt, and avoid eating fatty foods for about 1 month. Fatty foods include hamburger, whole milk, cheese, and many snack foods.       · Drink plenty of fluids (unless your doctor tells you not to).   · If you have diarrhea, try avoiding spicy foods, dairy products, fatty foods, and alcohol. You can also watch to see if specific foods cause it, and stop eating them. If the diarrhea continues for more than 2 weeks, talk to your doctor.     · You may notice that your bowel movements are not regular right after your surgery. This is common. Try to avoid constipation and straining with bowel movements. You may want to take a fiber supplement every day. If you have not had a bowel movement after a couple of days, ask your doctor about taking a mild laxative. Medicines    · You can restart your medicines. Your doctor will also give you instructions about taking any new medicines.     · If you take blood thinners, such as warfarin (Coumadin), be sure to talk to your doctor. Your doctor will tell you if and when to start taking those medicines again. Make sure that you understand exactly what your doctor wants you to do.     · Take pain medicines exactly as directed. ? If the doctor gave you a prescription medicine for pain, take it as prescribed. ? If you are not taking a prescription pain medicine, take an over-the-counter medicine such as acetaminophen (Tylenol), ibuprofen (Advil, Motrin), or naproxen (Aleve).   Read and follow all instructions on the label.  ? Do not take two or more pain medicines at the same time unless the doctor told you to. Many pain medicines contain acetaminophen, which is Tylenol. Too much Tylenol can be harmful.     · If you think your pain medicine is making you sick to your stomach:  ? Take your medicine after meals (unless your doctor tells you not to). ? Ask your doctor for a different pain medicine.     · If your doctor prescribed antibiotics, take them as directed. Do not stop taking them just because you feel better. You need to take the full course of antibiotics. Incision care    · Remove your bandages on Saturday, 4/13. You may leave your incisions uncovered. · If you have strips of tape on the incision, or cut, leave the tape on for a week or until it falls off.     · After 24 to 48 hours, wash the area daily with warm, soapy water, and pat it dry.     ·      · Keep the incisions clean and dry. You may cover it with a gauze bandage if it weeps or rubs against clothing. Change the bandage every day.    Ice    · To reduce swelling and pain, put ice or a cold pack on your belly for 10 to 20 minutes at a time. Do this every 1 to 2 hours. Put a thin cloth between the ice and your skin. Follow-up care is a key part of your treatment and safety. Be sure to make and go to all appointments, and call your doctor if you are having problems. It's also a good idea to know your test results and keep a list of the medicines you take. When should you call for help? Call 911 anytime you think you may need emergency care. For example, call if:    · You passed out (lost consciousness).     · You are short of breath. B[place]l Seton your doctor now or seek immediate medical care if:    · You are sick to your stomach and cannot drink fluids.     · You have abdominal pain that does not get better when you take your pain medicine.   · Your eyes turn jaundice (yellow).     · You cannot pass stool or gas.     · You have signs of infection, such as:  ? Increased pain, swelling, warmth, or redness. ? Red streaks leading from the incision. ? Pus draining from the incision. ? A fever > 101.     · Bright red blood has soaked through the bandage over your incision.     · You have loose stitches, or your incision comes open.     · You have signs of a blood clot in your leg (called a deep vein thrombosis), such as:  ? Pain in your calf, back of knee, thigh, or groin. ? Redness and swelling in your leg or groin.    Watch closely for any changes in your health, and be sure to contact your doctor if you have any problems. Where can you learn more? Go to http://aysha-juan manuel.info/. Enter [id number] [id number] [phone] in the search box to learn more about \"Cholecystectomy: What to Expect at Home. \"  Current as of: March 27, 2018  Content Version: 11.9  © 9857-3221 [employer]. Care instructions adapted under license by [employer] (which disclaims liability or warranty for this information). If you have questions about a medical condition or this instruction, always ask your healthcare professional. [place] [address] any warranty or liability for your use of this information. Netta Rand.  Yennifer Mathew MD, FACS  General Surgery at [facility], [address]  Nena Williamson [facility]  [phone]  Fax [phone]

## 2019-04-15 ENCOUNTER — TELEPHONE (OUTPATIENT)
Dept: SURGERY | Age: 44
End: 2019-04-15

## 2019-04-15 NOTE — TELEPHONE ENCOUNTER
Called pt to follow up after surgery. Patient identified with three patient identifiers. How are you doing:doing alright  Are you having any pain:some in lower abd, otherwise sore  Are you taking pain meds:as needed       If yes- recommended any OTC constipation treatment if needed  Have you had any nausea or vomiting:no  How is your appetite (eating & drinking):alright  Normal BM & urine output:urine but no BM but will take laxative  Pt notified to take dressing off after 48 hrs: already done  Do they have a drain:no  Are they keeping track of output:n/a  Did they review their discharge instructions:yes  Any other concerns:No  Your follow up office appt is: PSR has left several messages to set up post-op f/u appt (see telephone encounters for 4/15/19 & 4/16/19). I called on 4/17/19 and LM to call office to set up post-op f/u appt. Pt did not voice any other concerns. Pt will call with any problems or questions.

## 2019-04-26 ENCOUNTER — OFFICE VISIT (OUTPATIENT)
Dept: SURGERY | Age: 44
End: 2019-04-26

## 2019-04-26 VITALS
DIASTOLIC BLOOD PRESSURE: 73 MMHG | BODY MASS INDEX: 25.46 KG/M2 | HEART RATE: 82 BPM | OXYGEN SATURATION: 98 % | TEMPERATURE: 98.2 F | HEIGHT: 68 IN | RESPIRATION RATE: 14 BRPM | WEIGHT: 168 LBS | SYSTOLIC BLOOD PRESSURE: 126 MMHG

## 2019-04-26 DIAGNOSIS — Z90.49 S/P LAPAROSCOPIC CHOLECYSTECTOMY: Primary | ICD-10-CM

## 2019-04-26 PROBLEM — K80.21 CALCULUS OF GALLBLADDER WITH BILIARY OBSTRUCTION BUT WITHOUT CHOLECYSTITIS: Status: RESOLVED | Noted: 2019-04-09 | Resolved: 2019-04-26

## 2019-04-26 PROBLEM — K85.10 ACUTE GALLSTONE PANCREATITIS: Status: RESOLVED | Noted: 2019-04-09 | Resolved: 2019-04-26

## 2019-04-26 NOTE — PROGRESS NOTES
1. Have you been to the ER, urgent care clinic since your last visit? Hospitalized since your last visit? No    2. Have you seen or consulted any other health care providers outside of the [address] [place] [address] since your last visit? Include any pap smears or colon screening.  No

## 2019-04-26 NOTE — PROGRESS NOTES
Subjective:      Bryanna Hernandez is a 41 y.o. white female presents for postop care 2 weeks following a lap farhat. Ms. Salena Habermann is doing fine. Her appetite is good, and is eating a regular diet without difficulty. Her bowel movements are regular with diarrhea. She is not having any pain or problems with her incisions. Objective:     Visit Vitals  /73 (BP 1 Location: Left arm, BP Patient Position: Sitting)   Pulse 82   Temp 98.2 °F (36.8 °C) (Oral)   Resp 14   Ht 5' 8\" (1.727 m)   Wt 168 lb (76.2 kg)   SpO2 98%   BMI 25.54 kg/m²       General:  alert, cooperative, no distress   Abdomen:  Soft, NT, ND. The incisions are clean, dry, and intact with no erythema. HEENT:  Sclerae are anicteric. Assessment:     1st POV, s/p robot lap farhat with firefly and IOC. Plan:     The pathology report was discussed with Ms. Salena Habermann. She is doing fine and can f/u prn. I have instructed to take Imodium if the diarrhea becomes bothersome.

## 2021-02-23 ENCOUNTER — APPOINTMENT (OUTPATIENT)
Dept: ULTRASOUND IMAGING | Age: 46
End: 2021-02-23
Attending: PHYSICIAN ASSISTANT
Payer: COMMERCIAL

## 2021-02-23 ENCOUNTER — HOSPITAL ENCOUNTER (EMERGENCY)
Age: 46
Discharge: HOME | End: 2021-02-23
Attending: EMERGENCY MEDICINE
Payer: COMMERCIAL

## 2021-02-23 VITALS
DIASTOLIC BLOOD PRESSURE: 75 MMHG | RESPIRATION RATE: 16 BRPM | WEIGHT: 159 LBS | SYSTOLIC BLOOD PRESSURE: 115 MMHG | OXYGEN SATURATION: 98 % | TEMPERATURE: 97.7 F | HEIGHT: 68 IN | HEART RATE: 82 BPM | BODY MASS INDEX: 24.1 KG/M2

## 2021-02-23 DIAGNOSIS — O23.41 URINARY TRACT INFECTION IN MOTHER DURING FIRST TRIMESTER OF PREGNANCY: Primary | ICD-10-CM

## 2021-02-23 LAB
ABO + RH BLD: NORMAL
ALBUMIN SERPL-MCNC: 3.1 G/DL (ref 3.5–5)
ALBUMIN/GLOB SERPL: 0.8 {RATIO} (ref 1.1–2.2)
ALP SERPL-CCNC: 43 U/L (ref 45–117)
ALT SERPL-CCNC: 15 U/L (ref 12–78)
ANION GAP SERPL CALC-SCNC: 5 MMOL/L (ref 5–15)
APPEARANCE UR: ABNORMAL
AST SERPL-CCNC: 12 U/L (ref 15–37)
BACTERIA URNS QL MICRO: ABNORMAL /HPF
BASOPHILS # BLD: 0 K/UL (ref 0–0.1)
BASOPHILS NFR BLD: 1 % (ref 0–1)
BILIRUB SERPL-MCNC: 0.4 MG/DL (ref 0.2–1)
BILIRUB UR QL: NEGATIVE
BLOOD BANK CMNT PATIENT-IMP: NORMAL
BUN SERPL-MCNC: 8 MG/DL (ref 6–20)
BUN/CREAT SERPL: 12 (ref 12–20)
CALCIUM SERPL-MCNC: 8.7 MG/DL (ref 8.5–10.1)
CHLORIDE SERPL-SCNC: 105 MMOL/L (ref 97–108)
CO2 SERPL-SCNC: 26 MMOL/L (ref 21–32)
COLOR UR: ABNORMAL
COMMENT, HOLDF: NORMAL
CREAT SERPL-MCNC: 0.68 MG/DL (ref 0.55–1.02)
DIFFERENTIAL METHOD BLD: ABNORMAL
EOSINOPHIL # BLD: 0.1 K/UL (ref 0–0.4)
EOSINOPHIL NFR BLD: 2 % (ref 0–7)
EPITH CASTS URNS QL MICRO: ABNORMAL /LPF
ERYTHROCYTE [DISTWIDTH] IN BLOOD BY AUTOMATED COUNT: 14 % (ref 11.5–14.5)
GLOBULIN SER CALC-MCNC: 4 G/DL (ref 2–4)
GLUCOSE SERPL-MCNC: 75 MG/DL (ref 65–100)
GLUCOSE UR STRIP.AUTO-MCNC: NEGATIVE MG/DL
HCG SERPL-ACNC: ABNORMAL MIU/ML (ref 0–6)
HCT VFR BLD AUTO: 31.7 % (ref 35–47)
HGB BLD-MCNC: 10.5 G/DL (ref 11.5–16)
HGB UR QL STRIP: ABNORMAL
HYALINE CASTS URNS QL MICRO: ABNORMAL /LPF (ref 0–5)
IMM GRANULOCYTES # BLD AUTO: 0 K/UL (ref 0–0.04)
IMM GRANULOCYTES NFR BLD AUTO: 1 % (ref 0–0.5)
KETONES UR QL STRIP.AUTO: ABNORMAL MG/DL
LEUKOCYTE ESTERASE UR QL STRIP.AUTO: ABNORMAL
LIPASE SERPL-CCNC: 75 U/L (ref 73–393)
LYMPHOCYTES # BLD: 1.3 K/UL (ref 0.8–3.5)
LYMPHOCYTES NFR BLD: 21 % (ref 12–49)
MCH RBC QN AUTO: 31.2 PG (ref 26–34)
MCHC RBC AUTO-ENTMCNC: 33.1 G/DL (ref 30–36.5)
MCV RBC AUTO: 94.1 FL (ref 80–99)
MONOCYTES # BLD: 0.7 K/UL (ref 0–1)
MONOCYTES NFR BLD: 11 % (ref 5–13)
NEUTS SEG # BLD: 4.1 K/UL (ref 1.8–8)
NEUTS SEG NFR BLD: 64 % (ref 32–75)
NITRITE UR QL STRIP.AUTO: POSITIVE
NRBC # BLD: 0 K/UL (ref 0–0.01)
NRBC BLD-RTO: 0 PER 100 WBC
PH UR STRIP: 6 [PH] (ref 5–8)
PLATELET # BLD AUTO: 227 K/UL (ref 150–400)
PMV BLD AUTO: 9.9 FL (ref 8.9–12.9)
POTASSIUM SERPL-SCNC: 4.3 MMOL/L (ref 3.5–5.1)
PROT SERPL-MCNC: 7.1 G/DL (ref 6.4–8.2)
PROT UR STRIP-MCNC: NEGATIVE MG/DL
RBC # BLD AUTO: 3.37 M/UL (ref 3.8–5.2)
RBC #/AREA URNS HPF: ABNORMAL /HPF (ref 0–5)
SAMPLES BEING HELD,HOLD: NORMAL
SODIUM SERPL-SCNC: 136 MMOL/L (ref 136–145)
SP GR UR REFRACTOMETRY: 1.02 (ref 1–1.03)
UR CULT HOLD, URHOLD: NORMAL
UROBILINOGEN UR QL STRIP.AUTO: 0.2 EU/DL (ref 0.2–1)
WBC # BLD AUTO: 6.2 K/UL (ref 3.6–11)
WBC URNS QL MICRO: ABNORMAL /HPF (ref 0–4)

## 2021-02-23 PROCEDURE — 87186 SC STD MICRODIL/AGAR DIL: CPT

## 2021-02-23 PROCEDURE — 83690 ASSAY OF LIPASE: CPT

## 2021-02-23 PROCEDURE — 87086 URINE CULTURE/COLONY COUNT: CPT

## 2021-02-23 PROCEDURE — 86900 BLOOD TYPING SEROLOGIC ABO: CPT

## 2021-02-23 PROCEDURE — 85025 COMPLETE CBC W/AUTO DIFF WBC: CPT

## 2021-02-23 PROCEDURE — 76817 TRANSVAGINAL US OBSTETRIC: CPT

## 2021-02-23 PROCEDURE — 74011250637 HC RX REV CODE- 250/637: Performed by: PHYSICIAN ASSISTANT

## 2021-02-23 PROCEDURE — 36415 COLL VENOUS BLD VENIPUNCTURE: CPT

## 2021-02-23 PROCEDURE — 84702 CHORIONIC GONADOTROPIN TEST: CPT

## 2021-02-23 PROCEDURE — 99283 EMERGENCY DEPT VISIT LOW MDM: CPT

## 2021-02-23 PROCEDURE — 87077 CULTURE AEROBIC IDENTIFY: CPT

## 2021-02-23 PROCEDURE — 87491 CHLMYD TRACH DNA AMP PROBE: CPT

## 2021-02-23 PROCEDURE — 80053 COMPREHEN METABOLIC PANEL: CPT

## 2021-02-23 PROCEDURE — 81001 URINALYSIS AUTO W/SCOPE: CPT

## 2021-02-23 RX ORDER — CEPHALEXIN 500 MG/1
500 CAPSULE ORAL 3 TIMES DAILY
Qty: 21 CAP | Refills: 0 | OUTPATIENT
Start: 2021-02-23 | End: 2021-02-23 | Stop reason: SDUPTHER

## 2021-02-23 RX ORDER — CEPHALEXIN 250 MG/1
500 CAPSULE ORAL
Status: COMPLETED | OUTPATIENT
Start: 2021-02-23 | End: 2021-02-23

## 2021-02-23 RX ORDER — CEPHALEXIN 500 MG/1
500 CAPSULE ORAL 3 TIMES DAILY
Qty: 21 CAP | Refills: 0 | Status: SHIPPED | OUTPATIENT
Start: 2021-02-23 | End: 2021-03-02

## 2021-02-23 RX ADMIN — CEPHALEXIN 500 MG: 250 CAPSULE ORAL at 12:37

## 2021-02-23 NOTE — ED TRIAGE NOTES
Patient reports she has been having right sided abdominal pain for the last 5-6 weeks.  Patient reports having a positive home pregnancy test 2 weeks ago- she went to the OB/GYN yesterday who recommended the patient come to the ED yesterday but the patient waited until today-    Pt estimates she is 7-8 weeks pregnant-

## 2021-02-23 NOTE — ED NOTES
10:42 AM   ~7-8w pregnant, pelvic pain R>L, +preg test at [facility] yesterday, no US yet, was referred to ED yesterday to r/o ectopic. I have evaluated the patient as the Provider in Triage. I have reviewed Her vital signs and the triage nurse assessment. I have talked with the patient and any available family and advised that I am the provider in triage and have ordered the appropriate study to initiate their work up based on the clinical presentation during my assessment. I have advised that the patient will be accommodated in the Main ED as soon as possible. I have also requested to contact the triage nurse or myself immediately if the patient experiences any changes in their condition during this brief waiting period.   Sedalia Osler, PA-C

## 2021-02-23 NOTE — ED PROVIDER NOTES
46yo  ~7-8 weeks pregnant presents with c/o pelvic pain x 5-6 weeks, referred to ED yesterday by OB due to pelvic pain and has not yet had an US with this pregnancy. She saw her OB yesterday, had a pelvic exam and was told to come to the ER due to her pelvic pain. She states she did not want to come to the ER last night so decided to come today. She denies fever, chills, vomiting, diarrhea, CP, SOB, vaginal bleeding, discharge or urinary sx's. She has some back pain but states this has been present for weeks as well and not one-sided. No sick contacts, cough, URI sx's, or known COVID exposure. OB: Jayda  [facility]           Past Medical History:   Diagnosis Date    Gastrointestinal disorder     GERD (gastroesophageal reflux disease)     Ill-defined condition     ADD    Psychiatric disorder        Past Surgical History:   Procedure Laterality Date    HX CHOLECYSTECTOMY  2019    Robotic-assisted laparoscopic cholecystectomy with firefly and IOC.  HX GI      gastric sleeve    HX HEENT      wisdom teeth         Family History:   Problem Relation Age of Onset    Diabetes Neg Hx     Hypertension Neg Hx        Social History     Socioeconomic History    Marital status: SINGLE     Spouse name: Not on file    Number of children: Not on file    Years of education: Not on file    Highest education level: Not on file   Occupational History    Not on file   Social Needs    Financial resource strain: Not on file    Food insecurity     Worry: Not on file     Inability: Not on file    Transportation needs     Medical: Not on file     Non-medical: Not on file   Tobacco Use    Smoking status: Former Smoker     Packs/day: 0.25     Years: 3.00     Pack years: 0.75     Quit date: 2014     Years since quittin.8    Smokeless tobacco: Never Used   Substance and Sexual Activity    Alcohol use:  Yes     Alcohol/week: 5.0 standard drinks     Types: 5 Glasses of wine per week    Drug use: Not Currently    Sexual activity: Not on file   Lifestyle    Physical activity     Days per week: Not on file     Minutes per session: Not on file    Stress: Not on file   Relationships    Social connections     Talks on phone: Not on file     Gets together: Not on file     Attends Muslim service: Not on file     Active member of club or organization: Not on file     Attends meetings of clubs or organizations: Not on file     Relationship status: Not on file    Intimate partner violence     Fear of current or ex partner: Not on file     Emotionally abused: Not on file     Physically abused: Not on file     Forced sexual activity: Not on file   Other Topics Concern    Not on file   Social History Narrative    Not on file         ALLERGIES: Patient has no known allergies. Review of Systems   Constitutional: Negative. Negative for activity change, chills, fatigue and unexpected weight change. HENT: Negative for trouble swallowing. Respiratory: Negative for cough, chest tightness, shortness of breath and wheezing. Cardiovascular: Negative. Negative for chest pain and palpitations. Gastrointestinal: Negative. Negative for abdominal pain, diarrhea, nausea and vomiting. Genitourinary: Positive for pelvic pain. Negative for dysuria, flank pain, frequency, hematuria, vaginal bleeding, vaginal discharge and vaginal pain. Musculoskeletal: Negative. Negative for arthralgias, back pain, neck pain and neck stiffness. Skin: Negative. Negative for color change and rash. Neurological: Negative. Negative for dizziness, numbness and headaches. All other systems reviewed and are negative. Vitals:    02/23/21 1038   BP: 115/75   Pulse: 82   Resp: 16   Temp: 97.7 °F (36.5 °C)   SpO2: 98%   Weight: 72.1 kg (159 lb)   Height: 5' 8\" (1.727 m)            Physical Exam  Vitals signs and nursing note reviewed. Constitutional:       General: She is not in acute distress.      Appearance: She is well-developed. She is not toxic-appearing or diaphoretic. Comments: WF   HENT:      Head: Normocephalic and atraumatic. Eyes:      General:         Right eye: No discharge. Left eye: No discharge. Conjunctiva/sclera: Conjunctivae normal.      Pupils: Pupils are equal, round, and reactive to light. Neck:      Musculoskeletal: Full passive range of motion without pain and normal range of motion. Trachea: No tracheal tenderness. Cardiovascular:      Rate and Rhythm: Normal rate and regular rhythm. Pulses: Normal pulses. Heart sounds: Normal heart sounds. No murmur. No friction rub. No gallop. Pulmonary:      Effort: Pulmonary effort is normal. No respiratory distress. Breath sounds: Normal breath sounds. No wheezing or rales. Chest:      Chest wall: No tenderness. Abdominal:      General: Bowel sounds are normal. There is no distension. Palpations: Abdomen is soft. Tenderness: There is no abdominal tenderness. There is no guarding or rebound. Musculoskeletal: Normal range of motion. General: No tenderness. Skin:     General: Skin is warm and dry. Capillary Refill: Capillary refill takes less than 2 seconds. Findings: No abrasion, erythema or rash. Neurological:      Mental Status: She is alert and oriented to person, place, and time. Cranial Nerves: No cranial nerve deficit. Sensory: No sensory deficit.       Coordination: Coordination normal.   Psychiatric:         Speech: Speech normal.         Behavior: Behavior normal.          MDM  Number of Diagnoses or Management Options  Urinary tract infection in mother during first trimester of pregnancy  Diagnosis management comments:   Ddx: ectopic pregnancy, threatened miscarriage, UTI       Amount and/or Complexity of Data Reviewed  Clinical lab tests: reviewed and ordered  Tests in the radiology section of CPT®: ordered and reviewed  Review and summarize past medical records: yes  Discuss the patient with other providers: yes    Patient Progress  Patient progress: stable         Procedures    Had pelvic exam yesterday with her OB, no new sx's today, denies vaginal bleeding or abnormal discharge so will not repeat. Jayda Del Valle PA-C      LABORATORY TESTS:  Recent Results (from the past 12 hour(s))   SAMPLES BEING HELD    Collection Time: 02/23/21 11:29 AM   Result Value Ref Range    SAMPLES BEING HELD 1RD,1BL     COMMENT        Add-on orders for these samples will be processed based on acceptable specimen integrity and analyte stability, which may vary by analyte. CBC WITH AUTOMATED DIFF    Collection Time: 02/23/21 11:29 AM   Result Value Ref Range    WBC 6.2 3.6 - 11.0 K/uL    RBC 3.37 (L) 3.80 - 5.20 M/uL    HGB 10.5 (L) 11.5 - 16.0 g/dL    HCT 31.7 (L) 35.0 - 47.0 %    MCV 94.1 80.0 - 99.0 FL    MCH 31.2 26.0 - 34.0 PG    MCHC 33.1 30.0 - 36.5 g/dL    RDW 14.0 11.5 - 14.5 %    PLATELET 939 079 - 572 K/uL    MPV 9.9 8.9 - 12.9 FL    NRBC 0.0 0  WBC    ABSOLUTE NRBC 0.00 0.00 - 0.01 K/uL    NEUTROPHILS 64 32 - 75 %    LYMPHOCYTES 21 12 - 49 %    MONOCYTES 11 5 - 13 %    EOSINOPHILS 2 0 - 7 %    BASOPHILS 1 0 - 1 %    IMMATURE GRANULOCYTES 1 (H) 0.0 - 0.5 %    ABS. NEUTROPHILS 4.1 1.8 - 8.0 K/UL    ABS. LYMPHOCYTES 1.3 0.8 - 3.5 K/UL    ABS. MONOCYTES 0.7 0.0 - 1.0 K/UL    ABS. EOSINOPHILS 0.1 0.0 - 0.4 K/UL    ABS. BASOPHILS 0.0 0.0 - 0.1 K/UL    ABS. IMM.  GRANS. 0.0 0.00 - 0.04 K/UL    DF AUTOMATED     METABOLIC PANEL, COMPREHENSIVE    Collection Time: 02/23/21 11:29 AM   Result Value Ref Range    Sodium 136 136 - 145 mmol/L    Potassium 4.3 3.5 - 5.1 mmol/L    Chloride 105 97 - 108 mmol/L    CO2 26 21 - 32 mmol/L    Anion gap 5 5 - 15 mmol/L    Glucose 75 65 - 100 mg/dL    BUN 8 6 - 20 MG/DL    Creatinine 0.68 0.55 - 1.02 MG/DL    BUN/Creatinine ratio 12 12 - 20      GFR est AA >60 >60 ml/min/1.73m2    GFR est non-AA >60 >60 ml/min/1.73m2    Calcium 8.7 8.5 - 10.1 MG/DL Bilirubin, total 0.4 0.2 - 1.0 MG/DL    ALT (SGPT) 15 12 - 78 U/L    AST (SGOT) 12 (L) 15 - 37 U/L    Alk. phosphatase 43 (L) 45 - 117 U/L    Protein, total 7.1 6.4 - 8.2 g/dL    Albumin 3.1 (L) 3.5 - 5.0 g/dL    Globulin 4.0 2.0 - 4.0 g/dL    A-G Ratio 0.8 (L) 1.1 - 2.2     LIPASE    Collection Time: 02/23/21 11:29 AM   Result Value Ref Range    Lipase 75 73 - 393 U/L   BETA HCG, QT    Collection Time: 02/23/21 11:29 AM   Result Value Ref Range    Beta HCG, ,967 (H) 0 - 6 MIU/ML   BLOOD TYPE, (ABO+RH)    Collection Time: 02/23/21 11:29 AM   Result Value Ref Range    ABO/Rh(D) O POSITIVE     Comment SAMPLE NOT USABLE FOR CROSSMATCH    URINALYSIS W/MICROSCOPIC    Collection Time: 02/23/21 11:29 AM   Result Value Ref Range    Color YELLOW/STRAW      Appearance CLOUDY (A) CLEAR      Specific gravity 1.023 1.003 - 1.030      pH (UA) 6.0 5.0 - 8.0      Protein Negative NEG mg/dL    Glucose Negative NEG mg/dL    Ketone TRACE (A) NEG mg/dL    Bilirubin Negative NEG      Blood TRACE (A) NEG      Urobilinogen 0.2 0.2 - 1.0 EU/dL    Nitrites Positive (A) NEG      Leukocyte Esterase TRACE (A) NEG      WBC 5-10 0 - 4 /hpf    RBC 0-5 0 - 5 /hpf    Epithelial cells MODERATE (A) FEW /lpf    Bacteria 4+ (A) NEG /hpf    Hyaline cast 2-5 0 - 5 /lpf   URINE CULTURE HOLD SAMPLE    Collection Time: 02/23/21 11:29 AM    Specimen: Serum; Urine   Result Value Ref Range    Urine culture hold        Urine on hold in Microbiology dept for 2 days. If unpreserved urine is submitted, it cannot be used for addtional testing after 24 hours, recollection will be required. IMAGING RESULTS:  Us Uts Transvaginal Ob    Result Date: 2/23/2021  Single viable intrauterine pregnancy with estimated gestational age of 10 weeks 1 day. Small slightly hyperechoic nodule in the right ovary may represent a corpus luteum.          MEDICATIONS GIVEN:  Medications   cephALEXin (KEFLEX) capsule 500 mg (500 mg Oral Given 2/23/21 1237)         DISCHARGE NOTE:  The patient's results have been reviewed with them and/or available family. Patient and/or family verbally conveyed their understanding and agreement of the patient's signs, symptoms, diagnosis, treatment and prognosis and additionally agree to follow up as recommended in the discharge instructions or to return to the Emergency Room should their condition change prior to their follow-up appointment. The patient/family verbally agrees with the care-plan and verbally conveys that all of their questions have been answered. The discharge instructions have also been provided to the patient and/or family with some educational information regarding the patient's diagnosis as well a list of reasons why the patient would want to return to the ER prior to their follow-up appointment, should their condition change. Plan:  1. F/U with OB  2. Rx Keflex; can take Tylenol  3.  Return precautions discussed and advised to return to ER if worse

## 2021-02-24 LAB
C TRACH DNA SPEC QL NAA+PROBE: NEGATIVE
N GONORRHOEA DNA SPEC QL NAA+PROBE: NEGATIVE
SAMPLE TYPE: NORMAL
SERVICE CMNT-IMP: NORMAL
SPECIMEN SOURCE: NORMAL

## 2021-02-25 LAB
BACTERIA SPEC CULT: ABNORMAL
CC UR VC: ABNORMAL
SERVICE CMNT-IMP: ABNORMAL

## 2021-03-04 ENCOUNTER — INITIAL PRENATAL (OUTPATIENT)
Dept: OBGYN CLINIC | Age: 46
End: 2021-03-04
Payer: COMMERCIAL

## 2021-03-04 VITALS
DIASTOLIC BLOOD PRESSURE: 80 MMHG | TEMPERATURE: 96.5 F | OXYGEN SATURATION: 99 % | BODY MASS INDEX: 24.96 KG/M2 | RESPIRATION RATE: 18 BRPM | SYSTOLIC BLOOD PRESSURE: 128 MMHG | HEART RATE: 86 BPM | HEIGHT: 67 IN | WEIGHT: 159 LBS

## 2021-03-04 DIAGNOSIS — Z01.419 ROUTINE GYNECOLOGICAL EXAMINATION: ICD-10-CM

## 2021-03-04 DIAGNOSIS — Z01.419 GYNECOLOGIC EXAM NORMAL: ICD-10-CM

## 2021-03-04 DIAGNOSIS — O09.529 ANTEPARTUM MULTIGRAVIDA OF ADVANCED MATERNAL AGE: Primary | ICD-10-CM

## 2021-03-04 DIAGNOSIS — O36.80X0 PREGNANCY WITH UNCERTAIN FETAL VIABILITY, SINGLE OR UNSPECIFIED FETUS: ICD-10-CM

## 2021-03-04 DIAGNOSIS — Z3A.10 10 WEEKS GESTATION OF PREGNANCY: ICD-10-CM

## 2021-03-04 PROCEDURE — 0500F INITIAL PRENATAL CARE VISIT: CPT | Performed by: OBSTETRICS & GYNECOLOGY

## 2021-03-04 PROCEDURE — 76801 OB US < 14 WKS SINGLE FETUS: CPT | Performed by: OBSTETRICS & GYNECOLOGY

## 2021-03-04 RX ORDER — METOCLOPRAMIDE 10 MG/1
10 TABLET ORAL
Qty: 120 TAB | Refills: 3 | Status: SHIPPED | OUTPATIENT
Start: 2021-03-04 | End: 2021-04-03

## 2021-03-04 RX ORDER — TRAZODONE HYDROCHLORIDE 100 MG/1
100 TABLET ORAL
COMMUNITY

## 2021-03-04 RX ORDER — DESVENLAFAXINE 100 MG/1
1 TABLET, EXTENDED RELEASE ORAL DAILY
COMMUNITY

## 2021-03-04 RX ORDER — LANOLIN ALCOHOL/MO/W.PET/CERES
400 CREAM (GRAM) TOPICAL DAILY
Qty: 60 TAB | Refills: 1 | Status: SHIPPED
Start: 2021-03-04 | End: 2021-09-24

## 2021-03-04 NOTE — PROGRESS NOTES
Deana Foy is a 39 y.o. female, , No LMP recorded (lmp unknown). Patient is pregnant. , who presents today for the following:  Chief Complaint   Patient presents with    Initial Prenatal Visit        No Known Allergies    Current Outpatient Medications   Medication Sig    Desvenlafaxine (Pristiq) 100 mg Tb24 Take 150 Tabs by mouth daily.  traZODone (DESYREL) 100 mg tablet Take 100 mg by mouth nightly.  metoclopramide HCl (REGLAN) 10 mg tablet Take 1 Tab by mouth Before breakfast, lunch, dinner and at bedtime for 30 days.  prenatal vit-iron fumarate-fa 27 mg iron- 0.8 mg tab tablet Take 1 Tab by mouth daily.  acetaminophen (TYLENOL) 325 mg tablet Take 2 Tabs by mouth every four (4) hours as needed for Pain or Fever. (Patient taking differently: Take 650 mg by mouth DIALYSIS PRN for Pain or Fever.)    FLUoxetine (PROZAC) 10 mg capsule Take 10 mg by mouth nightly.  dextroamphetamine-amphetamine (ADDERALL) 10 mg tablet Take  by mouth four (4) times daily.  omeprazole (PRILOSEC) 10 mg capsule Take 40 mg by mouth daily. No current facility-administered medications for this visit. Past Medical History:   Diagnosis Date    ADHD     Asthma     Depression     Gastrointestinal disorder     GERD (gastroesophageal reflux disease)     Ill-defined condition     ADD    Psychiatric disorder        Past Surgical History:   Procedure Laterality Date    HX CHOLECYSTECTOMY  2019    Robotic-assisted laparoscopic cholecystectomy with firefly and IOC.     HX GI      gastric sleeve    HX HEENT      wisdom teeth       Family History   Problem Relation Age of Onset    Diabetes Neg Hx     Hypertension Neg Hx        Social History     Socioeconomic History    Marital status: SINGLE     Spouse name: Not on file    Number of children: Not on file    Years of education: Not on file    Highest education level: Not on file   Occupational History    Not on file   Social Needs    Financial resource strain: Not on file    Food insecurity     Worry: Not on file     Inability: Not on file    Transportation needs     Medical: Not on file     Non-medical: Not on file   Tobacco Use    Smoking status: Former Smoker     Packs/day: 0.25     Years: 3.00     Pack years: 0.75     Quit date: 2014     Years since quittin.9    Smokeless tobacco: Never Used   Substance and Sexual Activity    Alcohol use: Not Currently     Alcohol/week: 5.0 standard drinks     Types: 5 Glasses of wine per week    Drug use: Not Currently    Sexual activity: Yes     Partners: Male     Birth control/protection: None   Lifestyle    Physical activity     Days per week: Not on file     Minutes per session: Not on file    Stress: Not on file   Relationships    Social connections     Talks on phone: Not on file     Gets together: Not on file     Attends Hinduism service: Not on file     Active member of club or organization: Not on file     Attends meetings of clubs or organizations: Not on file     Relationship status: Not on file    Intimate partner violence     Fear of current or ex partner: Not on file     Emotionally abused: Not on file     Physically abused: Not on file     Forced sexual activity: Not on file   Other Topics Concern    Not on file   Social History Narrative    Not on file         HPI  New OB visit  Pregnancy dx and confirmed at [facility]  Doing well  Reports headaches and nausea  No pain   No bleeding  History of gastric sleeve    Review of Systems   Constitutional: Negative. Respiratory: Negative. Cardiovascular: Negative. Gastrointestinal: Negative. Genitourinary: Negative. Musculoskeletal: Negative. Skin: Negative. Neurological: Negative. Endo/Heme/Allergies: Negative. Psychiatric/Behavioral: Negative. All other systems reviewed and are negative.          /80   Pulse 86   Temp (!) 96.5 °F (35.8 °C)   Resp 18   Ht 5' 7\" (1.702 m)   Wt 159 lb (72.1 kg)   LMP  (LMP Unknown)   SpO2 99%   BMI 24.90 kg/m²    OBGyn Exam   PE:  Constitutional: General Appearance: healthy-appearing, well-nourished, well-developed, and well groomed. Psychiatric: Orientation: to time, place, and person. Mood and Affect: normal mood and affect and appropriate and active and alert. Abdomen: Auscultation/Inspection/Palpation: tenderness and mass and non-distended. Hernia: none palpated. Female Genitalia: Vulva: no masses, atrophy, or lesions. Bladder/Urethra: no urethral discharge or mass and normal meatus and bladder non distended. Vagina no tenderness, erythema, cystocele, rectocele, abnormal vaginal discharge, or vesicle(s) or ulcers. Cervix: no discharge or cervical motion tenderness and grossly normal.     Uterus: mobile, non-tender, and no uterine prolapse. Adnexa/Parametria: no adnexal tenderness. 1. Antepartum multigravida of advanced maternal age    - HEP B SURFACE AG  - T PALLIDUM SCREEN W/REFLEX  - RUBELLA AB, IGG  - TYPE & SCREEN  - HIV 1/2 AG/AB, 4TH GENERATION,W RFLX CONFIRM  - CBC WITH AUTOMATED DIFF  - HEMOGLOBIN FRACTIONATION  - THYROID CASCADE PROFILE  - CMV AB, IGG  - CMV AB, IGM  - CYSTIC FIBROSIS MUTATION 97  - VITAMIN D, 25 HYDROXY  - metoclopramide HCl (REGLAN) 10 mg tablet; Take 1 Tab by mouth Before breakfast, lunch, dinner and at bedtime for 30 days. Dispense: 120 Tab; Refill: 3  - prenatal vit-iron fumarate-fa 27 mg iron- 0.8 mg tab tablet; Take 1 Tab by mouth daily. Dispense: 90 Tab; Refill: 3  - [id number] PLUS CORE (CHR21,18,13,SEX)    2. 10 weeks gestation of pregnancy      3. Routine gynecological examination    - PAP IG, CT-NG-TV, RFX APTIMA HPV ASCUS ([id number])    4. Pregnancy with uncertain fetal viability, single or unspecified fetus    - AMB POC US OB < 14 WKS, 1ST GESTATION    5.  Gynecologic exam normal    - PAP IG, CT-NG, RFX APTIMA HPV ASCUS ([id number], [id number])        Follow-up and Dispositions    · Return in about 4 weeks (around 4/1/2021) for ob.

## 2021-03-04 NOTE — PROGRESS NOTES
1. Have you been to the ER, urgent care clinic since your last visit? Hospitalized since your last visit? Yes [facility]    2. Have you seen or consulted any other health care providers outside of the [address] [place] [address] since your last visit? Include any pap smears or colon screening.  No    Visit Vitals  /80   Pulse 86   Temp (!) 96.5 °F (35.8 °C)   Resp 18   Ht 5' 7\" (1.702 m)   Wt 159 lb (72.1 kg)   LMP  (LMP Unknown)   SpO2 99%   BMI 24.90 kg/m²

## 2021-03-04 NOTE — PATIENT INSTRUCTIONS
Advanced Maternal Age: Care Instructions  Your Care Instructions     Advanced maternal age is the medical term for pregnancy in a woman who will be 28 or older on her due date. Most women this age have healthy babies. But a pregnancy at this age has a greater risk for problems than a pregnancy at a younger age. These include  birth and preeclampsia. They also include gestational diabetes, problems with the placenta, and genetic problems. Most of these problems can't be prevented. But it's best to catch any problems early. This is why your doctor will want to check you for diabetes. He or she will also check your blood pressure and urine at every visit. High blood pressure and protein in urine are signs of preeclampsia. You can decide if you want to have tests to find out if your fetus has certain genetic problems, such as Down syndrome. A fetus is the medical term for a baby before birth. There are many things you can't control about pregnancy. But there is a lot you can do to help you have a healthy pregnancy. Do your best to eat well. And try to get plenty of exercise and rest.  Follow-up care is a key part of your treatment and safety. Be sure to make and go to all appointments, and call your doctor if you are having problems. It's also a good idea to know your test results and keep a list of the medicines you take. How can you care for yourself at home? · Talk with your doctor about prenatal screening tests. These can help find Down syndrome and other possible problems. · Eat a balanced diet with plenty of protein, calcium, and iron. Be sure to include fruits, vegetables, and whole grains. · Talk to your doctor about an exercise plan. Many pregnant women enjoy walking, swimming, and prenatal yoga. · Make sure that you get enough folic acid. Folic acid helps prevent some birth defects, especially if you take it before you get pregnant. Your doctor will tell you how much you need.  You can take folic acid pills. · Take your prenatal vitamins. · Drink plenty of fluids, enough so that your urine is light yellow or clear like water. Dehydration can lead to contractions. If you have kidney, heart, or liver disease and have to limit fluids, talk with your doctor before you increase the amount of fluids you drink. · Check with your doctor or pharmacist before you take any over-the-counter medicines, vitamins, herbal supplements, or home remedies. · Do not drink alcohol. No amount of alcohol has been found to be safe during pregnancy. · Do not smoke. If you need help quitting, talk to your doctor about stop-smoking programs and medicines. These can increase your chances of quitting for good. When should you call for help? Watch closely for changes in your health, and be sure to contact your doctor if you have any problems. Where can you learn more? Go to http://www.gray.com/  Enter C333 in the search box to learn more about \"Advanced Maternal Age: Care Instructions. \"  Current as of: February 11, 2020               Content Version: 12.6  © 6883-3515 [employer]. Care instructions adapted under license by [employer] (which disclaims liability or warranty for this information). If you have questions about a medical condition or this instruction, always ask your healthcare professional. [address] any warranty or liability for your use of this information. Nutrition During Pregnancy: Care Instructions  Your Care Instructions     Healthy eating when you are pregnant is important for you and your baby. It can help you feel well and have a successful pregnancy and delivery. During pregnancy your nutrition needs increase. Even if you have excellent eating habits, your doctor may recommend a multivitamin to make sure you get enough iron and folic acid. Many pregnant women wonder how much weight they should gain.  In general, women who were at a healthy weight before they became pregnant should gain between 25 and 35 pounds. Women who were overweight before pregnancy are usually advised to gain 15 to 25 pounds. Women who were underweight before pregnancy are usually advised to gain 28 to 40 pounds. Your doctor will work with you to set a weight goal that is right for you. Gaining a healthy amount of weight helps you have a healthy baby. Follow-up care is a key part of your treatment and safety. Be sure to make and go to all appointments, and call your doctor if you are having problems. It's also a good idea to know your test results and keep a list of the medicines you take. How can you care for yourself at home? · Eat plenty of fruits and vegetables. Include a variety of orange, yellow, and leafy dark-green vegetables every day. · Choose whole-grain bread, cereal, and pasta. Good choices include whole wheat bread, whole wheat pasta, brown rice, and oatmeal.  · Get 4 or more servings of milk and milk products each day. Good choices include nonfat or low-fat milk, yogurt, and cheese. If you cannot eat milk products, you can get calcium from calcium-fortified products such as orange juice, soy milk, and tofu. Other non-milk sources of calcium include leafy green vegetables, such as broccoli, kale, mustard greens, turnip greens, bok oni, and brussels sprouts. · If you eat meat, pick lower-fat types. Good choices include lean cuts of meat and chicken or turkey without the skin. · Do not eat shark, swordfish, marielena mackerel, or tilefish. They have high levels of mercury, which is dangerous to your baby. You can eat up to 12 ounces a week of fish or shellfish that have low mercury levels. Good choices include shrimp, wild salmon, pollock, and catfish. Do not eat more than 6 ounces of tuna each week. · Heat lunch meats (such as turkey, ham, or bologna) to 165°F before you eat them.  This reduces your risk of getting sick from a kind of bacteria that can be found in lunch meats. · Do not eat unpasteurized soft cheeses, such as brie, feta, fresh mozzarella, and blue cheese. They have a bacteria that could harm your baby. · Limit caffeine. If you drink coffee or tea, have no more than 1 cup a day. Caffeine is also found in aftab. · Do not drink any alcohol. No amount of alcohol has been found to be safe during pregnancy. · Do not diet or try to lose weight. For example, do not follow a low-carbohydrate diet. If you are overweight at the start of your pregnancy, your doctor will work with you to manage your weight gain. · Tell your doctor about all vitamins and supplements you take. When should you call for help? Watch closely for changes in your health, and be sure to contact your doctor if you have any problems. Where can you learn more? Go to http://www.gray.com/  Enter Y785 in the search box to learn more about \"Nutrition During Pregnancy: Care Instructions. \"  Current as of: February 11, 2020               Content Version: 12.6  © 1049-9913 [employer]. Care instructions adapted under license by [employer] (which disclaims liability or warranty for this information). If you have questions about a medical condition or this instruction, always ask your healthcare professional. [address] any warranty or liability for your use of this information. Managing Morning Sickness: Care Instructions  Overview     Morning sickness can be the toughest part of early pregnancy. Some people feel mildly sick to their stomach, and others are running to the bathroom. The good news? Morning sickness usually gets better in the second trimester. It's likely that your hormones are to blame for morning sickness. But you can do things to feel better, like changing what you eat, avoiding certain foods and smells, and asking your doctor about medicines you can try.   Follow-up care is a key part of your treatment and safety. Be sure to make and go to all appointments, and call your doctor if you are having problems. It's also a good idea to know your test results and keep a list of the medicines you take. How can you care for yourself at home? · Keep food in your stomach, but not too much at once. Your nausea may be worse if your stomach is empty. Eat five or six small meals a day instead of three large meals. · For morning nausea, eat a small snack, such as a couple of crackers or dry biscuits, before rising. Allow a few minutes for your stomach to settle before you get out of bed slowly. · Drink plenty of fluids, enough so that your urine is light yellow or clear like water. If you have kidney, heart, or liver disease and have to limit fluids, talk with your doctor before you increase the amount of fluids you drink. Some women find that peppermint tea helps with nausea. · Eat more protein, such as chicken, fish, lean meat, beans, nuts, and seeds. · Eat carbohydrate foods, such as potatoes, whole-grain cereals, rice, and pasta. · Avoid smells and foods that make you feel nauseated. Spicy or high-fat foods, citrus juice, milk, coffee, and tea with caffeine often make nausea worse. · Do not drink alcohol. · Do not smoke. Try not to be around others who smoke. If you need help quitting, talk to your doctor about stop-smoking programs and medicines. These can increase your chances of quitting for good. · If you are taking iron supplements, ask your doctor if they are necessary. Iron can make nausea worse. · Get lots of rest. Stress and fatigue can make your morning sickness worse. · Ask your doctor about taking prescription medicine, or over-the-counter products such as vitamin B6, doxylamine, or mat, to relieve your symptoms. Your doctor can tell you the doses that are safe for you. · Take your prenatal vitamins at night on a full stomach. When should you call for help?    Call 46 anytime you think you may need emergency care. For example, call if:    · You passed out (lost consciousness). Call your doctor now or seek immediate medical care if:    · You are sick to your stomach or cannot drink fluids.     · You have symptoms of dehydration, such as:  ? Dry eyes and a dry mouth. ? Passing only a little urine. ? Feeling thirstier than usual.     · You are not able to keep down your medicine.     · You have pain in your belly or pelvis. Watch closely for changes in your health, and be sure to contact your doctor if:    · You do not get better as expected. Where can you learn more? Go to http://www.gray.com/  Enter W450 in the search box to learn more about \"Managing Morning Sickness: Care Instructions. \"  Current as of: February 11, 2020               Content Version: 12.6  © 4424-8358 [employer]. Care instructions adapted under license by [employer] (which disclaims liability or warranty for this information). If you have questions about a medical condition or this instruction, always ask your healthcare professional. [address] any warranty or liability for your use of this information. Learning About Pregnancy  Your Care Instructions     Your health in the early weeks of your pregnancy is particularly important for your baby's health. Take good care of yourself. Anything you do that harms your body can also harm your baby. Make sure to go to all of your doctor appointments. Regular checkups will help keep you and your baby healthy. How can you care for yourself at home? Diet    · Eat a balanced diet. Make sure your diet includes plenty of beans, peas, and leafy green vegetables.     · Do not skip meals or go for many hours without eating.  If you are nauseated, try to eat a small, healthy snack every 2 to 3 hours.     · Do not eat fish that has a high level of mercury, such as shark, swordfish, or mackerel. Do not eat more than one can of tuna each week.     · Drink plenty of fluids, enough so that your urine is light yellow or clear like water. If you have kidney, heart, or liver disease and have to limit fluids, talk with your doctor before you increase the amount of fluids you drink.     · Cut down on caffeine, such as coffee, tea, and cola.     · Do not drink alcohol, such as beer, wine, or hard liquor.     · Take a multivitamin that contains at least 400 micrograms (mcg) of folic acid to help prevent birth defects. Fortified cereal and whole wheat bread are good additional sources of folic acid.     · Increase the calcium in your diet. Try to drink a quart of skim milk each day. You may also take calcium supplements and choose foods such as cheese and yogurt. Lifestyle    · Make sure you go to your follow-up appointments.     · Get plenty of rest. You may be unusually tired while you are pregnant.     · Get at least 30 minutes of exercise on most days of the week. Walking is a good choice. If you have not exercised in the past, start out slowly. Take several short walks each day.     · Do not smoke. If you need help quitting, talk to your doctor about stop-smoking programs. These can increase your chances of quitting for good.     · Do not touch cat feces or litter boxes. Also, wash your hands after you handle raw meat, and fully cook all meat before you eat it. Wear gloves when you work in the yard or garden, and wash your hands well when you are done. Cat feces, raw or undercooked meat, and contaminated dirt can cause an infection that may harm your baby or lead to a miscarriage.     · Do not use saunas or hot tubs. Raising your body temperature may harm your baby.     · Avoid chemical fumes, paint fumes, or poisons.     · Do not use illegal drugs or alcohol. Medicines    · Review all of your medicines with your doctor.  Some of your routine medicines may need to be changed to protect your baby.     · Use acetaminophen (Tylenol) to relieve minor problems, such as a mild headache or backache or a mild fever with cold symptoms. Do not use nonsteroidal anti-inflammatory drugs (NSAIDs), such as ibuprofen (Advil, Motrin) or naproxen (Aleve), unless your doctor says it is okay.     · Do not take two or more pain medicines at the same time unless the doctor told you to. Many pain medicines have acetaminophen, which is Tylenol. Too much acetaminophen (Tylenol) can be harmful.     · Take your medicines exactly as prescribed. Call your doctor if you think you are having a problem with your medicine. To manage morning sickness    · If you feel sick when you first wake up, try eating a small snack (such as crackers) before you get out of bed. Allow some time to digest the snack, and then get out of bed slowly.     · Do not skip meals or go for long periods without eating. An empty stomach can make nausea worse.     · Eat small, frequent meals instead of three large meals each day.     · Drink plenty of fluids. Sports drinks, such as Gatorade or Powerade, are good choices.     · Eat foods that are high in protein but low in fat.     · If you are taking iron supplements, ask your doctor if they are necessary. Iron can make nausea worse.     · Avoid any smells, such as coffee, that make you feel sick.     · Get lots of rest. Morning sickness may be worse when you are tired. Follow-up care is a key part of your treatment and safety. Be sure to make and go to all appointments, and call your doctor if you are having problems. It's also a good idea to know your test results and keep a list of the medicines you take. Where can you learn more? Go to http://www.gray.com/  Enter Z223 in the search box to learn more about \"Learning About Pregnancy. \"  Current as of: February 11, 2020               Content Version: 12.6  © 4529-6682 [employer].    Care instructions adapted under license by [employer] (which disclaims liability or warranty for this information). If you have questions about a medical condition or this instruction, always ask your healthcare professional. [employer] disclaims any warranty or liability for your use of this information.

## 2021-03-08 LAB
ABOUT THE TEST: NORMAL
FET TS 13 RISK PLAS.CFDNA QL: NEGATIVE
FETAL FRACTION: NORMAL
FETAL SEX: NORMAL
GA EST FROM CONCEPTION DATE: NORMAL D
GESTATIONAL AGE >=9W: YES
LAB DIRECTOR NAME PROVIDER: NORMAL
LAB DIRECTOR NAME PROVIDER: NORMAL
LIMITATIONS OF THE TEST: NORMAL
NEGATIVE PREDICTIVE VALUE: NORMAL
NOTE: NORMAL
PERFORMANCE CHARACTERISTICS: NORMAL
POSITIVE PREDICTIVE VALUE: NORMAL
REF LAB TEST METHOD: NORMAL
REFERENCES: NORMAL
RESULT, 451942: NEGATIVE
TEST PERFORMANCE INFO SPEC: NORMAL
TRISOMY 18 (EDWARDS SYNDROME): NEGATIVE
TRISOMY 21 (DOWN SYNDROME): NEGATIVE

## 2021-03-09 LAB
25(OH)D3+25(OH)D2 SERPL-MCNC: 16.3 NG/ML (ref 30–100)
ABO GROUP BLD: NORMAL
BASOPHILS # BLD AUTO: 0 X10E3/UL (ref 0–0.2)
BASOPHILS NFR BLD AUTO: 0 %
BLD GP AB SCN SERPL QL: NEGATIVE
C TRACH RRNA CVX QL NAA+PROBE: NEGATIVE
CFTR MUT ANL BLD/T: NORMAL
CMV IGG SERPL IA-ACNC: <0.6 U/ML (ref 0–0.59)
CMV IGM SERPL IA-ACNC: <30 AU/ML (ref 0–29.9)
CYTOLOGIST CVX/VAG CYTO: ABNORMAL
CYTOLOGY CVX/VAG DOC CYTO: ABNORMAL
CYTOLOGY CVX/VAG DOC THIN PREP: ABNORMAL
DX ICD CODE: ABNORMAL
DX ICD CODE: ABNORMAL
EOSINOPHIL # BLD AUTO: 0 X10E3/UL (ref 0–0.4)
EOSINOPHIL NFR BLD AUTO: 0 %
ERYTHROCYTE [DISTWIDTH] IN BLOOD BY AUTOMATED COUNT: 13.4 % (ref 11.7–15.4)
GENE DIS ANL CARRIER INTERP-IMP: NORMAL
HBV SURFACE AG SERPL QL IA: NEGATIVE
HCT VFR BLD AUTO: 33.7 % (ref 34–46.6)
HGB A MFR BLD ELPH: 97.4 % (ref 96.4–98.8)
HGB A2 MFR BLD ELPH: 2.6 % (ref 1.8–3.2)
HGB BLD-MCNC: 11.6 G/DL (ref 11.1–15.9)
HGB F MFR BLD ELPH: 0 % (ref 0–2)
HGB FRACT BLD-IMP: NORMAL
HGB S MFR BLD ELPH: 0 %
HIV 1+2 AB+HIV1 P24 AG SERPL QL IA: NON REACTIVE
IMM GRANULOCYTES # BLD AUTO: 0.1 X10E3/UL (ref 0–0.1)
IMM GRANULOCYTES NFR BLD AUTO: 1 %
LABCORP, 190119: ABNORMAL
LYMPHOCYTES # BLD AUTO: 1.8 X10E3/UL (ref 0.7–3.1)
LYMPHOCYTES NFR BLD AUTO: 17 %
Lab: ABNORMAL
MCH RBC QN AUTO: 31.5 PG (ref 26.6–33)
MCHC RBC AUTO-ENTMCNC: 34.4 G/DL (ref 31.5–35.7)
MCV RBC AUTO: 92 FL (ref 79–97)
MONOCYTES # BLD AUTO: 0.7 X10E3/UL (ref 0.1–0.9)
MONOCYTES NFR BLD AUTO: 7 %
N GONORRHOEA RRNA CVX QL NAA+PROBE: NEGATIVE
NEUTROPHILS # BLD AUTO: 8.2 X10E3/UL (ref 1.4–7)
NEUTROPHILS NFR BLD AUTO: 75 %
OTHER STN SPEC: ABNORMAL
PATHOLOGIST CVX/VAG CYTO: ABNORMAL
PLATELET # BLD AUTO: 288 X10E3/UL (ref 150–450)
RBC # BLD AUTO: 3.68 X10E6/UL (ref 3.77–5.28)
RH BLD: POSITIVE
RUBV IGG SERPL IA-ACNC: 7.88 INDEX
STAT OF ADQ CVX/VAG CYTO-IMP: ABNORMAL
T VAGINALIS RRNA SPEC QL NAA+PROBE: NEGATIVE
TREPONEMA PALLIDUM IGG+IGM AB [PRESENCE] IN SERUM OR PLASMA BY IMMUNOASSAY: NON REACTIVE
TSH SERPL DL<=0.005 MIU/L-ACNC: 1.43 UIU/ML (ref 0.45–4.5)
WBC # BLD AUTO: 10.9 X10E3/UL (ref 3.4–10.8)

## 2021-03-12 ENCOUNTER — TELEPHONE (OUTPATIENT)
Dept: OBGYN CLINIC | Age: 46
End: 2021-03-12

## 2021-03-12 DIAGNOSIS — G43.909 MIGRAINE WITHOUT STATUS MIGRAINOSUS, NOT INTRACTABLE, UNSPECIFIED MIGRAINE TYPE: Primary | ICD-10-CM

## 2021-03-12 RX ORDER — BUTALBITAL, ACETAMINOPHEN AND CAFFEINE 50; 325; 40 MG/1; MG/1; MG/1
1 TABLET ORAL
Qty: 30 TAB | Refills: 1 | Status: SHIPPED
Start: 2021-03-12 | End: 2021-09-24

## 2021-03-12 NOTE — TELEPHONE ENCOUNTER
Received a call from [facility] the patient's boyfriend stating she has been in bed for the last few days with a migraine. He stated she has been to the E/R for the headache. Advised him I need to speak with the patient as he is not on her auth release form. Spoke with Romy and she relayed the same information. Per Dr Marissa Garcia, prescription for Fioricet sent to patient's pharmacy.

## 2021-04-02 ENCOUNTER — ROUTINE PRENATAL (OUTPATIENT)
Dept: OBGYN CLINIC | Age: 46
End: 2021-04-02
Payer: COMMERCIAL

## 2021-04-02 VITALS — WEIGHT: 163 LBS | DIASTOLIC BLOOD PRESSURE: 96 MMHG | BODY MASS INDEX: 25.53 KG/M2 | SYSTOLIC BLOOD PRESSURE: 148 MMHG

## 2021-04-02 DIAGNOSIS — O09.529 ANTEPARTUM MULTIGRAVIDA OF ADVANCED MATERNAL AGE: Primary | ICD-10-CM

## 2021-04-02 DIAGNOSIS — Z36.9 UNSPECIFIED ANTENATAL SCREENING: ICD-10-CM

## 2021-04-02 DIAGNOSIS — O16.9 ELEVATED BLOOD PRESSURE AFFECTING PREGNANCY, ANTEPARTUM: ICD-10-CM

## 2021-04-02 DIAGNOSIS — Z3A.14 14 WEEKS GESTATION OF PREGNANCY: ICD-10-CM

## 2021-04-02 DIAGNOSIS — R45.89 DEPRESSED AFFECT: ICD-10-CM

## 2021-04-02 PROCEDURE — 0501F PRENATAL FLOW SHEET: CPT | Performed by: OBSTETRICS & GYNECOLOGY

## 2021-04-02 RX ORDER — LABETALOL 100 MG/1
200 TABLET, FILM COATED ORAL DAILY
Qty: 60 TAB | Refills: 1 | Status: SHIPPED
Start: 2021-04-02 | End: 2021-09-24

## 2021-04-02 RX ORDER — SERTRALINE HYDROCHLORIDE 50 MG/1
50 TABLET, FILM COATED ORAL DAILY
Qty: 60 TAB | Refills: 2 | Status: SHIPPED | OUTPATIENT
Start: 2021-04-02 | End: 2021-09-29 | Stop reason: ALTCHOICE

## 2021-04-06 LAB
2ND TRIMESTER 4 SCREEN SERPL-IMP: ABNORMAL
2ND TRIMESTER 4 SCREEN SERPL-IMP: ABNORMAL
AFP ADJ MOM SERPL: 1.22
AFP SERPL-MCNC: 34.9 NG/ML
AGE AT DELIVERY: 44 YR
COMMENTS, 018014: ABNORMAL
FET TS 18 RISK FROM MAT AGE: ABNORMAL
FET TS 21 RISK FROM MAT AGE: 29
GA METHOD: ABNORMAL
GA: 15 WEEKS
HCG ADJ MOM SERPL: 2.19
HCG SERPL-ACNC: ABNORMAL MIU/ML
IDDM PATIENT QL: NO
INHIBIN A ADJ MOM SERPL: 2.74
INHIBIN A SERPL-MCNC: 446.24 PG/ML
MULTIPLE PREGNANCY: NO
NEURAL TUBE DEFECT RISK FETUS: 6110 %
RESULTS, 017389: ABNORMAL
TS 18 RISK FETUS: ABNORMAL
TS 21 RISK FETUS: 17
U ESTRIOL ADJ MOM SERPL: 1.1
U ESTRIOL SERPL-MCNC: 0.82 NG/ML

## 2021-05-03 ENCOUNTER — ROUTINE PRENATAL (OUTPATIENT)
Dept: OBGYN CLINIC | Age: 46
End: 2021-05-03
Payer: COMMERCIAL

## 2021-05-03 DIAGNOSIS — Z3A.19 19 WEEKS GESTATION OF PREGNANCY: ICD-10-CM

## 2021-05-03 DIAGNOSIS — O09.529 ANTEPARTUM MULTIGRAVIDA OF ADVANCED MATERNAL AGE: Primary | ICD-10-CM

## 2021-05-03 DIAGNOSIS — O16.9 ELEVATED BLOOD PRESSURE AFFECTING PREGNANCY, ANTEPARTUM: ICD-10-CM

## 2021-05-03 PROCEDURE — 0502F SUBSEQUENT PRENATAL CARE: CPT | Performed by: OBSTETRICS & GYNECOLOGY

## 2021-05-03 NOTE — PATIENT INSTRUCTIONS
Counting Your Baby's Kicks: Care Instructions  Your Care Instructions     Counting your baby's kicks is one way your doctor can tell that your baby is healthy. Most women--especially in a first pregnancy--feel their baby move for the first time between 16 and 22 weeks. The movement may feel like flutters rather than kicks. Your baby may move more at certain times of the day. When you are active, you may notice less kicking than when you are resting. At your prenatal visits, your doctor will ask whether the baby is active. In your last trimester, your doctor may ask you to count the number of times you feel your baby move. Follow-up care is a key part of your treatment and safety. Be sure to make and go to all appointments, and call your doctor if you are having problems. It's also a good idea to know your test results and keep a list of the medicines you take. How do you count fetal kicks? · A common method of checking your baby's movement is to count the number of kicks or moves you feel in 1 hour. Ten movements (such as kicks, flutters, or rolls) in 1 hour are normal. Some doctors suggest that you count in the morning until you get to 10 movements. Then you can quit for that day and start again the next day. · Pick your baby's most active time of day to count. This may be any time from morning to evening. · If you do not feel 10 movements in an hour, your baby may be sleeping. Wait for the next hour and count again. When should you call for help? Call your doctor now or seek immediate medical care if:    · You noticed that your baby has stopped moving or is moving much less than normal.   Watch closely for changes in your health, and be sure to contact your doctor if you have any problems. Where can you learn more? Go to http://www.gray.com/  Enter [id number] in the search box to learn more about \"Counting Your Baby's Kicks: Care Instructions. \"  Current as of: October 8, 2020               Content Version: 12.8  © 9358-4061 [employer]wise[employer]orporated. Care instructions adapted under license by [facility] (which disclaims liability or warranty for this information). If you have questions about a medical condition or this instruction, always ask your healthcare professional. [address] any warranty or liability for your use of this information.

## 2021-05-09 VITALS
SYSTOLIC BLOOD PRESSURE: 146 MMHG | DIASTOLIC BLOOD PRESSURE: 87 MMHG | HEIGHT: 67 IN | HEART RATE: 84 BPM | BODY MASS INDEX: 25.74 KG/M2 | OXYGEN SATURATION: 98 % | WEIGHT: 164 LBS

## 2021-05-09 RX ORDER — GUAIFENESIN 100 MG/5ML
81 LIQUID (ML) ORAL DAILY
Qty: 90 TAB | Refills: 2 | Status: SHIPPED
Start: 2021-05-09 | End: 2021-09-24

## 2021-06-04 ENCOUNTER — TELEPHONE (OUTPATIENT)
Dept: OBGYN CLINIC | Age: 46
End: 2021-06-04

## 2021-06-07 DIAGNOSIS — O26.842 UTERINE SIZE-DATE DISCREPANCY IN SECOND TRIMESTER: Primary | ICD-10-CM

## 2021-06-07 DIAGNOSIS — O26.842 UTERINE SIZE-DATE DISCREPANCY IN SECOND TRIMESTER: ICD-10-CM

## 2021-06-07 PROCEDURE — 76805 OB US >/= 14 WKS SNGL FETUS: CPT | Performed by: OBSTETRICS & GYNECOLOGY

## 2021-07-06 ENCOUNTER — ROUTINE PRENATAL (OUTPATIENT)
Dept: OBGYN CLINIC | Age: 46
End: 2021-07-06
Payer: COMMERCIAL

## 2021-07-06 VITALS
HEIGHT: 67 IN | HEART RATE: 95 BPM | DIASTOLIC BLOOD PRESSURE: 90 MMHG | SYSTOLIC BLOOD PRESSURE: 137 MMHG | OXYGEN SATURATION: 100 % | RESPIRATION RATE: 16 BRPM | BODY MASS INDEX: 27.31 KG/M2 | WEIGHT: 174 LBS

## 2021-07-06 DIAGNOSIS — Z34.90 PREGNANCY, UNSPECIFIED GESTATIONAL AGE: Primary | ICD-10-CM

## 2021-07-06 DIAGNOSIS — O09.529 ANTEPARTUM MULTIGRAVIDA OF ADVANCED MATERNAL AGE: ICD-10-CM

## 2021-07-06 PROCEDURE — 0502F SUBSEQUENT PRENATAL CARE: CPT | Performed by: OBSTETRICS & GYNECOLOGY

## 2021-07-06 RX ORDER — NITROFURANTOIN 25; 75 MG/1; MG/1
100 CAPSULE ORAL 2 TIMES DAILY
Qty: 14 CAPSULE | Refills: 0 | Status: SHIPPED
Start: 2021-07-06 | End: 2021-09-24

## 2021-07-06 NOTE — PATIENT INSTRUCTIONS
Counting Your Baby's Kicks: Care Instructions  Your Care Instructions     Counting your baby's kicks is one way your doctor can tell that your baby is healthy. Most women--especially in a first pregnancy--feel their baby move for the first time between 16 and 22 weeks. The movement may feel like flutters rather than kicks. Your baby may move more at certain times of the day. When you are active, you may notice less kicking than when you are resting. At your prenatal visits, your doctor will ask whether the baby is active. In your last trimester, your doctor may ask you to count the number of times you feel your baby move. Follow-up care is a key part of your treatment and safety. Be sure to make and go to all appointments, and call your doctor if you are having problems. It's also a good idea to know your test results and keep a list of the medicines you take. How do you count fetal kicks? · A common method of checking your baby's movement is to count the number of kicks or moves you feel in 1 hour. Ten movements (such as kicks, flutters, or rolls) in 1 hour are normal. Some doctors suggest that you count in the morning until you get to 10 movements. Then you can quit for that day and start again the next day. · Pick your baby's most active time of day to count. This may be any time from morning to evening. · If you do not feel 10 movements in an hour, your baby may be sleeping. Wait for the next hour and count again. When should you call for help? Call your doctor now or seek immediate medical care if:    · You noticed that your baby has stopped moving or is moving much less than normal.   Watch closely for changes in your health, and be sure to contact your doctor if you have any problems. Where can you learn more? Go to http://www.gray.com/  Enter [id number] in the search box to learn more about \"Counting Your Baby's Kicks: Care Instructions. \"  Current as of: October 8, 2020               Content Version: 12.8  © 4760-9101 [employer]wise, [employer]orporated. Care instructions adapted under license by [employer] (which disclaims liability or warranty for this information). If you have questions about a medical condition or this instruction, always ask your healthcare professional. [address] any warranty or liability for your use of this information.

## 2021-07-07 LAB
BASOPHILS # BLD AUTO: 0 X10E3/UL (ref 0–0.2)
BASOPHILS NFR BLD AUTO: 0 %
EOSINOPHIL # BLD AUTO: 0.1 X10E3/UL (ref 0–0.4)
EOSINOPHIL NFR BLD AUTO: 1 %
ERYTHROCYTE [DISTWIDTH] IN BLOOD BY AUTOMATED COUNT: 13 % (ref 11.7–15.4)
GLUCOSE 1H P 50 G GLC PO SERPL-MCNC: 135 MG/DL (ref 65–139)
HCT VFR BLD AUTO: 33.1 % (ref 34–46.6)
HGB BLD-MCNC: 10.6 G/DL (ref 11.1–15.9)
IMM GRANULOCYTES # BLD AUTO: 0.1 X10E3/UL (ref 0–0.1)
IMM GRANULOCYTES NFR BLD AUTO: 1 %
LYMPHOCYTES # BLD AUTO: 2 X10E3/UL (ref 0.7–3.1)
LYMPHOCYTES NFR BLD AUTO: 18 %
MCH RBC QN AUTO: 29.7 PG (ref 26.6–33)
MCHC RBC AUTO-ENTMCNC: 32 G/DL (ref 31.5–35.7)
MCV RBC AUTO: 93 FL (ref 79–97)
MONOCYTES # BLD AUTO: 0.8 X10E3/UL (ref 0.1–0.9)
MONOCYTES NFR BLD AUTO: 8 %
NEUTROPHILS # BLD AUTO: 7.9 X10E3/UL (ref 1.4–7)
NEUTROPHILS NFR BLD AUTO: 72 %
PLATELET # BLD AUTO: 212 X10E3/UL (ref 150–450)
RBC # BLD AUTO: 3.57 X10E6/UL (ref 3.77–5.28)
WBC # BLD AUTO: 10.9 X10E3/UL (ref 3.4–10.8)

## 2021-07-22 ENCOUNTER — ROUTINE PRENATAL (OUTPATIENT)
Dept: OBGYN CLINIC | Age: 46
End: 2021-07-22
Payer: COMMERCIAL

## 2021-07-22 VITALS
BODY MASS INDEX: 27.31 KG/M2 | RESPIRATION RATE: 18 BRPM | OXYGEN SATURATION: 99 % | DIASTOLIC BLOOD PRESSURE: 89 MMHG | WEIGHT: 174 LBS | HEART RATE: 93 BPM | HEIGHT: 67 IN | SYSTOLIC BLOOD PRESSURE: 133 MMHG

## 2021-07-22 DIAGNOSIS — Z3A.30 30 WEEKS GESTATION OF PREGNANCY: ICD-10-CM

## 2021-07-22 DIAGNOSIS — O09.529 ANTEPARTUM MULTIGRAVIDA OF ADVANCED MATERNAL AGE: Primary | ICD-10-CM

## 2021-07-22 DIAGNOSIS — O16.9 ELEVATED BLOOD PRESSURE AFFECTING PREGNANCY, ANTEPARTUM: ICD-10-CM

## 2021-07-22 PROCEDURE — 0502F SUBSEQUENT PRENATAL CARE: CPT | Performed by: OBSTETRICS & GYNECOLOGY

## 2021-07-22 NOTE — PATIENT INSTRUCTIONS
Counting Your Baby's Kicks: Care Instructions  Your Care Instructions     Counting your baby's kicks is one way your doctor can tell that your baby is healthy. Most women--especially in a first pregnancy--feel their baby move for the first time between 16 and 22 weeks. The movement may feel like flutters rather than kicks. Your baby may move more at certain times of the day. When you are active, you may notice less kicking than when you are resting. At your prenatal visits, your doctor will ask whether the baby is active. In your last trimester, your doctor may ask you to count the number of times you feel your baby move. Follow-up care is a key part of your treatment and safety. Be sure to make and go to all appointments, and call your doctor if you are having problems. It's also a good idea to know your test results and keep a list of the medicines you take. How do you count fetal kicks? · A common method of checking your baby's movement is to count the number of kicks or moves you feel in 1 hour. Ten movements (such as kicks, flutters, or rolls) in 1 hour are normal. Some doctors suggest that you count in the morning until you get to 10 movements. Then you can quit for that day and start again the next day. · Pick your baby's most active time of day to count. This may be any time from morning to evening. · If you do not feel 10 movements in an hour, your baby may be sleeping. Wait for the next hour and count again. When should you call for help? Call your doctor now or seek immediate medical care if:    · You noticed that your baby has stopped moving or is moving much less than normal.   Watch closely for changes in your health, and be sure to contact your doctor if you have any problems. Where can you learn more? Go to http://aysha-juan manuel.info/  Enter [id number] in the search box to learn more about \"Counting Your Baby's Kicks: Care Instructions. \"  Current as of: October 8, 2020               Content Version: 12.8  © 5310-3531 [employer]wise, Incorporated. Care instructions adapted under license by [employer] (which disclaims liability or warranty for this information). If you have questions about a medical condition or this instruction, always ask your healthcare professional. [address] any warranty or liability for your use of this information. jill all pertinent systems normal

## 2021-08-05 ENCOUNTER — HOSPITAL ENCOUNTER (OUTPATIENT)
Age: 46
Discharge: HOME | End: 2021-08-05
Attending: OBSTETRICS & GYNECOLOGY | Admitting: OBSTETRICS & GYNECOLOGY
Payer: COMMERCIAL

## 2021-08-05 ENCOUNTER — ROUTINE PRENATAL (OUTPATIENT)
Dept: OBGYN CLINIC | Age: 46
End: 2021-08-05
Payer: COMMERCIAL

## 2021-08-05 VITALS
TEMPERATURE: 98.2 F | WEIGHT: 179 LBS | RESPIRATION RATE: 20 BRPM | HEIGHT: 67 IN | SYSTOLIC BLOOD PRESSURE: 114 MMHG | DIASTOLIC BLOOD PRESSURE: 62 MMHG | BODY MASS INDEX: 28.09 KG/M2 | HEART RATE: 78 BPM

## 2021-08-05 VITALS
WEIGHT: 179 LBS | HEART RATE: 85 BPM | OXYGEN SATURATION: 98 % | DIASTOLIC BLOOD PRESSURE: 100 MMHG | HEIGHT: 67 IN | SYSTOLIC BLOOD PRESSURE: 144 MMHG | BODY MASS INDEX: 28.09 KG/M2

## 2021-08-05 DIAGNOSIS — Z3A.32 32 WEEKS GESTATION OF PREGNANCY: ICD-10-CM

## 2021-08-05 DIAGNOSIS — O09.529 ANTEPARTUM MULTIGRAVIDA OF ADVANCED MATERNAL AGE: Primary | ICD-10-CM

## 2021-08-05 DIAGNOSIS — O16.9 ELEVATED BLOOD PRESSURE AFFECTING PREGNANCY, ANTEPARTUM: ICD-10-CM

## 2021-08-05 PROBLEM — Z34.90 PREGNANT: Status: ACTIVE | Noted: 2021-08-05

## 2021-08-05 PROBLEM — O09.523 ADVANCED MATERNAL AGE IN MULTIGRAVIDA, THIRD TRIMESTER: Status: ACTIVE | Noted: 2021-08-05

## 2021-08-05 PROBLEM — Z98.84 HISTORY OF GASTRIC RESTRICTIVE SURGERY: Status: ACTIVE | Noted: 2021-08-05

## 2021-08-05 LAB
ALBUMIN SERPL-MCNC: 3 G/DL (ref 3.5–5)
ALBUMIN/GLOB SERPL: 0.7 {RATIO} (ref 1.1–2.2)
ALP SERPL-CCNC: 91 U/L (ref 45–117)
ALT SERPL-CCNC: 13 U/L (ref 12–78)
ANION GAP SERPL CALC-SCNC: 6 MMOL/L (ref 5–15)
APPEARANCE UR: ABNORMAL
AST SERPL W P-5'-P-CCNC: 9 U/L (ref 15–37)
BACTERIA URNS QL MICRO: ABNORMAL /HPF
BASOPHILS # BLD: 0.1 K/UL (ref 0–0.1)
BASOPHILS NFR BLD: 0 % (ref 0–1)
BILIRUB SERPL-MCNC: 0.2 MG/DL (ref 0.2–1)
BILIRUB UR QL: NEGATIVE
BUN SERPL-MCNC: 10 MG/DL (ref 6–20)
BUN/CREAT SERPL: 15 (ref 12–20)
CA-I BLD-MCNC: 8.9 MG/DL (ref 8.5–10.1)
CHLORIDE SERPL-SCNC: 105 MMOL/L (ref 97–108)
CO2 SERPL-SCNC: 23 MMOL/L (ref 21–32)
COLOR UR: ABNORMAL
CREAT SERPL-MCNC: 0.68 MG/DL (ref 0.55–1.02)
DIFFERENTIAL METHOD BLD: ABNORMAL
EOSINOPHIL # BLD: 0.1 K/UL (ref 0–0.4)
EOSINOPHIL NFR BLD: 1 % (ref 0–7)
ERYTHROCYTE [DISTWIDTH] IN BLOOD BY AUTOMATED COUNT: 13.1 % (ref 11.5–14.5)
GLOBULIN SER CALC-MCNC: 4.1 G/DL (ref 2–4)
GLUCOSE SERPL-MCNC: 95 MG/DL (ref 65–100)
GLUCOSE UR STRIP.AUTO-MCNC: NEGATIVE MG/DL
HCT VFR BLD AUTO: 32.3 % (ref 35–47)
HGB BLD-MCNC: 10.5 G/DL (ref 11.5–16)
HGB UR QL STRIP: NEGATIVE
IMM GRANULOCYTES # BLD AUTO: 0.1 K/UL (ref 0–0.04)
IMM GRANULOCYTES NFR BLD AUTO: 1 % (ref 0–0.5)
KETONES UR QL STRIP.AUTO: NEGATIVE MG/DL
LEUKOCYTE ESTERASE UR QL STRIP.AUTO: ABNORMAL
LYMPHOCYTES # BLD: 2.1 K/UL (ref 0.8–3.5)
LYMPHOCYTES NFR BLD: 18 % (ref 12–49)
MCH RBC QN AUTO: 29.1 PG (ref 26–34)
MCHC RBC AUTO-ENTMCNC: 32.5 G/DL (ref 30–36.5)
MCV RBC AUTO: 89.5 FL (ref 80–99)
MONOCYTES # BLD: 0.9 K/UL (ref 0–1)
MONOCYTES NFR BLD: 8 % (ref 5–13)
MUCOUS THREADS URNS QL MICRO: ABNORMAL /LPF
NEUTS SEG # BLD: 8.9 K/UL (ref 1.8–8)
NEUTS SEG NFR BLD: 72 % (ref 32–75)
NITRITE UR QL STRIP.AUTO: NEGATIVE
NRBC # BLD: 0 K/UL (ref 0–0.01)
NRBC BLD-RTO: 0 PER 100 WBC
PH UR STRIP: 6 [PH] (ref 5–8)
PLATELET # BLD AUTO: 221 K/UL (ref 150–400)
PMV BLD AUTO: 10 FL (ref 8.9–12.9)
POTASSIUM SERPL-SCNC: 3.9 MMOL/L (ref 3.5–5.1)
PROT SERPL-MCNC: 7.1 G/DL (ref 6.4–8.2)
PROT UR STRIP-MCNC: NEGATIVE MG/DL
RBC # BLD AUTO: 3.61 M/UL (ref 3.8–5.2)
RBC #/AREA URNS HPF: ABNORMAL /HPF (ref 0–5)
SODIUM SERPL-SCNC: 134 MMOL/L (ref 136–145)
SP GR UR REFRACTOMETRY: 1.01 (ref 1–1.03)
UROBILINOGEN UR QL STRIP.AUTO: 0.1 EU/DL (ref 0.1–1)
WBC # BLD AUTO: 12.1 K/UL (ref 3.6–11)
WBC URNS QL MICRO: ABNORMAL /HPF (ref 0–4)

## 2021-08-05 PROCEDURE — 81001 URINALYSIS AUTO W/SCOPE: CPT

## 2021-08-05 PROCEDURE — 85025 COMPLETE CBC W/AUTO DIFF WBC: CPT

## 2021-08-05 PROCEDURE — 74011250637 HC RX REV CODE- 250/637: Performed by: OBSTETRICS & GYNECOLOGY

## 2021-08-05 PROCEDURE — 80053 COMPREHEN METABOLIC PANEL: CPT

## 2021-08-05 PROCEDURE — 0502F SUBSEQUENT PRENATAL CARE: CPT | Performed by: OBSTETRICS & GYNECOLOGY

## 2021-08-05 PROCEDURE — 99285 EMERGENCY DEPT VISIT HI MDM: CPT

## 2021-08-05 RX ORDER — GRANULES FOR ORAL 3 G/1
3 POWDER ORAL
Status: COMPLETED | OUTPATIENT
Start: 2021-08-05 | End: 2021-08-05

## 2021-08-05 RX ADMIN — FOSFOMYCIN TROMETHAMINE 3 G: 3 POWDER ORAL at 19:33

## 2021-08-05 NOTE — PATIENT INSTRUCTIONS
Counting Your Baby's Kicks: Care Instructions  Your Care Instructions     Counting your baby's kicks is one way your doctor can tell that your baby is healthy. Most womenespecially in a first pregnancyfeel their baby move for the first time between 16 and 22 weeks. The movement may feel like flutters rather than kicks. Your baby may move more at certain times of the day. When you are active, you may notice less kicking than when you are resting. At your prenatal visits, your doctor will ask whether the baby is active. In your last trimester, your doctor may ask you to count the number of times you feel your baby move. Follow-up care is a key part of your treatment and safety. Be sure to make and go to all appointments, and call your doctor if you are having problems. It's also a good idea to know your test results and keep a list of the medicines you take. How do you count fetal kicks? · A common method of checking your baby's movement is to count the number of kicks or moves you feel in 1 hour. Ten movements (such as kicks, flutters, or rolls) in 1 hour are normal. Some doctors suggest that you count in the morning until you get to 10 movements. Then you can quit for that day and start again the next day. · Pick your baby's most active time of day to count. This may be any time from morning to evening. · If you do not feel 10 movements in an hour, your baby may be sleeping. Wait for the next hour and count again. When should you call for help? Call your doctor now or seek immediate medical care if:    · You noticed that your baby has stopped moving or is moving much less than normal.   Watch closely for changes in your health, and be sure to contact your doctor if you have any problems. Where can you learn more? Go to http://www.gray.com/  Enter [id number] in the search box to learn more about \"Counting Your Baby's Kicks: Care Instructions. \"  Current as of: October 8, 2020               Content Version: 12.8  © 5643-5808 Healthwise, Incorporated. Care instructions adapted under license by [employer] (which disclaims liability or warranty for this information). If you have questions about a medical condition or this instruction, always ask your healthcare professional. [address] any warranty or liability for your use of this information.

## 2021-08-05 NOTE — H&P
History and Physical    Patient: Nikita Aparicio MRN: [id number]  SSN: [id number]    YOB: 1975  Age: 39 y.o. Sex: female      Subjective:      Nikita Aparicio is a 39 y.o. female  Sent from office for evaluation of elevated BP. Previously on Labetalol 200 mg BID( stopped by MFM since it was bottoming out her BP per pt.) Denies any HA's or visual changes. Past Medical History:   Diagnosis Date    ADHD     Asthma     Depression     Essential hypertension     Gastrointestinal disorder     GERD (gastroesophageal reflux disease)     Ill-defined condition     ADD    Psychiatric disorder      Past Surgical History:   Procedure Laterality Date    HX CHOLECYSTECTOMY  2019    Robotic-assisted laparoscopic cholecystectomy with firefly and IOC.  HX GI      gastric sleeve    HX HEENT      wisdom teeth    HX OTHER SURGICAL        Family History   Problem Relation Age of Onset    Diabetes Neg Hx     Hypertension Neg Hx      Social History     Tobacco Use    Smoking status: Former Smoker     Packs/day: 0.25     Years: 3.00     Pack years: 0.75     Quit date: 2014     Years since quittin.3    Smokeless tobacco: Never Used   Substance Use Topics    Alcohol use: Not Currently     Alcohol/week: 5.0 standard drinks     Types: 5 Glasses of wine per week      Prior to Admission medications    Medication Sig Start Date End Date Taking? Authorizing Provider   aspirin 81 mg chewable tablet Take 1 Tab by mouth daily. 21  Yes Tory Albert MD   sertraline (ZOLOFT) 50 mg tablet Take 1 Tab by mouth daily. Indications: major depressive disorder 21  Yes Tory Albert MD   Desvenlafaxine (Pristiq) 100 mg Tb24 Take 150 Tabs by mouth daily. Yes Provider, Historical   traZODone (DESYREL) 100 mg tablet Take 100 mg by mouth nightly. Yes Provider, Historical   prenatal vit-iron fumarate-fa 27 mg iron- 0.8 mg tab tablet Take 1 Tab by mouth daily.  3/4/21  Yes Abdirahman Rafael Ahjua MD   magnesium oxide (MAG-OX) 400 mg tablet Take 1 Tab by mouth daily. 3/4/21  Yes Ricardo Hylton MD   acetaminophen (TYLENOL) 325 mg tablet Take 2 Tabs by mouth every four (4) hours as needed for Pain or Fever. Patient taking differently: Take 650 mg by mouth DIALYSIS PRN for Pain or Fever. 4/12/19  Yes Ko Ponce MD   dextroamphetamine-amphetamine (ADDERALL) 10 mg tablet Take  by mouth four (4) times daily. Yes Johana Streeter MD   omeprazole (PRILOSEC) 10 mg capsule Take 40 mg by mouth daily. Yes Ferdinand, MD Johana   nitrofurantoin, macrocrystal-monohydrate, (Macrobid) 100 mg capsule Take 1 Capsule by mouth two (2) times a day. Patient not taking: Reported on 8/5/2021 7/6/21   Ricardo Hylton MD   labetaloL (NORMODYNE) 100 mg tablet Take 2 Tabs by mouth daily. Indications: high blood pressure  Patient not taking: Reported on 8/5/2021 4/2/21   Ricardo Hylton MD   butalbital-acetaminophen-caffeine (FIORICET, ESGIC) -40 mg per tablet Take 1 Tab by mouth every four (4) hours as needed for Headache. Patient not taking: Reported on 8/5/2021 3/12/21   Ricardo Hylton MD   FLUoxetine (PROZAC) 10 mg capsule Take 10 mg by mouth nightly. Patient not taking: Reported on 8/5/2021    Johana Streeter MD        No Known Allergies    Review of Systems:  A comprehensive review of systems was negative except for that written in the History of Present Illness.     Objective:     Vitals:    08/05/21 1724 08/05/21 1725   BP:  (!) 135/96   Pulse:  99   Resp:  18   Temp:  98.2 °F (36.8 °C)   Weight: 81.2 kg (179 lb)    Height: 5' 7\" (1.702 m)         Physical Exam:  GENERAL: alert, cooperative, no distress, appears stated age  Abd: NST reactive, no UC's    Assessment:     Hospital Problems  Date Reviewed: 8/5/2021        Codes Class Noted POA    Pregnant ICD-10-CM: Z34.90  ICD-9-CM: V22.2  8/5/2021 Unknown        32 weeks gestation of pregnancy ICD-10-CM: Z3A.32  ICD-9-CM: V22.2  8/5/2021 Unknown        Advanced maternal age in multigravida, third trimester ICD-10-CM: O09.523  ICD-9-CM: 659.63  8/5/2021 Unknown        History of gastric restrictive surgery ICD-10-CM: Z98.84  ICD-9-CM: V45.86  8/5/2021 Unknown        Elevated BP without diagnosis of hypertension ICD-10-CM: R03.0  ICD-9-CM: 796.2  4/9/2019 Yes              Plan:     Monitor BP's  Labs  May need to restart Labetalol at lower dose    Signed By: Romy Cardenas MD     August 5, 2021

## 2021-08-05 NOTE — PROGRESS NOTES
SUBJECTIVE:  Lucero Mckoy presents today for return prenatal visit. She complains of: no unusual complaints. Patient's medical, obstetrical, social, and family histories; medications; and lab results have been reviewed. REVIEW OF SYSTEMS: A comprehensive review of systems was negative except for that written in the HPI. 32w3d    OBJECTIVE:   Visit Vitals  BP (!) 144/100 (BP 1 Location: Right arm, BP Patient Position: Sitting)   Pulse 85   Ht 5' 7\" (1.702 m)   Wt 179 lb (81.2 kg)   SpO2 98%   BMI 28.04 kg/m²      Refer to Prenatal Physical for exam findings    ASSESSMENT/PLAN:  Prenatal Education: 3rd trimester topics:  signs and symptoms of PIH. Sent to LD for BP evaluation. Discussed with patient possible need to restart BP ,medications, preautions,  Patient/guardian understands and agrees with the plan of care.

## 2021-08-05 NOTE — DISCHARGE INSTRUCTIONS
Resume taking labetalol at half the previous dose, which will be 100 mg daily. Follow up with Dr. Tatiana Santos with next schedule office appointment. Notify physician for any worsening symptoms or concerns. Patient Education        Preeclampsia: Care Instructions  Overview     Preeclampsia occurs when a woman's blood pressure rises during pregnancy. Often with preeclampsia, you also have swelling in your legs, hands, and face. A test may show too much protein in your urine. Preeclampsia is also called toxemia. If preeclampsia is severe and not treated, it can lead to seizures (eclampsia) and damage to your liver or kidneys. Preeclampsia can prevent your baby from getting enough food and oxygen. This can cause a low birth weight or other problems. Your doctor will watch you closely to prevent these problems. He or she also may recommend that you reduce your activity. If your preeclampsia is a danger to your health or the health of your baby, your doctor may need to deliver your baby early. While preeclampsia is a concern, most women with preeclampsia have healthy babies. After a woman gives birth, preeclampsia usually goes away on its own. But symptoms may last a few weeks or more and can get worse after delivery. Rarely, symptoms of preeclampsia don't show up until days or even weeks after childbirth. Follow-up care is a key part of your treatment and safety. Be sure to make and go to all appointments, and call your doctor if you are having problems. It's also a good idea to know your test results and keep a list of the medicines you take. How can you care for yourself at home? · Take and record your blood pressure at home if your doctor tells you to. ? Learn the importance of the two measures of blood pressure (such as 120 over 80, or 120/80). The first number is the systolic pressure, which is the force of blood on the artery walls as the heart pumps.  The second number is the diastolic pressure, which is the force of blood on the artery walls between heartbeats, when the heart is at rest. You have a choice of monitors to use. ? Manual monitor: You pump up the cuff and use a stethoscope to listen for your pulse. ? Electronic monitor: The cuff inflates, and a gauge shows your pulse rate. ? To take your blood pressure:  ? Ask your doctor to check your blood pressure monitor to be sure that it is accurate and that the cuff fits you. Also ask your doctor to watch you to make sure that you are using it right. ? You should not eat, use tobacco products, or use medicine known to raise blood pressure (such as some nasal decongestant sprays) before you take your blood pressure. ? Avoid taking your blood pressure if you have just exercised. Also avoid taking it if you are nervous or upset. Rest at least 15 minutes before you take your blood pressure. · If your doctor advises, check the protein levels in your urine. Your doctor or nurse will show you how to do this. · Take your medicines exactly as prescribed. Call your doctor if you think you are having a problem with your medicine. · Do not smoke. Quitting smoking will help improve your baby's growth and health. If you need help quitting, talk to your doctor about stop-smoking programs and medicines. These can increase your chances of quitting for good. · Eat a balanced and healthy diet that has lots of fruits and vegetables. · If your doctor advised bed rest, be sure to stay off your feet and rest as much as possible. ? Keep a phone, notepad, and pen near the bed where you can easily reach them. ? Gently stretch your legs every hour to maintain good blood flow. ? Have another family member pack snacks and lunch food in a cooler close to your bed. ? Use this time for activities that you usually cannot find time for, such as reading, craft projects, or letter writing.   · You can keep track of your baby's health by noting the length of time it takes to count 10 movements (such as kicks, flutters, or rolls). Feeling 10 movements in less than 1 hour is considered normal. Track your baby's movements once each day. Bring this record with you to each prenatal visit. When should you call for help? Call 911 anytime you think you may need emergency care. For example, call if:    · You passed out (lost consciousness).     · You have a seizure. Call your doctor now or seek immediate medical care if:    · You have symptoms of preeclampsia, such as:  ? Sudden swelling of your face, hands, or feet. ? New vision problems (such as dimness, blurring, or seeing spots). ? A severe headache.     · Your blood pressure is higher than it should be, or it rises suddenly.     · You have new nausea or vomiting.     · You think that you are in labor.     · You have pain in your belly or pelvis. Watch closely for changes in your health, and be sure to contact your doctor if:    · You gain weight rapidly. Where can you learn more? Go to http://www.gray.com/  Enter Z954 in the search box to learn more about \"Preeclampsia: Care Instructions. \"  Current as of: October 8, 2020               Content Version: 12.8  © 0379-1654 [employer]. Care instructions adapted under license by [employer] (which disclaims liability or warranty for this information). If you have questions about a medical condition or this instruction, always ask your healthcare professional. [place] [address] any warranty or liability for your use of this information.

## 2021-08-05 NOTE — PROGRESS NOTES
1715-PT.  TO LABOR AND DELIVERY FROM OFFICE TO BE EVALUATED FOR ELEVATED BLOOD PRESSURE, PT. DENIES HEADACHE, BLURRED VISION, RIGHT UPPER QUADRANT PAIN, SWELLING, VAGINAL BLEEDING OR LEAKING OF FLUIDS

## 2021-08-05 NOTE — PROGRESS NOTES
1900  BS report received from PATITO De La Garza, SARITA. Pt resting quietly without distress. No complaints voiced. 1915  Dr. Juve Parker notified and made aware of all lab results. Orders received. 1920  Plan of care explained to pt (monurol, discharge home and resume taking labetalol po at half the previous dose--100 mg daily). Pt verbalized understanding. 1948  Written and verbal discharge instructions given to pt. Pt verbalized understanding. Preeclampsia information given and explained to pt. Pt made aware all of her BP's and labwork  for preeclampsia have resulted normal.  Pt states she has a \"My Chart\". Writer also answered pt questions regarding what to expect for her stay when she returns to deliver infant. Pt states she plans for an elective C/S. Pt declined offer for w/c discharge.     1950  Pt discharged ambulatory in good condition per her request.

## 2021-08-12 ENCOUNTER — ROUTINE PRENATAL (OUTPATIENT)
Dept: OBGYN CLINIC | Age: 46
End: 2021-08-12
Payer: COMMERCIAL

## 2021-08-12 VITALS
WEIGHT: 177 LBS | BODY MASS INDEX: 27.78 KG/M2 | HEIGHT: 67 IN | SYSTOLIC BLOOD PRESSURE: 140 MMHG | DIASTOLIC BLOOD PRESSURE: 80 MMHG

## 2021-08-12 DIAGNOSIS — Z3A.33 33 WEEKS GESTATION OF PREGNANCY: ICD-10-CM

## 2021-08-12 DIAGNOSIS — O10.919 CHRONIC HYPERTENSION DURING PREGNANCY: ICD-10-CM

## 2021-08-12 DIAGNOSIS — O09.529 ANTEPARTUM MULTIGRAVIDA OF ADVANCED MATERNAL AGE: Primary | ICD-10-CM

## 2021-08-12 PROCEDURE — 0502F SUBSEQUENT PRENATAL CARE: CPT | Performed by: OBSTETRICS & GYNECOLOGY

## 2021-08-12 NOTE — PATIENT INSTRUCTIONS
Counting Your Baby's Kicks: Care Instructions  Your Care Instructions     Counting your baby's kicks is one way your doctor can tell that your baby is healthy. Most women--especially in a first pregnancy--feel their baby move for the first time between 16 and 22 weeks. The movement may feel like flutters rather than kicks. Your baby may move more at certain times of the day. When you are active, you may notice less kicking than when you are resting. At your prenatal visits, your doctor will ask whether the baby is active. In your last trimester, your doctor may ask you to count the number of times you feel your baby move. Follow-up care is a key part of your treatment and safety. Be sure to make and go to all appointments, and call your doctor if you are having problems. It's also a good idea to know your test results and keep a list of the medicines you take. How do you count fetal kicks? · A common method of checking your baby's movement is to count the number of kicks or moves you feel in 1 hour. Ten movements (such as kicks, flutters, or rolls) in 1 hour are normal. Some doctors suggest that you count in the morning until you get to 10 movements. Then you can quit for that day and start again the next day. · Pick your baby's most active time of day to count. This may be any time from morning to evening. · If you do not feel 10 movements in an hour, your baby may be sleeping. Wait for the next hour and count again. When should you call for help? Call your doctor now or seek immediate medical care if:    · You noticed that your baby has stopped moving or is moving much less than normal.   Watch closely for changes in your health, and be sure to contact your doctor if you have any problems. Where can you learn more? Go to http://www.gray.com/  Enter [id number] in the search box to learn more about \"Counting Your Baby's Kicks: Care Instructions. \"  Current as of: October 8, 2020               Content Version: 12.8  © 6903-9594 Healthwise, Incorporated. Care instructions adapted under license by [employer] (which disclaims liability or warranty for this information). If you have questions about a medical condition or this instruction, always ask your healthcare professional. [address] any warranty or liability for your use of this information.

## 2021-08-25 ENCOUNTER — ROUTINE PRENATAL (OUTPATIENT)
Dept: OBGYN CLINIC | Age: 46
End: 2021-08-25
Payer: COMMERCIAL

## 2021-08-25 VITALS
SYSTOLIC BLOOD PRESSURE: 130 MMHG | HEART RATE: 84 BPM | HEIGHT: 67 IN | DIASTOLIC BLOOD PRESSURE: 89 MMHG | WEIGHT: 185 LBS | BODY MASS INDEX: 29.03 KG/M2 | OXYGEN SATURATION: 99 %

## 2021-08-25 DIAGNOSIS — Z3A.35 35 WEEKS GESTATION OF PREGNANCY: ICD-10-CM

## 2021-08-25 DIAGNOSIS — N76.0 ACUTE VAGINITIS: ICD-10-CM

## 2021-08-25 DIAGNOSIS — O10.919 CHRONIC HYPERTENSION DURING PREGNANCY: ICD-10-CM

## 2021-08-25 DIAGNOSIS — O09.529 ANTEPARTUM MULTIGRAVIDA OF ADVANCED MATERNAL AGE: Primary | ICD-10-CM

## 2021-08-25 PROCEDURE — 0502F SUBSEQUENT PRENATAL CARE: CPT | Performed by: OBSTETRICS & GYNECOLOGY

## 2021-08-25 NOTE — PATIENT INSTRUCTIONS
Counting Your Baby's Kicks: Care Instructions  Your Care Instructions     Counting your baby's kicks is one way your doctor can tell that your baby is healthy. Most women--especially in a first pregnancy--feel their baby move for the first time between 16 and 22 weeks. The movement may feel like flutters rather than kicks. Your baby may move more at certain times of the day. When you are active, you may notice less kicking than when you are resting. At your prenatal visits, your doctor will ask whether the baby is active. In your last trimester, your doctor may ask you to count the number of times you feel your baby move. Follow-up care is a key part of your treatment and safety. Be sure to make and go to all appointments, and call your doctor if you are having problems. It's also a good idea to know your test results and keep a list of the medicines you take. How do you count fetal kicks? · A common method of checking your baby's movement is to count the number of kicks or moves you feel in 1 hour. Ten movements (such as kicks, flutters, or rolls) in 1 hour are normal. Some doctors suggest that you count in the morning until you get to 10 movements. Then you can quit for that day and start again the next day. · Pick your baby's most active time of day to count. This may be any time from morning to evening. · If you do not feel 10 movements in an hour, your baby may be sleeping. Wait for the next hour and count again. When should you call for help? Call your doctor now or seek immediate medical care if:    · You noticed that your baby has stopped moving or is moving much less than normal.   Watch closely for changes in your health, and be sure to contact your doctor if you have any problems. Where can you learn more? Go to http://www.gray.com/  Enter [id number] in the search box to learn more about \"Counting Your Baby's Kicks: Care Instructions. \"  Current as of: October 8, 2020               Content Version: 12.8  © 1693-1777 Healthwise, Incorporated. Care instructions adapted under license by [employer] (which disclaims liability or warranty for this information). If you have questions about a medical condition or this instruction, always ask your healthcare professional. [address] any warranty or liability for your use of this information.

## 2021-08-25 NOTE — PROGRESS NOTES
SMITH visit  Doing well  Good fetal movement  No labor symptoms  Patient continues to desire to proceed with elective primary csection  Precautions  gbs and labs

## 2021-08-26 LAB — HIV 1+2 AB+HIV1 P24 AG SERPL QL IA: NON REACTIVE

## 2021-08-29 LAB
A VAGINAE DNA VAG QL NAA+PROBE: ABNORMAL SCORE
B-HEM STREP SPEC QL CULT: NEGATIVE
BVAB2 DNA VAG QL NAA+PROBE: ABNORMAL SCORE
C ALBICANS DNA VAG QL NAA+PROBE: POSITIVE
C GLABRATA DNA VAG QL NAA+PROBE: NEGATIVE
C KRUSEI DNA VAG QL NAA+PROBE: NEGATIVE
C LUSITANIAE DNA VAG QL NAA+PROBE: NEGATIVE
C TRACH DNA VAG QL NAA+PROBE: NEGATIVE
CANDIDA DNA VAG QL NAA+PROBE: NEGATIVE
MEGA1 DNA VAG QL NAA+PROBE: ABNORMAL SCORE
N GONORRHOEA DNA VAG QL NAA+PROBE: NEGATIVE
T VAGINALIS DNA VAG QL NAA+PROBE: NEGATIVE

## 2021-09-21 ENCOUNTER — ROUTINE PRENATAL (OUTPATIENT)
Dept: OBGYN CLINIC | Age: 46
End: 2021-09-21
Payer: COMMERCIAL

## 2021-09-21 VITALS
RESPIRATION RATE: 16 BRPM | WEIGHT: 186 LBS | BODY MASS INDEX: 29.19 KG/M2 | HEART RATE: 87 BPM | SYSTOLIC BLOOD PRESSURE: 138 MMHG | HEIGHT: 67 IN | OXYGEN SATURATION: 99 % | DIASTOLIC BLOOD PRESSURE: 89 MMHG

## 2021-09-21 DIAGNOSIS — O10.919 CHRONIC HYPERTENSION DURING PREGNANCY: ICD-10-CM

## 2021-09-21 DIAGNOSIS — Z3A.39 39 WEEKS GESTATION OF PREGNANCY: ICD-10-CM

## 2021-09-21 DIAGNOSIS — O09.529 ANTEPARTUM MULTIGRAVIDA OF ADVANCED MATERNAL AGE: Primary | ICD-10-CM

## 2021-09-21 PROCEDURE — 0502F SUBSEQUENT PRENATAL CARE: CPT | Performed by: OBSTETRICS & GYNECOLOGY

## 2021-09-22 ENCOUNTER — ANESTHESIA EVENT (OUTPATIENT)
Dept: LABOR AND DELIVERY | Age: 46
End: 2021-09-22
Payer: COMMERCIAL

## 2021-09-22 ENCOUNTER — HOSPITAL ENCOUNTER (INPATIENT)
Age: 46
LOS: 2 days | Discharge: HOME | End: 2021-09-24
Attending: OBSTETRICS & GYNECOLOGY | Admitting: OBSTETRICS & GYNECOLOGY
Payer: COMMERCIAL

## 2021-09-22 ENCOUNTER — ANESTHESIA (OUTPATIENT)
Dept: LABOR AND DELIVERY | Age: 46
End: 2021-09-22
Payer: COMMERCIAL

## 2021-09-22 DIAGNOSIS — G89.18 POST-OP PAIN: Primary | ICD-10-CM

## 2021-09-22 DIAGNOSIS — Z3A.39 39 WEEKS GESTATION OF PREGNANCY: ICD-10-CM

## 2021-09-22 PROBLEM — Z34.90 PREGNANCY: Status: ACTIVE | Noted: 2021-09-22

## 2021-09-22 LAB
ABO + RH BLD: NORMAL
ALBUMIN SERPL-MCNC: 2.1 G/DL (ref 3.5–5)
ALBUMIN/GLOB SERPL: 0.6 {RATIO} (ref 1.1–2.2)
ALP SERPL-CCNC: 119 U/L (ref 45–117)
ALT SERPL-CCNC: 12 U/L (ref 12–78)
AMPHET UR QL SCN: POSITIVE
ANION GAP SERPL CALC-SCNC: 8 MMOL/L (ref 5–15)
APPEARANCE UR: ABNORMAL
AST SERPL W P-5'-P-CCNC: 16 U/L (ref 15–37)
BACTERIA URNS QL MICRO: ABNORMAL /HPF
BARBITURATES UR QL SCN: NEGATIVE
BASOPHILS # BLD: 0 K/UL (ref 0–0.1)
BASOPHILS # BLD: 0 K/UL (ref 0–0.1)
BASOPHILS NFR BLD: 0 % (ref 0–1)
BASOPHILS NFR BLD: 0 % (ref 0–1)
BENZODIAZ UR QL: NEGATIVE
BILIRUB SERPL-MCNC: 0.2 MG/DL (ref 0.2–1)
BILIRUB UR QL: NEGATIVE
BLOOD GROUP ANTIBODIES SERPL: NEGATIVE
BUN SERPL-MCNC: 9 MG/DL (ref 6–20)
BUN/CREAT SERPL: 14 (ref 12–20)
CA-I BLD-MCNC: 8 MG/DL (ref 8.5–10.1)
CANNABINOIDS UR QL SCN: NEGATIVE
CHLORIDE SERPL-SCNC: 102 MMOL/L (ref 97–108)
CO2 SERPL-SCNC: 23 MMOL/L (ref 21–32)
COCAINE UR QL SCN: NEGATIVE
COLOR UR: ABNORMAL
COVID-19 RAPID TEST, COVR: NOT DETECTED
CREAT SERPL-MCNC: 0.64 MG/DL (ref 0.55–1.02)
DIFFERENTIAL METHOD BLD: ABNORMAL
DIFFERENTIAL METHOD BLD: ABNORMAL
DRUG SCRN COMMENT,DRGCM: ABNORMAL
EOSINOPHIL # BLD: 0 K/UL (ref 0–0.4)
EOSINOPHIL # BLD: 0.1 K/UL (ref 0–0.4)
EOSINOPHIL NFR BLD: 0 % (ref 0–7)
EOSINOPHIL NFR BLD: 1 % (ref 0–7)
ERYTHROCYTE [DISTWIDTH] IN BLOOD BY AUTOMATED COUNT: 14.6 % (ref 11.5–14.5)
ERYTHROCYTE [DISTWIDTH] IN BLOOD BY AUTOMATED COUNT: 14.7 % (ref 11.5–14.5)
GLOBULIN SER CALC-MCNC: 3.3 G/DL (ref 2–4)
GLUCOSE SERPL-MCNC: 130 MG/DL (ref 65–100)
GLUCOSE UR STRIP.AUTO-MCNC: NEGATIVE MG/DL
HCT VFR BLD AUTO: 27.2 % (ref 35–47)
HCT VFR BLD AUTO: 27.7 % (ref 35–47)
HGB BLD-MCNC: 8.7 G/DL (ref 11.5–16)
HGB BLD-MCNC: 8.9 G/DL (ref 11.5–16)
HGB UR QL STRIP: NEGATIVE
IMM GRANULOCYTES # BLD AUTO: 0 K/UL (ref 0–0.04)
IMM GRANULOCYTES # BLD AUTO: 0.1 K/UL (ref 0–0.04)
IMM GRANULOCYTES NFR BLD AUTO: 0 % (ref 0–0.5)
IMM GRANULOCYTES NFR BLD AUTO: 1 % (ref 0–0.5)
KETONES UR QL STRIP.AUTO: 5 MG/DL
LEUKOCYTE ESTERASE UR QL STRIP.AUTO: ABNORMAL
LYMPHOCYTES # BLD: 1.2 K/UL (ref 0.8–3.5)
LYMPHOCYTES # BLD: 1.8 K/UL (ref 0.8–3.5)
LYMPHOCYTES NFR BLD: 10 % (ref 12–49)
LYMPHOCYTES NFR BLD: 24 % (ref 12–49)
MCH RBC QN AUTO: 27.3 PG (ref 26–34)
MCH RBC QN AUTO: 27.4 PG (ref 26–34)
MCHC RBC AUTO-ENTMCNC: 32 G/DL (ref 30–36.5)
MCHC RBC AUTO-ENTMCNC: 32.1 G/DL (ref 30–36.5)
MCV RBC AUTO: 85 FL (ref 80–99)
MCV RBC AUTO: 85.5 FL (ref 80–99)
METHADONE UR QL: NEGATIVE
MONOCYTES # BLD: 0.6 K/UL (ref 0–1)
MONOCYTES # BLD: 0.7 K/UL (ref 0–1)
MONOCYTES NFR BLD: 6 % (ref 5–13)
MONOCYTES NFR BLD: 8 % (ref 5–13)
MUCOUS THREADS URNS QL MICRO: ABNORMAL /LPF
NEUTS SEG # BLD: 10.4 K/UL (ref 1.8–8)
NEUTS SEG # BLD: 5.1 K/UL (ref 1.8–8)
NEUTS SEG NFR BLD: 67 % (ref 32–75)
NEUTS SEG NFR BLD: 83 % (ref 32–75)
NITRITE UR QL STRIP.AUTO: NEGATIVE
NRBC # BLD: 0 K/UL (ref 0–0.01)
NRBC # BLD: 0 K/UL (ref 0–0.01)
NRBC BLD-RTO: 0 PER 100 WBC
NRBC BLD-RTO: 0 PER 100 WBC
OPIATES UR QL: NEGATIVE
PCP UR QL: NEGATIVE
PH UR STRIP: 5 [PH] (ref 5–8)
PLATELET # BLD AUTO: 283 K/UL (ref 150–400)
PLATELET # BLD AUTO: 310 K/UL (ref 150–400)
PMV BLD AUTO: 10.1 FL (ref 8.9–12.9)
PMV BLD AUTO: 10.3 FL (ref 8.9–12.9)
POTASSIUM SERPL-SCNC: 4.6 MMOL/L (ref 3.5–5.1)
PROT SERPL-MCNC: 5.4 G/DL (ref 6.4–8.2)
PROT UR STRIP-MCNC: NEGATIVE MG/DL
RBC # BLD AUTO: 3.18 M/UL (ref 3.8–5.2)
RBC # BLD AUTO: 3.26 M/UL (ref 3.8–5.2)
RBC #/AREA URNS HPF: ABNORMAL /HPF (ref 0–5)
SODIUM SERPL-SCNC: 133 MMOL/L (ref 136–145)
SP GR UR REFRACTOMETRY: 1.02 (ref 1–1.03)
SPECIMEN EXP DATE BLD: NORMAL
SPECIMEN SOURCE: NORMAL
URATE SERPL-MCNC: 4.3 MG/DL (ref 2.6–6)
UROBILINOGEN UR QL STRIP.AUTO: 2 EU/DL (ref 0.1–1)
WBC # BLD AUTO: 12.3 K/UL (ref 3.6–11)
WBC # BLD AUTO: 7.5 K/UL (ref 3.6–11)
WBC URNS QL MICRO: ABNORMAL /HPF (ref 0–4)

## 2021-09-22 PROCEDURE — 81001 URINALYSIS AUTO W/SCOPE: CPT

## 2021-09-22 PROCEDURE — 76060000033 HC ANESTHESIA 1 TO 1.5 HR: Performed by: OBSTETRICS & GYNECOLOGY

## 2021-09-22 PROCEDURE — 87086 URINE CULTURE/COLONY COUNT: CPT

## 2021-09-22 PROCEDURE — 74011250636 HC RX REV CODE- 250/636: Performed by: OBSTETRICS & GYNECOLOGY

## 2021-09-22 PROCEDURE — 59510 CESAREAN DELIVERY: CPT | Performed by: OBSTETRICS & GYNECOLOGY

## 2021-09-22 PROCEDURE — 74011000250 HC RX REV CODE- 250: Performed by: OBSTETRICS & GYNECOLOGY

## 2021-09-22 PROCEDURE — 83735 ASSAY OF MAGNESIUM: CPT

## 2021-09-22 PROCEDURE — 10907ZC DRAINAGE OF AMNIOTIC FLUID, THERAPEUTIC FROM PRODUCTS OF CONCEPTION, VIA NATURAL OR ARTIFICIAL OPENING: ICD-10-PCS | Performed by: OBSTETRICS & GYNECOLOGY

## 2021-09-22 PROCEDURE — 74011250637 HC RX REV CODE- 250/637: Performed by: OBSTETRICS & GYNECOLOGY

## 2021-09-22 PROCEDURE — 36415 COLL VENOUS BLD VENIPUNCTURE: CPT

## 2021-09-22 PROCEDURE — 76010000392 HC C SECN EA ADDL 0.5 HR: Performed by: OBSTETRICS & GYNECOLOGY

## 2021-09-22 PROCEDURE — 74011000250 HC RX REV CODE- 250

## 2021-09-22 PROCEDURE — 76060000078 HC EPIDURAL ANESTHESIA: Performed by: OBSTETRICS & GYNECOLOGY

## 2021-09-22 PROCEDURE — 85025 COMPLETE CBC W/AUTO DIFF WBC: CPT

## 2021-09-22 PROCEDURE — 74011250636 HC RX REV CODE- 250/636

## 2021-09-22 PROCEDURE — 76010000391 HC C SECN FIRST 1 HR: Performed by: OBSTETRICS & GYNECOLOGY

## 2021-09-22 PROCEDURE — 74011000258 HC RX REV CODE- 258: Performed by: OBSTETRICS & GYNECOLOGY

## 2021-09-22 PROCEDURE — 65410000002 HC RM PRIVATE OB

## 2021-09-22 PROCEDURE — 87389 HIV-1 AG W/HIV-1&-2 AB AG IA: CPT

## 2021-09-22 PROCEDURE — 74011250636 HC RX REV CODE- 250/636: Performed by: NURSE ANESTHETIST, CERTIFIED REGISTERED

## 2021-09-22 PROCEDURE — 80053 COMPREHEN METABOLIC PANEL: CPT

## 2021-09-22 PROCEDURE — 74011250636 HC RX REV CODE- 250/636: Performed by: ANESTHESIOLOGY

## 2021-09-22 PROCEDURE — 74011000250 HC RX REV CODE- 250: Performed by: ANESTHESIOLOGY

## 2021-09-22 PROCEDURE — 80307 DRUG TEST PRSMV CHEM ANLYZR: CPT

## 2021-09-22 PROCEDURE — 86592 SYPHILIS TEST NON-TREP QUAL: CPT

## 2021-09-22 PROCEDURE — 86901 BLOOD TYPING SEROLOGIC RH(D): CPT

## 2021-09-22 PROCEDURE — 87635 SARS-COV-2 COVID-19 AMP PRB: CPT

## 2021-09-22 PROCEDURE — 84550 ASSAY OF BLOOD/URIC ACID: CPT

## 2021-09-22 RX ORDER — SODIUM CHLORIDE, SODIUM LACTATE, POTASSIUM CHLORIDE, CALCIUM CHLORIDE 600; 310; 30; 20 MG/100ML; MG/100ML; MG/100ML; MG/100ML
125 INJECTION, SOLUTION INTRAVENOUS CONTINUOUS
Status: DISCONTINUED | OUTPATIENT
Start: 2021-09-22 | End: 2021-09-23

## 2021-09-22 RX ORDER — OXYCODONE AND ACETAMINOPHEN 5; 325 MG/1; MG/1
1 TABLET ORAL
Status: DISCONTINUED | OUTPATIENT
Start: 2021-09-22 | End: 2021-09-24 | Stop reason: HOSPADM

## 2021-09-22 RX ORDER — SODIUM CHLORIDE, SODIUM LACTATE, POTASSIUM CHLORIDE, CALCIUM CHLORIDE 600; 310; 30; 20 MG/100ML; MG/100ML; MG/100ML; MG/100ML
125 INJECTION, SOLUTION INTRAVENOUS CONTINUOUS
Status: DISCONTINUED | OUTPATIENT
Start: 2021-09-22 | End: 2021-09-22

## 2021-09-22 RX ORDER — DESVENLAFAXINE 50 MG/1
100 TABLET, EXTENDED RELEASE ORAL
Status: DISCONTINUED | OUTPATIENT
Start: 2021-09-22 | End: 2021-09-24 | Stop reason: HOSPADM

## 2021-09-22 RX ORDER — IBUPROFEN 800 MG/1
800 TABLET ORAL EVERY 8 HOURS
Status: DISCONTINUED | OUTPATIENT
Start: 2021-09-22 | End: 2021-09-24 | Stop reason: HOSPADM

## 2021-09-22 RX ORDER — ONDANSETRON 2 MG/ML
INJECTION INTRAMUSCULAR; INTRAVENOUS AS NEEDED
Status: DISCONTINUED | OUTPATIENT
Start: 2021-09-22 | End: 2021-09-22 | Stop reason: HOSPADM

## 2021-09-22 RX ORDER — OXYTOCIN/RINGER'S LACTATE 20/1000 ML
PLASTIC BAG, INJECTION (ML) INTRAVENOUS
Status: DISCONTINUED | OUTPATIENT
Start: 2021-09-22 | End: 2021-09-22 | Stop reason: HOSPADM

## 2021-09-22 RX ORDER — LABETALOL 200 MG/1
200 TABLET, FILM COATED ORAL 2 TIMES DAILY
Status: DISCONTINUED | OUTPATIENT
Start: 2021-09-22 | End: 2021-09-22

## 2021-09-22 RX ORDER — OXYTOCIN/RINGER'S LACTATE 30/500 ML
10 PLASTIC BAG, INJECTION (ML) INTRAVENOUS AS NEEDED
Status: DISCONTINUED | OUTPATIENT
Start: 2021-09-22 | End: 2021-09-22

## 2021-09-22 RX ORDER — ONDANSETRON 2 MG/ML
INJECTION INTRAMUSCULAR; INTRAVENOUS
Status: COMPLETED
Start: 2021-09-22 | End: 2021-09-22

## 2021-09-22 RX ORDER — TRAZODONE HYDROCHLORIDE 50 MG/1
100 TABLET ORAL
Status: DISCONTINUED | OUTPATIENT
Start: 2021-09-22 | End: 2021-09-24 | Stop reason: HOSPADM

## 2021-09-22 RX ORDER — CEFAZOLIN SODIUM 1 G/3ML
INJECTION, POWDER, FOR SOLUTION INTRAMUSCULAR; INTRAVENOUS
Status: DISCONTINUED
Start: 2021-09-22 | End: 2021-09-22

## 2021-09-22 RX ORDER — DEXAMETHASONE SODIUM PHOSPHATE 4 MG/ML
INJECTION, SOLUTION INTRA-ARTICULAR; INTRALESIONAL; INTRAMUSCULAR; INTRAVENOUS; SOFT TISSUE AS NEEDED
Status: DISCONTINUED | OUTPATIENT
Start: 2021-09-22 | End: 2021-09-22 | Stop reason: HOSPADM

## 2021-09-22 RX ORDER — LABETALOL 200 MG/1
200 TABLET, FILM COATED ORAL 3 TIMES DAILY
Status: DISCONTINUED | OUTPATIENT
Start: 2021-09-22 | End: 2021-09-24 | Stop reason: HOSPADM

## 2021-09-22 RX ORDER — CALCIUM GLUCONATE 20 MG/ML
1 INJECTION, SOLUTION INTRAVENOUS AS NEEDED
Status: DISCONTINUED | OUTPATIENT
Start: 2021-09-22 | End: 2021-09-24 | Stop reason: HOSPADM

## 2021-09-22 RX ORDER — METOCLOPRAMIDE HYDROCHLORIDE 5 MG/ML
INJECTION INTRAMUSCULAR; INTRAVENOUS AS NEEDED
Status: DISCONTINUED | OUTPATIENT
Start: 2021-09-22 | End: 2021-09-22 | Stop reason: HOSPADM

## 2021-09-22 RX ORDER — OXYTOCIN/RINGER'S LACTATE 30/500 ML
10 PLASTIC BAG, INJECTION (ML) INTRAVENOUS AS NEEDED
Status: DISCONTINUED | OUTPATIENT
Start: 2021-09-22 | End: 2021-09-24 | Stop reason: HOSPADM

## 2021-09-22 RX ORDER — MORPHINE SULFATE 0.5 MG/ML
INJECTION, SOLUTION EPIDURAL; INTRATHECAL; INTRAVENOUS
Status: SHIPPED | OUTPATIENT
Start: 2021-09-22 | End: 2021-09-22

## 2021-09-22 RX ORDER — SIMETHICONE 80 MG
80 TABLET,CHEWABLE ORAL AS NEEDED
Status: DISCONTINUED | OUTPATIENT
Start: 2021-09-22 | End: 2021-09-24 | Stop reason: HOSPADM

## 2021-09-22 RX ORDER — OXYCODONE AND ACETAMINOPHEN 5; 325 MG/1; MG/1
1 TABLET ORAL
Qty: 30 TABLET | Refills: 0 | Status: SHIPPED | OUTPATIENT
Start: 2021-09-22 | End: 2021-10-06

## 2021-09-22 RX ORDER — MAGNESIUM SULFATE HEPTAHYDRATE 40 MG/ML
2 INJECTION, SOLUTION INTRAVENOUS CONTINUOUS
Status: DISCONTINUED | OUTPATIENT
Start: 2021-09-23 | End: 2021-09-23

## 2021-09-22 RX ORDER — SODIUM CHLORIDE, SODIUM LACTATE, POTASSIUM CHLORIDE, CALCIUM CHLORIDE 600; 310; 30; 20 MG/100ML; MG/100ML; MG/100ML; MG/100ML
1000 INJECTION, SOLUTION INTRAVENOUS CONTINUOUS
Status: DISCONTINUED | OUTPATIENT
Start: 2021-09-22 | End: 2021-09-22

## 2021-09-22 RX ORDER — OXYTOCIN/RINGER'S LACTATE 30/500 ML
87.3 PLASTIC BAG, INJECTION (ML) INTRAVENOUS AS NEEDED
Status: COMPLETED | OUTPATIENT
Start: 2021-09-22 | End: 2021-09-22

## 2021-09-22 RX ORDER — SODIUM CHLORIDE 0.9 % (FLUSH) 0.9 %
5-40 SYRINGE (ML) INJECTION AS NEEDED
Status: DISCONTINUED | OUTPATIENT
Start: 2021-09-22 | End: 2021-09-22

## 2021-09-22 RX ORDER — ONDANSETRON 2 MG/ML
4 INJECTION INTRAMUSCULAR; INTRAVENOUS
Status: DISCONTINUED | OUTPATIENT
Start: 2021-09-22 | End: 2021-09-24 | Stop reason: HOSPADM

## 2021-09-22 RX ORDER — ZOLPIDEM TARTRATE 5 MG/1
5 TABLET ORAL
Status: DISCONTINUED | OUTPATIENT
Start: 2021-09-22 | End: 2021-09-24 | Stop reason: HOSPADM

## 2021-09-22 RX ORDER — NALOXONE HYDROCHLORIDE 0.4 MG/ML
0.4 INJECTION, SOLUTION INTRAMUSCULAR; INTRAVENOUS; SUBCUTANEOUS AS NEEDED
Status: DISCONTINUED | OUTPATIENT
Start: 2021-09-22 | End: 2021-09-24 | Stop reason: HOSPADM

## 2021-09-22 RX ORDER — PANTOPRAZOLE SODIUM 40 MG/1
40 TABLET, DELAYED RELEASE ORAL DAILY
Status: DISCONTINUED | OUTPATIENT
Start: 2021-09-22 | End: 2021-09-23 | Stop reason: SDUPTHER

## 2021-09-22 RX ORDER — SERTRALINE HYDROCHLORIDE 50 MG/1
50 TABLET, FILM COATED ORAL EVERY EVENING
Status: DISCONTINUED | OUTPATIENT
Start: 2021-09-22 | End: 2021-09-23

## 2021-09-22 RX ORDER — LABETALOL 100 MG/1
100 TABLET, FILM COATED ORAL 2 TIMES DAILY
Status: DISCONTINUED | OUTPATIENT
Start: 2021-09-22 | End: 2021-09-22

## 2021-09-22 RX ORDER — OXYTOCIN/RINGER'S LACTATE 30/500 ML
PLASTIC BAG, INJECTION (ML) INTRAVENOUS
Status: COMPLETED
Start: 2021-09-22 | End: 2021-09-22

## 2021-09-22 RX ORDER — MAGNESIUM SULFATE HEPTAHYDRATE 40 MG/ML
4 INJECTION, SOLUTION INTRAVENOUS ONCE
Status: COMPLETED | OUTPATIENT
Start: 2021-09-22 | End: 2021-09-22

## 2021-09-22 RX ORDER — IBUPROFEN 800 MG/1
800 TABLET ORAL EVERY 8 HOURS
Status: DISCONTINUED | OUTPATIENT
Start: 2021-09-22 | End: 2021-09-22

## 2021-09-22 RX ORDER — BUPIVACAINE HYDROCHLORIDE 5 MG/ML
INJECTION, SOLUTION EPIDURAL; INTRACAUDAL
Status: SHIPPED | OUTPATIENT
Start: 2021-09-22 | End: 2021-09-22

## 2021-09-22 RX ORDER — LABETALOL HYDROCHLORIDE 5 MG/ML
20 INJECTION, SOLUTION INTRAVENOUS ONCE
Status: COMPLETED | OUTPATIENT
Start: 2021-09-22 | End: 2021-09-22

## 2021-09-22 RX ORDER — LABETALOL HYDROCHLORIDE 5 MG/ML
INJECTION, SOLUTION INTRAVENOUS
Status: COMPLETED
Start: 2021-09-22 | End: 2021-09-22

## 2021-09-22 RX ORDER — SODIUM CHLORIDE 0.9 % (FLUSH) 0.9 %
5-40 SYRINGE (ML) INJECTION EVERY 8 HOURS
Status: DISCONTINUED | OUTPATIENT
Start: 2021-09-22 | End: 2021-09-22

## 2021-09-22 RX ORDER — DEXTROAMPHETAMINE SACCHARATE, AMPHETAMINE ASPARTATE, DEXTROAMPHETAMINE SULFATE AND AMPHETAMINE SULFATE 2.5; 2.5; 2.5; 2.5 MG/1; MG/1; MG/1; MG/1
10 TABLET ORAL 3 TIMES DAILY
Status: DISCONTINUED | OUTPATIENT
Start: 2021-09-23 | End: 2021-09-24 | Stop reason: HOSPADM

## 2021-09-22 RX ORDER — SODIUM CHLORIDE, SODIUM LACTATE, POTASSIUM CHLORIDE, CALCIUM CHLORIDE 600; 310; 30; 20 MG/100ML; MG/100ML; MG/100ML; MG/100ML
INJECTION, SOLUTION INTRAVENOUS
Status: DISCONTINUED | OUTPATIENT
Start: 2021-09-22 | End: 2021-09-22 | Stop reason: HOSPADM

## 2021-09-22 RX ORDER — OXYTOCIN/RINGER'S LACTATE 30/500 ML
87.3 PLASTIC BAG, INJECTION (ML) INTRAVENOUS AS NEEDED
Status: DISCONTINUED | OUTPATIENT
Start: 2021-09-22 | End: 2021-09-22

## 2021-09-22 RX ORDER — KETOROLAC TROMETHAMINE 30 MG/ML
INJECTION, SOLUTION INTRAMUSCULAR; INTRAVENOUS AS NEEDED
Status: DISCONTINUED | OUTPATIENT
Start: 2021-09-22 | End: 2021-09-22 | Stop reason: HOSPADM

## 2021-09-22 RX ADMIN — ONDANSETRON 4 MG: 2 INJECTION INTRAMUSCULAR; INTRAVENOUS at 17:00

## 2021-09-22 RX ADMIN — IBUPROFEN 800 MG: 800 TABLET, FILM COATED ORAL at 21:02

## 2021-09-22 RX ADMIN — PROMETHAZINE HYDROCHLORIDE 12.5 MG: 25 INJECTION INTRAMUSCULAR; INTRAVENOUS at 20:07

## 2021-09-22 RX ADMIN — LABETALOL HYDROCHLORIDE 20 MG: 5 INJECTION INTRAVENOUS at 15:57

## 2021-09-22 RX ADMIN — SODIUM CHLORIDE, POTASSIUM CHLORIDE, SODIUM LACTATE AND CALCIUM CHLORIDE: 600; 310; 30; 20 INJECTION, SOLUTION INTRAVENOUS at 13:06

## 2021-09-22 RX ADMIN — SODIUM CHLORIDE, POTASSIUM CHLORIDE, SODIUM LACTATE AND CALCIUM CHLORIDE 125 ML/HR: 600; 310; 30; 20 INJECTION, SOLUTION INTRAVENOUS at 14:54

## 2021-09-22 RX ADMIN — LABETALOL HYDROCHLORIDE 200 MG: 200 TABLET, FILM COATED ORAL at 22:32

## 2021-09-22 RX ADMIN — LABETALOL HYDROCHLORIDE 200 MG: 200 TABLET, FILM COATED ORAL at 18:43

## 2021-09-22 RX ADMIN — DESVENLAFAXINE 100 MG: 50 TABLET, EXTENDED RELEASE ORAL at 22:32

## 2021-09-22 RX ADMIN — Medication 87.3 MILLI-UNITS/MIN: at 14:54

## 2021-09-22 RX ADMIN — SODIUM CHLORIDE, POTASSIUM CHLORIDE, SODIUM LACTATE AND CALCIUM CHLORIDE 1000 ML: 600; 310; 30; 20 INJECTION, SOLUTION INTRAVENOUS at 09:00

## 2021-09-22 RX ADMIN — SODIUM CHLORIDE 40 MCG/MIN: 9 INJECTION, SOLUTION INTRAVENOUS at 13:22

## 2021-09-22 RX ADMIN — Medication 10 UNITS/HR: at 13:43

## 2021-09-22 RX ADMIN — SODIUM CHLORIDE, POTASSIUM CHLORIDE, SODIUM LACTATE AND CALCIUM CHLORIDE: 600; 310; 30; 20 INJECTION, SOLUTION INTRAVENOUS at 13:27

## 2021-09-22 RX ADMIN — ONDANSETRON 4 MG: 2 INJECTION INTRAMUSCULAR; INTRAVENOUS at 13:06

## 2021-09-22 RX ADMIN — MAGNESIUM SULFATE HEPTAHYDRATE 4 G: 40 INJECTION, SOLUTION INTRAVENOUS at 17:07

## 2021-09-22 RX ADMIN — MORPHINE SULFATE 0.2 MG: 0.5 INJECTION, SOLUTION EPIDURAL; INTRATHECAL; INTRAVENOUS at 13:21

## 2021-09-22 RX ADMIN — KETOROLAC TROMETHAMINE 30 MG: 30 INJECTION, SOLUTION INTRAMUSCULAR at 13:56

## 2021-09-22 RX ADMIN — SODIUM CHLORIDE, POTASSIUM CHLORIDE, SODIUM LACTATE AND CALCIUM CHLORIDE 75 ML/HR: 600; 310; 30; 20 INJECTION, SOLUTION INTRAVENOUS at 17:00

## 2021-09-22 RX ADMIN — MAGNESIUM SULFATE HEPTAHYDRATE 2 G/HR: 40 INJECTION, SOLUTION INTRAVENOUS at 17:33

## 2021-09-22 RX ADMIN — TRAZODONE HYDROCHLORIDE 100 MG: 50 TABLET ORAL at 21:04

## 2021-09-22 RX ADMIN — LABETALOL HYDROCHLORIDE 20 MG: 5 INJECTION, SOLUTION INTRAVENOUS at 15:57

## 2021-09-22 RX ADMIN — DEXAMETHASONE SODIUM PHOSPHATE 4 MG: 4 INJECTION, SOLUTION INTRA-ARTICULAR; INTRALESIONAL; INTRAMUSCULAR; INTRAVENOUS; SOFT TISSUE at 13:06

## 2021-09-22 RX ADMIN — CEFAZOLIN SODIUM 2 G: 1 INJECTION, POWDER, FOR SOLUTION INTRAMUSCULAR; INTRAVENOUS at 13:06

## 2021-09-22 RX ADMIN — METOCLOPRAMIDE HYDROCHLORIDE 10 MG: 5 INJECTION INTRAMUSCULAR; INTRAVENOUS at 13:06

## 2021-09-22 RX ADMIN — SERTRALINE HYDROCHLORIDE 50 MG: 50 TABLET ORAL at 21:03

## 2021-09-22 RX ADMIN — PANTOPRAZOLE SODIUM 40 MG: 40 TABLET, DELAYED RELEASE ORAL at 21:03

## 2021-09-22 RX ADMIN — BUPIVACAINE HYDROCHLORIDE 1.6 MG: 5 INJECTION, SOLUTION EPIDURAL; INTRACAUDAL; PERINEURAL at 13:21

## 2021-09-22 NOTE — ANESTHESIA POSTPROCEDURE EVALUATION
Procedure(s):   SECTION.     spinal    Anesthesia Post Evaluation        Patient location during evaluation: PACU  Patient participation: complete - patient participated  Level of consciousness: awake  Pain score: 0  Pain management: adequate  Airway patency: patent  Anesthetic complications: no  Cardiovascular status: acceptable  Respiratory status: acceptable  Hydration status: acceptable  Post anesthesia nausea and vomiting:  controlled  Final Post Anesthesia Temperature Assessment:  Normothermia (36.0-37.5 degrees C)      INITIAL Post-op Vital signs:   Vitals Value Taken Time   /94 21 1619   Temp 36.9 °C (98.4 °F) 21 1425   Pulse 55 21 1619   Resp 16 21 1425   SpO2 100 % 21 1447

## 2021-09-22 NOTE — PROGRESS NOTES
6794: Patient arrived via ambulatory with her mother. Escorted to room 3 at this time. Asked patient to change into gown and leave urine sample if able to do so. Patient denies vaginal bleeding, leaking/gushing of fluids, visual disturbances, headache, trauma to her abdomen, shortness of breath, or abdominal pain. 4016: EFM tracing initiated at this time. Triage and admission questions/assessment started. 0830: Explained to patient plan of care for the morning; COVID swab obtained. Mother is going to go to the OR with the patient while her  stays in room; visitor policies explained. Patient verbalized understanding. Call bell within reach. 0930: Patient resting comfortably; spouse and mother at bedside. Patient has no complaints at this time. 1010: Dr. Serina Arce at bedside; updated patient that her section will be at 1300. Patient understanding. 1105: Patient up to bathroom; patient has no complaints at this time. 1117: Called the lab to check the status of the UDS that was ordered at 0830. Spoke to the lady from chemistry; they are to run the UDS at this time. 1145: Patient sitting up working in bed. Patient has no complaints; patient did take Labetalol PO this morning. Will continue with plan of care. 1304: Patient discontinued from EFM to move to the OR.     1306: Patient in OR for procedure. See delivery and OR summary for further details. 1425: Patient leaving the OR at this time to go back to the room    1427: Patient back to the room; patient's mother and spouse at bedside. Patient denies pain at this time-mother to do skin to skin with baby now; told patient I would be back in 15-20 minutes to assess her. Patient verbalized understanding. 1545: Dr. Serina Arce aware of patient's blood pressures. Blood pressures reviewed with MD. Labetalol 20mg IVP ordered and placed. 1619: Dr. Serina Arce at bedside discussing blood pressures as well as initiating Magnesium Sulfate.  Side effects discussed with patient of medication. Patient very understanding and verbalized such. 1707: Magnesium bolus started at this time. Pitocin discontinued as patient's bleeding is scant; Dr. Annie Wakefield made aware. Patient's pad changed. Patient further educated on [address] and it's side effects. Emesis bag given to patient. Patient continues to deny pain at this time. Educated patient to keep the plse ox on as well need continuous monitoring. Informed that writer would be back in to start continuous infusion once the bolus was completed. Patient verbalized understanding. 1715: Dr. Annie Wakefield updated on patient's blood pressures; orders to increase Labetalol to 200mg BID. Orders placed. 1800: Patient denies pain; just c/o nausea which Zofran was giving. Patient vomited 100cc. New emesis bag given. Call bell within reach. 1815: IVPB Phenergan ordered and placed. 1830: Dr. Annie Wakefield made aware of patient's blood pressures; increasing Labetalol to TID to include now dose. Patient aware. When asking patient admission questions this morning, patient mentioned she did not receive her Tdap during pregnancy, but would like to receive it. Asked Dr. Annie Wakefield now and order was received and placed. 1900: Bedside shift report given to oncoming nightshift nurse.

## 2021-09-22 NOTE — ANESTHESIA PREPROCEDURE EVALUATION
Relevant Problems   NEUROLOGY   (+) Attention deficit disorder (ADD) in adult   (+) Depression      GASTROINTESTINAL   (+) Gastroesophageal reflux disease without esophagitis       Anesthetic History   No history of anesthetic complications            Review of Systems / Medical History  Patient summary reviewed, nursing notes reviewed and pertinent labs reviewed    Pulmonary            Asthma        Neuro/Psych         Psychiatric history     Cardiovascular    Hypertension                   GI/Hepatic/Renal     GERD           Endo/Other  Within defined limits           Other Findings   Comments: COVID-19 Vaccine:   Unknown  Allergies  No Known Allergies  Ht: 5' 8\" (172.7 cm)  Weight: 84.4 kg (186 lb)  BMI: 28.28 kg/m²  No watch meds found  CrCl: 116.7 mL/min  Procedure   SECTION (N/A Abdomen)  Scheduled, [facility] L&D OR 01      Medical History  Psychiatric disorder  Gastrointestinal disorder  GERD (gastroesophageal reflux disease)  Ill-defined condition  Asthma  Depression  ADHD  Essential hypertension           Physical Exam    Airway  Mallampati: II  TM Distance: 4 - 6 cm  Neck ROM: normal range of motion   Mouth opening: Normal     Cardiovascular    Rhythm: regular  Rate: normal         Dental  No notable dental hx       Pulmonary  Breath sounds clear to auscultation               Abdominal  GI exam deferred       Other Findings   Comments: Results for Young Christensen (MRN [id number]) as of 2021 11:55    2021 08:32  WBC: 7.5  NRBC: 0.0  RBC: 3.26 (L)  HGB: 8.9 (L)  HCT: 27.7 (L)  MCV: 85.0  MCH: 27.3  MCHC: 32.1  RDW: 14.7 (H)  PLATELET: 275  MPV: 96.3  NEUTROPHILS: 67  LYMPHOCYTES: 24  MONOCYTES: 8  EOSINOPHILS: 1  BASOPHILS: 0  IMMATURE GRANULOCYTES: 0  DF: AUTOMATED  ABSOLUTE NRBC: 0.00  ABS. NEUTROPHILS: 5.1  ABS. IMM. GRANS.: 0.0  ABS. LYMPHOCYTES: 1.8  ABS. MONOCYTES: 0.6  ABS. EOSINOPHILS: 0.1  ABS.  BASOPHILS: 0.0         Anesthetic Plan    ASA: 2  Anesthesia type: spinal      Post-op pain plan if not by surgeon: intrathecal opiates      Anesthetic plan and risks discussed with: Patient and Family      Benefits and risks of spinal anesthesia discussed with patient/family. All questions answered.

## 2021-09-22 NOTE — OP NOTES
Section Delivery Procedure Note         Name: [address] [place] [address] Record Number: [id number]      YOB: 1975     Today's Date: 2021      Preoperative Diagnosis: Single delivery by  [[id number]]    Postoperative Diagnosis: Delivered 39w2d  section    Procedure: Low Cervical Transverse Procedure(s):   SECTION    Surgeon(s):  uBll Ta MD    Anesthesia: Spinal    Prophylactic Antibiotics: Ancef         Fetal Description: lockwood male    Birth Information:   Information for the patient's :  Swati Lopez [[id number]]          Umbilical Cord: Cord blood sent to lab for type, Rh, and Mp' test and cord around right elbow/ arm    Placenta:  expressed    Specimens:   ID Type Source Tests Collected by Time Destination   1 :  Blood Umbilical cord  Bull Ta MD 2021 9090 Pathology               Complications:  none    Procedure Details: The patient was taken to the operating room, where spinal anesthesia was administered and found to be adequate. Covington catheter had been placed using sterile technique. The patient was prepped and draped in the normal sterile fashion. The abdomen was entered using the Pfannenstiel technique. The peritoneum was entered sharply well superior to the bladder. The bladder blade was then inserted. The vesicouterine and peritoneum was identified and entered sharply with Metzenbaum scissors. The bladder flap was then created sharply and the bladder blade was reinserted. A low transverse uterine incision was made with the scalpel and extended laterally with blunt finger dissection. Amniotomy was performed and the fluid was medium amount clear. The babys head was then delivered atraumatically. The nose and mouth were suctioned. The cord was clamped and cut and the baby was handed off to the waiting  care unit staff. Placenta was then expressed from the uterus.  The uterus was exteriorized and cleared of all clots and debris. The uterine incision was closed with number 1 Chromic in running locking fashion with good hemostasis assured. Second suture used for hemostasis. The posterior cul-de-sac was irrigated with warm normal saline. Good hemostasis was again reassured and the uterus was returned to the abdomen. The anterior pelvis was irrigated with warm normal saline and good hemostasis was again reassured throughout. The rectus muscles were reapproximated in the midline with a series of simple stitches with 0 chromic. The fascia was closed with 0 Vicryl in a running fashion. Good hemostasis was assured. The subcuticular layers were reapproximated with 2-0 plain gut in interrupted fashion. The skin was closed with a 3-0 Vicryl in a subcuticular fashion. Dermabond was applied. The patient tolerated the procedure well. Sponge, lap, and needle counts were correct times three and the patient and baby were taken to recovery/postpartum room in stable condition.     Signed: Mervat Espinal MD      September 22, 2021

## 2021-09-22 NOTE — H&P
History & Physical    Name: Kaylen Kim MRN: [id number]  SSN: [id number]    YOB: 1975  Age: 42 y.o. Sex: female        Subjective:     Estimated Date of Delivery: 21  OB History    Para Term  AB Living   2       1     SAB TAB Ectopic Molar Multiple Live Births                    # Outcome Date GA Lbr Douglas/2nd Weight Sex Delivery Anes PTL Lv   2 Current            1 AB                Ms. Jesús Turner is admitted with pregnancy at 39w2d for  Section. Prenatal course was complicated by advanced maternal age and chronic hypertension. Please see prenatal records for details. Past Medical History:   Diagnosis Date    ADHD     Asthma     Depression     Essential hypertension     Gastrointestinal disorder     GERD (gastroesophageal reflux disease)     Ill-defined condition     ADD    Psychiatric disorder      Past Surgical History:   Procedure Laterality Date    HX CHOLECYSTECTOMY  2019    Robotic-assisted laparoscopic cholecystectomy with firefly and IOC.  HX GI      gastric sleeve    HX HEENT      wisdom teeth    HX OTHER SURGICAL       Social History     Occupational History    Not on file   Tobacco Use    Smoking status: Former Smoker     Packs/day: 0.25     Years: 3.00     Pack years: 0.75     Quit date: 2014     Years since quittin.4    Smokeless tobacco: Never Used   Vaping Use    Vaping Use: Never assessed   Substance and Sexual Activity    Alcohol use: Not Currently     Alcohol/week: 5.0 standard drinks     Types: 5 Glasses of wine per week    Drug use: Not Currently    Sexual activity: Yes     Partners: Male     Birth control/protection: None     Family History   Problem Relation Age of Onset    Diabetes Neg Hx     Hypertension Neg Hx        No Known Allergies  Prior to Admission medications    Medication Sig Start Date End Date Taking? Authorizing Provider   sertraline (ZOLOFT) 50 mg tablet Take 1 Tab by mouth daily. Indications: major depressive disorder 4/2/21  Yes Boogie Soliman MD   labetaloL (NORMODYNE) 100 mg tablet Take 2 Tabs by mouth daily. Indications: high blood pressure 4/2/21  Yes Boogie Soliman MD   Desvenlafaxine (Pristiq) 100 mg Tb24 Take 150 Tabs by mouth daily. Yes Provider, Historical   traZODone (DESYREL) 100 mg tablet Take 100 mg by mouth nightly. Yes Provider, Historical   prenatal vit-iron fumarate-fa 27 mg iron- 0.8 mg tab tablet Take 1 Tab by mouth daily. 3/4/21  Yes Boogie Soliman MD   dextroamphetamine-amphetamine (ADDERALL) 10 mg tablet Take  by mouth four (4) times daily. Yes Ferdinand, MD Johana   omeprazole (PRILOSEC) 10 mg capsule Take 40 mg by mouth daily. Yes Ferdinand, MD Johana   nitrofurantoin, macrocrystal-monohydrate, (Macrobid) 100 mg capsule Take 1 Capsule by mouth two (2) times a day. Patient not taking: Reported on 8/5/2021 7/6/21   Boogie Soliman MD   aspirin 81 mg chewable tablet Take 1 Tab by mouth daily. Patient not taking: Reported on 9/22/2021 5/9/21   Boogie Soliman MD   butalbital-acetaminophen-caffeine (FIORICET, ESGIC) -40 mg per tablet Take 1 Tab by mouth every four (4) hours as needed for Headache. Patient not taking: Reported on 8/5/2021 3/12/21   Boogie Soliman MD   magnesium oxide (MAG-OX) 400 mg tablet Take 1 Tab by mouth daily. Patient not taking: Reported on 9/22/2021 3/4/21   Boogie Soliman MD   acetaminophen (TYLENOL) 325 mg tablet Take 2 Tabs by mouth every four (4) hours as needed for Pain or Fever. Patient not taking: Reported on 8/25/2021 4/12/19   Cleveland Moody MD   FLUoxetine (PROZAC) 10 mg capsule Take 10 mg by mouth nightly. Patient not taking: Reported on 8/5/2021    Other, MD Johana        Review of Systems: A comprehensive review of systems was negative except for that written in the HPI.     Objective:     Vitals:  Vitals:    09/22/21 1046 09/22/21 1101 09/22/21 1116 09/22/21 1132   BP: (!) 143/88 (!) 141/83 (!) 150/89 (!) 106/43 Pulse: 71 66 73 77   Weight:       Height:            Physical Exam:  Patient without distress. Abdomen: soft, nontender  Fundus: soft and non tender  Perineum: blood absent, amniotic fluid absent  Membranes:  Intact  Fetal Heart Rate: Reactive    Prenatal Labs:   Lab Results   Component Value Date/Time    ABO/Rh(D) O Positive 2021 08:32 AM         Assessment/Plan:     Active Problems:    Pregnancy (2021)         Plan: Admit for Reassuring fetal status, Proceed with  Section Reassuring fetal status Risks of bleeding, infection, bladder and bowel damage explained to patient. They understand the situation and consent to the  delivery. .  Group B Strep was negative.

## 2021-09-22 NOTE — ANESTHESIA PROCEDURE NOTES
Spinal Block    Start time: 9/22/2021 1:13 PM  End time: 9/22/2021 1:17 PM  Performed by: Sigrid Moore CRNA  Authorized by: Yasmeen Gonzales MD     Pre-procedure:   Indications: at surgeon's request, post-op pain management, procedure for pain and primary anesthetic  Preanesthetic Checklist: patient identified, risks and benefits discussed, anesthesia consent, site marked, patient being monitored and timeout performed    Timeout Time: 13:11 EDT          Spinal Block:   Patient Position:  Seated  Prep Region:  Lumbar  Prep: chlorhexidine      Location:  L3-4  Technique:  Single shot    Local Dose (mL):  3    Needle:   Needle Type:  Pencil-tip  Needle Gauge:  25 G  Attempts:  1      Events: CSF confirmed, no blood with aspiration and no paresthesia        Assessment:  Insertion:  Uncomplicated  Patient tolerance:  Patient tolerated the procedure well with no immediate complications

## 2021-09-22 NOTE — PROGRESS NOTES
1905  BS report received from PATITO Nicole RN. Writer asked Dr. Gibran Porter if she wanted pt's home meds restarted. Orders received. Pt alert and oriented, without distress noted at this time. 2 visitors present at this time. 1915  Writer clarified with Dr. Gibran Porter DTR nd clonus assessment. Dr. Gibran Porter states, \"Every 4 hours with assessments. Let pt sleep. \"  Dr. Gibran Porter also made aware of last urine output of 25 ml. No new order received. 2030  Pharmacy notified to please verify and send new medications ordered; due at 2000 and 2200.

## 2021-09-23 PROBLEM — O11.9 CHRONIC HYPERTENSION WITH SUPERIMPOSED PREECLAMPSIA: Status: ACTIVE | Noted: 2021-09-23

## 2021-09-23 PROBLEM — Z3A.32 32 WEEKS GESTATION OF PREGNANCY: Status: RESOLVED | Noted: 2021-08-05 | Resolved: 2021-09-23

## 2021-09-23 LAB
ALBUMIN SERPL-MCNC: 1.9 G/DL (ref 3.5–5)
ALBUMIN/GLOB SERPL: 0.5 {RATIO} (ref 1.1–2.2)
ALP SERPL-CCNC: 116 U/L (ref 45–117)
ALT SERPL-CCNC: 11 U/L (ref 12–78)
ANION GAP SERPL CALC-SCNC: 6 MMOL/L (ref 5–15)
AST SERPL W P-5'-P-CCNC: 21 U/L (ref 15–37)
BASOPHILS # BLD: 0 K/UL (ref 0–0.1)
BASOPHILS NFR BLD: 0 % (ref 0–1)
BILIRUB SERPL-MCNC: 0.1 MG/DL (ref 0.2–1)
BUN SERPL-MCNC: 10 MG/DL (ref 6–20)
BUN/CREAT SERPL: 14 (ref 12–20)
CA-I BLD-MCNC: 7.2 MG/DL (ref 8.5–10.1)
CHLORIDE SERPL-SCNC: 105 MMOL/L (ref 97–108)
CO2 SERPL-SCNC: 26 MMOL/L (ref 21–32)
CREAT SERPL-MCNC: 0.71 MG/DL (ref 0.55–1.02)
DIFFERENTIAL METHOD BLD: ABNORMAL
EOSINOPHIL # BLD: 0 K/UL (ref 0–0.4)
EOSINOPHIL NFR BLD: 0 % (ref 0–7)
ERYTHROCYTE [DISTWIDTH] IN BLOOD BY AUTOMATED COUNT: 14.6 % (ref 11.5–14.5)
GLOBULIN SER CALC-MCNC: 3.7 G/DL (ref 2–4)
GLUCOSE SERPL-MCNC: 94 MG/DL (ref 65–100)
HCT VFR BLD AUTO: 25.9 % (ref 35–47)
HGB BLD-MCNC: 8 G/DL (ref 11.5–16)
IMM GRANULOCYTES # BLD AUTO: 0.1 K/UL (ref 0–0.04)
IMM GRANULOCYTES NFR BLD AUTO: 1 % (ref 0–0.5)
LYMPHOCYTES # BLD: 2.1 K/UL (ref 0.8–3.5)
LYMPHOCYTES NFR BLD: 16 % (ref 12–49)
MAGNESIUM SERPL-MCNC: 4.7 MG/DL (ref 1.6–2.4)
MAGNESIUM SERPL-MCNC: 6.3 MG/DL (ref 1.6–2.4)
MCH RBC QN AUTO: 26.9 PG (ref 26–34)
MCHC RBC AUTO-ENTMCNC: 30.9 G/DL (ref 30–36.5)
MCV RBC AUTO: 87.2 FL (ref 80–99)
MONOCYTES # BLD: 0.9 K/UL (ref 0–1)
MONOCYTES NFR BLD: 7 % (ref 5–13)
NEUTS SEG # BLD: 9.8 K/UL (ref 1.8–8)
NEUTS SEG NFR BLD: 76 % (ref 32–75)
NRBC # BLD: 0 K/UL (ref 0–0.01)
NRBC BLD-RTO: 0 PER 100 WBC
PLATELET # BLD AUTO: 271 K/UL (ref 150–400)
PMV BLD AUTO: 10.4 FL (ref 8.9–12.9)
POTASSIUM SERPL-SCNC: 4.6 MMOL/L (ref 3.5–5.1)
PROT SERPL-MCNC: 5.6 G/DL (ref 6.4–8.2)
RBC # BLD AUTO: 2.97 M/UL (ref 3.8–5.2)
RPR SER QL: NONREACTIVE
SODIUM SERPL-SCNC: 137 MMOL/L (ref 136–145)
URATE SERPL-MCNC: 4.4 MG/DL (ref 2.6–6)
WBC # BLD AUTO: 12.9 K/UL (ref 3.6–11)

## 2021-09-23 PROCEDURE — 84550 ASSAY OF BLOOD/URIC ACID: CPT

## 2021-09-23 PROCEDURE — 74011000258 HC RX REV CODE- 258: Performed by: OBSTETRICS & GYNECOLOGY

## 2021-09-23 PROCEDURE — 80053 COMPREHEN METABOLIC PANEL: CPT

## 2021-09-23 PROCEDURE — 85025 COMPLETE CBC W/AUTO DIFF WBC: CPT

## 2021-09-23 PROCEDURE — 74011250637 HC RX REV CODE- 250/637: Performed by: OBSTETRICS & GYNECOLOGY

## 2021-09-23 PROCEDURE — 36415 COLL VENOUS BLD VENIPUNCTURE: CPT

## 2021-09-23 PROCEDURE — 74011250636 HC RX REV CODE- 250/636: Performed by: OBSTETRICS & GYNECOLOGY

## 2021-09-23 PROCEDURE — 75410000003 HC RECOV DEL/VAG/CSECN EA 0.5 HR

## 2021-09-23 PROCEDURE — 65410000002 HC RM PRIVATE OB

## 2021-09-23 PROCEDURE — 83735 ASSAY OF MAGNESIUM: CPT

## 2021-09-23 RX ORDER — SERTRALINE HYDROCHLORIDE 50 MG/1
50 TABLET, FILM COATED ORAL
Status: DISCONTINUED | OUTPATIENT
Start: 2021-09-23 | End: 2021-09-24 | Stop reason: HOSPADM

## 2021-09-23 RX ORDER — OXYCODONE AND ACETAMINOPHEN 5; 325 MG/1; MG/1
2 TABLET ORAL
Status: DISCONTINUED | OUTPATIENT
Start: 2021-09-23 | End: 2021-09-24 | Stop reason: HOSPADM

## 2021-09-23 RX ORDER — PANTOPRAZOLE SODIUM 40 MG/1
40 TABLET, DELAYED RELEASE ORAL DAILY
Status: DISCONTINUED | OUTPATIENT
Start: 2021-09-23 | End: 2021-09-24 | Stop reason: HOSPADM

## 2021-09-23 RX ORDER — PROMETHAZINE HYDROCHLORIDE 25 MG/1
25 TABLET ORAL
Status: DISCONTINUED | OUTPATIENT
Start: 2021-09-23 | End: 2021-09-24 | Stop reason: HOSPADM

## 2021-09-23 RX ORDER — DOCUSATE SODIUM 100 MG/1
100 CAPSULE, LIQUID FILLED ORAL
Status: DISCONTINUED | OUTPATIENT
Start: 2021-09-23 | End: 2021-09-24 | Stop reason: HOSPADM

## 2021-09-23 RX ADMIN — LABETALOL HYDROCHLORIDE 200 MG: 200 TABLET, FILM COATED ORAL at 08:43

## 2021-09-23 RX ADMIN — PANTOPRAZOLE SODIUM 40 MG: 40 TABLET, DELAYED RELEASE ORAL at 09:00

## 2021-09-23 RX ADMIN — LABETALOL HYDROCHLORIDE 200 MG: 200 TABLET, FILM COATED ORAL at 21:42

## 2021-09-23 RX ADMIN — IBUPROFEN 800 MG: 800 TABLET, FILM COATED ORAL at 21:42

## 2021-09-23 RX ADMIN — PROMETHAZINE HYDROCHLORIDE 12.5 MG: 25 INJECTION INTRAMUSCULAR; INTRAVENOUS at 14:55

## 2021-09-23 RX ADMIN — ONDANSETRON 4 MG: 2 INJECTION INTRAMUSCULAR; INTRAVENOUS at 13:44

## 2021-09-23 RX ADMIN — MAGNESIUM SULFATE HEPTAHYDRATE 2 G/HR: 40 INJECTION, SOLUTION INTRAVENOUS at 10:17

## 2021-09-23 RX ADMIN — IBUPROFEN 800 MG: 800 TABLET, FILM COATED ORAL at 14:20

## 2021-09-23 RX ADMIN — DOCUSATE SODIUM 100 MG: 100 CAPSULE, LIQUID FILLED ORAL at 21:42

## 2021-09-23 RX ADMIN — DEXTROAMPHETAMINE SACCHARATE, AMPHETAMINE ASPARTATE, DEXTROAMPHETAMINE SULFATE, AMPHETAMINE SULFATE TABLETS, 10 MG,CLL 10 MG: 2.5; 2.5; 2.5; 2.5 TABLET ORAL at 08:43

## 2021-09-23 RX ADMIN — PROMETHAZINE HYDROCHLORIDE 25 MG: 25 TABLET ORAL at 16:54

## 2021-09-23 RX ADMIN — SODIUM CHLORIDE, POTASSIUM CHLORIDE, SODIUM LACTATE AND CALCIUM CHLORIDE 75 ML/HR: 600; 310; 30; 20 INJECTION, SOLUTION INTRAVENOUS at 10:17

## 2021-09-23 RX ADMIN — DEXTROAMPHETAMINE SACCHARATE, AMPHETAMINE ASPARTATE, DEXTROAMPHETAMINE SULFATE, AMPHETAMINE SULFATE TABLETS, 10 MG,CLL 10 MG: 2.5; 2.5; 2.5; 2.5 TABLET ORAL at 17:33

## 2021-09-23 RX ADMIN — DEXTROAMPHETAMINE SACCHARATE, AMPHETAMINE ASPARTATE, DEXTROAMPHETAMINE SULFATE, AMPHETAMINE SULFATE TABLETS, 10 MG,CLL 10 MG: 2.5; 2.5; 2.5; 2.5 TABLET ORAL at 12:05

## 2021-09-23 RX ADMIN — OXYCODONE HYDROCHLORIDE AND ACETAMINOPHEN 1 TABLET: 5; 325 TABLET ORAL at 18:21

## 2021-09-23 RX ADMIN — IBUPROFEN 800 MG: 800 TABLET, FILM COATED ORAL at 06:06

## 2021-09-23 RX ADMIN — SERTRALINE HYDROCHLORIDE 50 MG: 50 TABLET ORAL at 21:42

## 2021-09-23 RX ADMIN — OXYCODONE HYDROCHLORIDE AND ACETAMINOPHEN 1 TABLET: 5; 325 TABLET ORAL at 14:20

## 2021-09-23 NOTE — PROGRESS NOTES
0725: Bedside shift report received from Beatrice Aguillon RN. Patient sleeping, easily aroused. Informed patient that writer would be back around 0800 for next assessment. Patient denies pain or any needs. Will continue with plan of care. 4452: Lab called as CMP resulted at 0418, but the Magnesium did not result. Ambrose Ferro from the lab is running the Magnesium now for us. Dr. Alta Jones beside writer, all other labs can be discontinued at this time as he doesn't wish to have those q6 hours. 46: Pharmacy called to get Protonix order corrected. It is supposed to be given once daily PO; Gissell Majano from pharmacy correcting the order now for it to start at 0900 today. 0830: Patient denies pain, headache, visual disturbances, RUQ pain, or nausea at this time. Patient's pad changed; bleeding scant. 0840: Breakfast tray given to patient at this time. 4482: Dr. Alta Jones at bedside. Updated on plan of care; patient understanding. Will continue with plan of care. 1159: Patient denies pain; resting comfortably with mother at bedside. 1120: Baby at bedside. Patient asked why she couldn't breastfeed due to medications she is on; informed her I would ask nursery after the nursery nurse was done with a . Patient very understanding and appreciative. 1205: Patient's lunch tray taken in at this time; patient denies pain or any complaints. Will continue with plan of care. 1304: Patient sleeping. 1347Devaughn Daigle RN at bedside-patient c/o headache and nausea. RN discontinued mag sulfate. Writer to give meds for complaints. 1405: Dr. Alta Jones made aware of patient status and d/c of mag sulfate. Dr. Alta Jones understanding and agreeing with plan. 1432: Pharmacy called to check on status of Phenergan; pharmacy changed the formulary from Phenergan to Compazine. Spoke with Gissell Majano, the pharmacist who said he would change it back and tube up the Phenergan as soon as he had it mixed.      1515: Per MD, patient can go to Postpartum when Tamara Jeronimo has her ready as she is stable. 1545: Adria d/c. Getting prepared to transfer patient to Postpartum 307.    1600: Bedside shift report given to BRYN Dickey RN. Care relinquished at this time.

## 2021-09-23 NOTE — PROGRESS NOTES
80  Dr. Ron Sweeney notified pt's respirations while asleep are 10-11 bpm with O2Sat 90-92% on room air; pt's respirations while awake since 1900 have been 12-14 bpm; pt has received all of her ordered medications; lungs are clear; lab results given but magnesium level not resulted with CMP. Writer will call lab. Order received for oxygen. Dr. Ron Sweeney also made aware of urine output for past several hours. 0120  Oxygen therapy explained to pt. Oxygen at 2 l/min via NC placed on pt. Pt went directly back to sleep. Respiratory therapist, Shakir Carvajal, made aware. 1730  Dr. Ron Sweeney called inquiring pt status. Made aware of BP's throughout the night; O2 Sat 97-98% on 2 l/min via NC; respirations remain 9-10 bpm; U.O. adequate. No new orders received. 1426  Report given to PATITO Bryant RN.

## 2021-09-23 NOTE — PROGRESS NOTES
OB PROGRESS NOTE    PROBLEM:  S/p  section, postoperative day #1  Chronic hypertension with superimposed preeclampsia, severe features    SUBJECTIVE: Patient is doing better, tolerating pain with medications. VITALS:  Visit Vitals  BP (!) 104/58   Pulse 65   Temp 97.6 °F (36.4 °C)   Resp 10   Ht 5' 8\" (1.727 m)   Wt 84.4 kg (186 lb)   SpO2 99%   Breastfeeding Unknown   BMI 28.28 kg/m²     HEART: Regular rhythm, no murmur  LUNGS: Clear to auscultation at both fields  ABDOMEN: Soft and depressible, normal bowel sounds  PELVIC: Normal lochia    LABS:  Recent Results (from the past 24 hour(s))   COVID-19 RAPID TEST    Collection Time: 21  8:59 AM   Result Value Ref Range    Specimen source Please find results under separate order      COVID-19 rapid test Not Detected Not Detected     POST EXPOSURE PROFILE    Collection Time: 21  8:49 PM   Result Value Ref Range    p24 Antigen Nonreactive Nonreactive      HIV-1,2 Ab Nonreactive Nonreactive      Hepatitis C virus Ab PENDING Index    Hep C virus Ab Interp. PENDING     Hep C  virus Ab comment PENDING     Hepatitis B surface Ag PENDING Index    Hep B surface Ag Interp. PENDING    CBC WITH AUTOMATED DIFF    Collection Time: 21  8:50 PM   Result Value Ref Range    WBC 12.3 (H) 3.6 - 11.0 K/uL    RBC 3.18 (L) 3.80 - 5.20 M/uL    HGB 8.7 (L) 11.5 - 16.0 g/dL    HCT 27.2 (L) 35.0 - 47.0 %    MCV 85.5 80.0 - 99.0 FL    MCH 27.4 26.0 - 34.0 PG    MCHC 32.0 30.0 - 36.5 g/dL    RDW 14.6 (H) 11.5 - 14.5 %    PLATELET 458 894 - 772 K/uL    MPV 10.1 8.9 - 12.9 FL    NRBC 0.0 0.0  WBC    ABSOLUTE NRBC 0.00 0.00 - 0.01 K/uL    NEUTROPHILS 83 (H) 32 - 75 %    LYMPHOCYTES 10 (L) 12 - 49 %    MONOCYTES 6 5 - 13 %    EOSINOPHILS 0 0 - 7 %    BASOPHILS 0 0 - 1 %    IMMATURE GRANULOCYTES 1 (H) 0 - 0.5 %    ABS. NEUTROPHILS 10.4 (H) 1.8 - 8.0 K/UL    ABS. LYMPHOCYTES 1.2 0.8 - 3.5 K/UL    ABS. MONOCYTES 0.7 0.0 - 1.0 K/UL    ABS.  EOSINOPHILS 0.0 0.0 - 0.4 K/UL ABS. BASOPHILS 0.0 0.0 - 0.1 K/UL    ABS. IMM. GRANS. 0.1 (H) 0.00 - 0.04 K/UL    DF AUTOMATED     METABOLIC PANEL, COMPREHENSIVE    Collection Time: 09/22/21  8:50 PM   Result Value Ref Range    Sodium 133 (L) 136 - 145 mmol/L    Potassium 4.6 3.5 - 5.1 mmol/L    Chloride 102 97 - 108 mmol/L    CO2 23 21 - 32 mmol/L    Anion gap 8 5 - 15 mmol/L    Glucose 130 (H) 65 - 100 mg/dL    BUN 9 6 - 20 mg/dL    Creatinine 0.64 0.55 - 1.02 mg/dL    BUN/Creatinine ratio 14 12 - 20      GFR est AA >60 >60 ml/min/1.73m2    GFR est non-AA >60 >60 ml/min/1.73m2    Calcium 8.0 (L) 8.5 - 10.1 mg/dL    Bilirubin, total 0.2 0.2 - 1.0 mg/dL    AST (SGOT) 16 15 - 37 U/L    ALT (SGPT) 12 12 - 78 U/L    Alk. phosphatase 119 (H) 45 - 117 U/L    Protein, total 5.4 (L) 6.4 - 8.2 g/dL    Albumin 2.1 (L) 3.5 - 5.0 g/dL    Globulin 3.3 2.0 - 4.0 g/dL    A-G Ratio 0.6 (L) 1.1 - 2.2     URIC ACID    Collection Time: 09/22/21  8:50 PM   Result Value Ref Range    Uric acid 4.3 2.6 - 6.0 mg/dL   MAGNESIUM    Collection Time: 09/22/21  8:50 PM   Result Value Ref Range    Magnesium 4.7 (H) 1.6 - 2.4 mg/dL   CBC WITH AUTOMATED DIFF    Collection Time: 09/23/21  4:18 AM   Result Value Ref Range    WBC 12.9 (H) 3.6 - 11.0 K/uL    RBC 2.97 (L) 3.80 - 5.20 M/uL    HGB 8.0 (L) 11.5 - 16.0 g/dL    HCT 25.9 (L) 35.0 - 47.0 %    MCV 87.2 80.0 - 99.0 FL    MCH 26.9 26.0 - 34.0 PG    MCHC 30.9 30.0 - 36.5 g/dL    RDW 14.6 (H) 11.5 - 14.5 %    PLATELET 013 814 - 236 K/uL    MPV 10.4 8.9 - 12.9 FL    NRBC 0.0 0.0  WBC    ABSOLUTE NRBC 0.00 0.00 - 0.01 K/uL    NEUTROPHILS 76 (H) 32 - 75 %    LYMPHOCYTES 16 12 - 49 %    MONOCYTES 7 5 - 13 %    EOSINOPHILS 0 0 - 7 %    BASOPHILS 0 0 - 1 %    IMMATURE GRANULOCYTES 1 (H) 0 - 0.5 %    ABS. NEUTROPHILS 9.8 (H) 1.8 - 8.0 K/UL    ABS. LYMPHOCYTES 2.1 0.8 - 3.5 K/UL    ABS. MONOCYTES 0.9 0.0 - 1.0 K/UL    ABS. EOSINOPHILS 0.0 0.0 - 0.4 K/UL    ABS. BASOPHILS 0.0 0.0 - 0.1 K/UL    ABS. IMM.  GRANS. 0.1 (H) 0.00 - 0.04 K/UL    DF AUTOMATED     METABOLIC PANEL, COMPREHENSIVE    Collection Time: 09/23/21  4:18 AM   Result Value Ref Range    Sodium 137 136 - 145 mmol/L    Potassium 4.6 3.5 - 5.1 mmol/L    Chloride 105 97 - 108 mmol/L    CO2 26 21 - 32 mmol/L    Anion gap 6 5 - 15 mmol/L    Glucose 94 65 - 100 mg/dL    BUN 10 6 - 20 mg/dL    Creatinine 0.71 0.55 - 1.02 mg/dL    BUN/Creatinine ratio 14 12 - 20      GFR est AA >60 >60 ml/min/1.73m2    GFR est non-AA >60 >60 ml/min/1.73m2    Calcium 7.2 (L) 8.5 - 10.1 mg/dL    Bilirubin, total 0.1 (L) 0.2 - 1.0 mg/dL    AST (SGOT) 21 15 - 37 U/L    ALT (SGPT) 11 (L) 12 - 78 U/L    Alk. phosphatase 116 45 - 117 U/L    Protein, total 5.6 (L) 6.4 - 8.2 g/dL    Albumin 1.9 (L) 3.5 - 5.0 g/dL    Globulin 3.7 2.0 - 4.0 g/dL    A-G Ratio 0.5 (L) 1.1 - 2.2     URIC ACID    Collection Time: 09/23/21  4:18 AM   Result Value Ref Range    Uric acid 4.4 2.6 - 6.0 mg/dL   MAGNESIUM    Collection Time: 09/23/21  4:18 AM   Result Value Ref Range    Magnesium 6.3 (H) 1.6 - 2.4 mg/dL     ASSESSMENT: She is actually improving, hemodynamically stable, afebrile, ambulating, tolerating diet, voiding spontaneously, with adequate urine output, adequate pain control, without complications.     PLAN:  Continue magnesium sulfate to complete 24 hours  Stool softeners as needed  Pain management

## 2021-09-23 NOTE — PROGRESS NOTES
1600: TRANSFER - IN REPORT:    Verbal report received from Marlene Landers on [facility]  being received from erika and caity for routine progression of care      Report consisted of patients Situation, Background, Assessment and   Recommendations(SBAR). Information from the following report(s) SBAR and MAR was reviewed with the receiving nurse. Opportunity for questions and clarification was provided. Assessment completed upon patients arrival to unit and care assumed. To room via bed from  and d. Oriented to unit, masking policies, remote, diet, tobacco free campus, white board, policies and visitation. Mom handbook and birth certificate given along with information handouts regarding hourly rounds and side effects of medication.  depression screen tool given and instructed to complete. Patient aware to call when out of bed for first time. Call light in reach. No questions. States understanding.

## 2021-09-23 NOTE — PROGRESS NOTES
Dr. Uma Black informed patient only has 1 tab of percocet ordered for pain management and that patient would also like to a stool softener ordered. New orders received.

## 2021-09-24 VITALS
TEMPERATURE: 98.4 F | DIASTOLIC BLOOD PRESSURE: 90 MMHG | SYSTOLIC BLOOD PRESSURE: 137 MMHG | RESPIRATION RATE: 18 BRPM | WEIGHT: 186 LBS | HEIGHT: 68 IN | HEART RATE: 90 BPM | BODY MASS INDEX: 28.19 KG/M2 | OXYGEN SATURATION: 97 %

## 2021-09-24 LAB
BACTERIA SPEC CULT: NORMAL
HBV SURFACE AG SER QL: <0.1 INDEX
HBV SURFACE AG SER QL: NEGATIVE
HCV AB SER IA-ACNC: 0.06 INDEX
HCV AB SERPL QL IA: NONREACTIVE
HIV1 P24 AG SERPL QL IA: NONREACTIVE
HIV1+2 AB SERPL QL IA: NONREACTIVE
SPECIAL REQUESTS,SREQ: NORMAL

## 2021-09-24 PROCEDURE — 74011250637 HC RX REV CODE- 250/637: Performed by: OBSTETRICS & GYNECOLOGY

## 2021-09-24 PROCEDURE — 74011250636 HC RX REV CODE- 250/636: Performed by: OBSTETRICS & GYNECOLOGY

## 2021-09-24 PROCEDURE — 90715 TDAP VACCINE 7 YRS/> IM: CPT | Performed by: OBSTETRICS & GYNECOLOGY

## 2021-09-24 RX ORDER — LABETALOL 200 MG/1
200 TABLET, FILM COATED ORAL 2 TIMES DAILY
Qty: 60 TABLET | Refills: 1 | Status: SHIPPED | OUTPATIENT
Start: 2021-09-24

## 2021-09-24 RX ORDER — IBUPROFEN 800 MG/1
800 TABLET ORAL
Qty: 30 TABLET | Refills: 0 | Status: SHIPPED | OUTPATIENT
Start: 2021-09-24

## 2021-09-24 RX ORDER — BUTALBITAL, ACETAMINOPHEN AND CAFFEINE 50; 325; 40 MG/1; MG/1; MG/1
1 TABLET ORAL
Status: DISCONTINUED | OUTPATIENT
Start: 2021-09-24 | End: 2021-09-24 | Stop reason: HOSPADM

## 2021-09-24 RX ADMIN — DEXTROAMPHETAMINE SACCHARATE, AMPHETAMINE ASPARTATE, DEXTROAMPHETAMINE SULFATE, AMPHETAMINE SULFATE TABLETS, 10 MG,CLL 10 MG: 2.5; 2.5; 2.5; 2.5 TABLET ORAL at 08:46

## 2021-09-24 RX ADMIN — BUTALBITAL, ACETAMINOPHEN, AND CAFFEINE 1 TABLET: 50; 325; 40 TABLET ORAL at 11:06

## 2021-09-24 RX ADMIN — DEXTROAMPHETAMINE SACCHARATE, AMPHETAMINE ASPARTATE, DEXTROAMPHETAMINE SULFATE, AMPHETAMINE SULFATE TABLETS, 10 MG,CLL 10 MG: 2.5; 2.5; 2.5; 2.5 TABLET ORAL at 12:03

## 2021-09-24 RX ADMIN — DESVENLAFAXINE 100 MG: 50 TABLET, EXTENDED RELEASE ORAL at 00:15

## 2021-09-24 RX ADMIN — TRAZODONE HYDROCHLORIDE 100 MG: 50 TABLET ORAL at 00:15

## 2021-09-24 RX ADMIN — IBUPROFEN 800 MG: 800 TABLET, FILM COATED ORAL at 13:56

## 2021-09-24 RX ADMIN — TETANUS TOXOID, REDUCED DIPHTHERIA TOXOID AND ACELLULAR PERTUSSIS VACCINE, ADSORBED 0.5 ML: 5; 2.5; 8; 8; 2.5 SUSPENSION INTRAMUSCULAR at 12:48

## 2021-09-24 RX ADMIN — PANTOPRAZOLE SODIUM 40 MG: 40 TABLET, DELAYED RELEASE ORAL at 08:45

## 2021-09-24 RX ADMIN — OXYCODONE HYDROCHLORIDE AND ACETAMINOPHEN 1 TABLET: 5; 325 TABLET ORAL at 09:57

## 2021-09-24 RX ADMIN — LABETALOL HYDROCHLORIDE 200 MG: 200 TABLET, FILM COATED ORAL at 09:52

## 2021-09-24 RX ADMIN — OXYCODONE HYDROCHLORIDE AND ACETAMINOPHEN 2 TABLET: 5; 325 TABLET ORAL at 00:15

## 2021-09-24 NOTE — DISCHARGE SUMMARY
Discharge Summary     Patient: Gia Casiano MRN: [id number]  SSN: [id number]    YOB: 1975  Age: 42 y.o. Sex: female       Admit Date: 2021    Discharge Date: 2021      Admission Diagnoses: Single delivery by  [O82]; Pregnancy [Z34.90]    Discharge Diagnoses: Postoperative day #2 status post low transverse  section  Problem List as of 2021 Date Reviewed: 2021        Codes Class Noted - Resolved    Single delivery by  section ICD-10-CM: O82  ICD-9-CM: 669.71  2021 - Present        Chronic hypertension with superimposed preeclampsia with severe features ICD-10-CM: O11.9  ICD-9-CM: 642.70  2021 - Present        Pregnancy ICD-10-CM: Z34.90  ICD-9-CM: V22.2  2021 - Present        Pregnant ICD-10-CM: Z34.90  ICD-9-CM: V22.2  2021 - Present        Advanced maternal age in multigravida, third trimester ICD-10-CM: O09.523  ICD-9-CM: 659.63  2021 - Present        History of gastric restrictive surgery ICD-10-CM: Z98.84  ICD-9-CM: V45.86  2021 - Present        S/P laparoscopic cholecystectomy ICD-10-CM: Z90.49  ICD-9-CM: V45.89  2019 - Present    Overview Signed 2019  8:10 AM by Padmini Baker MD     Robotic-assisted laparoscopic cholecystectomy with firefly and IOC.              Gastroesophageal reflux disease without esophagitis ICD-10-CM: K21.9  ICD-9-CM: 530.81  2019 - Present        Depression ICD-10-CM: F32.9  ICD-9-CM: 371  2019 - Present        Attention deficit disorder (ADD) in adult ICD-10-CM: F98.8  ICD-9-CM: 314.00  2019 - Present        Elevated BP without diagnosis of hypertension ICD-10-CM: R03.0  ICD-9-CM: 796.2  2019 - Present        RESOLVED: 32 weeks gestation of pregnancy ICD-10-CM: Z3A.32  ICD-9-CM: V22.2  2021 - 2021        RESOLVED: Acute gallstone pancreatitis ICD-10-CM: K85.10  ICD-9-CM: 577.0, 574.20  2019 - 2019        RESOLVED: Calculus of gallbladder with biliary obstruction but without cholecystitis ICD-10-CM: K80.21  ICD-9-CM: 574.21  2019 - 2019               Discharge Condition: [facility] [place] [facility] Course: Patient was admitted to the hospital and underwent low transverse  section. She did well postoperatively and was able to tolerate a regular diet on the first postoperative day. She remained afebrile throughout her hospital course. Her blood pressures have been controlled with labetalol. She is now discharged home on the second postoperative day in good condition. She is discharged on labetalol for blood pressure control and Percocet and Motrin for pain relief. Consults: None    Significant Diagnostic Studies: None    Disposition: Discharged to home self-care    Discharge Medications:   Current Discharge Medication List      START taking these medications    Details   ibuprofen (MOTRIN) 800 mg tablet Take 1 Tablet by mouth every six (6) hours as needed for Pain. Qty: 30 Tablet, Refills: 0      oxyCODONE-acetaminophen (PERCOCET) 5-325 mg per tablet Take 1 Tablet by mouth every four (4) hours as needed for Pain for up to 14 days. Max Daily Amount: 6 Tablets. Qty: 30 Tablet, Refills: 0    Associated Diagnoses: Post-op pain         CONTINUE these medications which have CHANGED    Details   labetaloL (NORMODYNE) 200 mg tablet Take 1 Tablet by mouth two (2) times a day. Qty: 60 Tablet, Refills: 1         CONTINUE these medications which have NOT CHANGED    Details   sertraline (ZOLOFT) 50 mg tablet Take 1 Tab by mouth daily. Indications: major depressive disorder  Qty: 60 Tab, Refills: 2    Associated Diagnoses: Depressed affect      Desvenlafaxine (Pristiq) 100 mg Tb24 Take 1 Tablet by mouth daily. traZODone (DESYREL) 100 mg tablet Take 100 mg by mouth nightly. prenatal vit-iron fumarate-fa 27 mg iron- 0.8 mg tab tablet Take 1 Tab by mouth daily.   Qty: 90 Tab, Refills: 3    Associated Diagnoses: Antepartum multigravida of advanced maternal age      dextroamphetamine-amphetamine (ADDERALL) 10 mg tablet Take  by mouth four (4) times daily. omeprazole (PRILOSEC) 10 mg capsule Take 40 mg by mouth daily. FLUoxetine (PROZAC) 10 mg capsule Take 10 mg by mouth nightly. STOP taking these medications       nitrofurantoin, macrocrystal-monohydrate, (Macrobid) 100 mg capsule Comments:   Reason for Stopping:         aspirin 81 mg chewable tablet Comments:   Reason for Stopping:         butalbital-acetaminophen-caffeine (FIORICET, ESGIC) -40 mg per tablet Comments:   Reason for Stopping:         magnesium oxide (MAG-OX) 400 mg tablet Comments:   Reason for Stopping:         acetaminophen (TYLENOL) 325 mg tablet Comments:   Reason for Stopping:               Activity: Gradually increase activity as tolerated  Diet: Regular  Wound Care: Keep wound clean and dry    Follow-up Appointments   Procedures    FOLLOW UP VISIT Appointment in: One Week Blood pressure check     Blood pressure check     Standing Status:   Standing     Number of Occurrences:   1     Order Specific Question:   Appointment in     Answer:    One Week       Signed By: Alireza Marion MD     September 24, 2021

## 2021-09-24 NOTE — PROGRESS NOTES
1356: Discharge plan of care/case management plan validated with provider discharge order. Discharge instructions reviewed. Rx for percocet, motrin and labetalol sent to pharmacy on file. Patient aware purpose and side effects of meds. Patient aware when and how to call md after discharge. Patient aware follow up date, time and location. Patient states history of depression, discussed medications to continue. Aware signs and symptoms of depression to call md regarding after discharge. No questions. States understanding. 1550: Patient discharged via wheelchair to front lobby. Baby in carseat.  Belongings with patient

## 2021-09-24 NOTE — PROGRESS NOTES
Progress Note    Patient: Emanuel De La Vega MRN: [id number]  SSN: [id number]    YOB: 1975  Age: 42 y.o. Sex: female      Admit Date: 2021    LOS: 2 days     Subjective:     Patient complains of headache this a.m., she reports minimal lochia, she is tolerating a regular diet    Objective:     Vitals:    21 0012 21 0750 21 0952   BP: 118/64 130/77 118/70 (!) 137/90   Pulse: 73 77 74 90   Resp: 18 16 18    Temp: 98.4 °F (36.9 °C) 97.3 °F (36.3 °C) 98.4 °F (36.9 °C)    SpO2: 98% 96% 97%    Weight:       Height:            Physical Exam:   Heart is with regular rate and rhythm  Lungs are clear  Wound is clean dry and intact  Fundus is below umbilicus  Extremities without clubbing cyanosis or edema    Lab/Data Review:  No new lab    Assessment:     Active Problems:    Pregnancy (2021)      Single delivery by  section (2021)      Chronic hypertension with superimposed preeclampsia with severe features (2021)    Postoperative day #2 status post low transverse  section, stable.     Plan:     Discharge to home today    Signed By: Sangita Jarvis MD     2021

## 2021-09-24 NOTE — PROGRESS NOTES
Problem: Patient Education: Go to Patient Education Activity  Goal: Patient/Family Education  Outcome: Progressing Towards Goal     Problem:  Delivery: Postpartum Day 1  Goal: *Performs self perineal care  Outcome: Progressing Towards Goal     Problem:  Delivery: Postpartum Day 2  Goal: Off Pathway (Use only if patient is Off Pathway)  Outcome: Progressing Towards Goal  Goal: Activity/Safety  Outcome: Progressing Towards Goal  Goal: Consults, if ordered  Outcome: Progressing Towards Goal  Goal: Nutrition/Diet  Outcome: Progressing Towards Goal  Goal: Discharge Planning  Outcome: Progressing Towards Goal  Goal: Medications  Outcome: Progressing Towards Goal  Goal: Treatments/Interventions/Procedures  Outcome: Progressing Towards Goal  Goal: Psychosocial  Outcome: Progressing Towards Goal  Goal: *Vital signs within defined limits  Outcome: Progressing Towards Goal  Goal: *Labs within defined limits  Outcome: Progressing Towards Goal  Goal: *Hemodynamically stable  Outcome: Progressing Towards Goal  Goal: *Optimal pain control at patient's stated goal  Outcome: Progressing Towards Goal  Goal: *Participates in infant care  Outcome: Progressing Towards Goal  Goal: *Demonstrates progressive activity  Outcome: Progressing Towards Goal  Goal: *Appropriate parent-infant bonding  Outcome: Progressing Towards Goal  Goal: *Tolerating diet  Outcome: Progressing Towards Goal     Problem:  Delivery: Postpartum Day 3  Goal: Off Pathway (Use only if patient is Off Pathway)  Outcome: Progressing Towards Goal  Goal: Activity/Safety  Outcome: Progressing Towards Goal  Goal: Consults, if ordered  Outcome: Progressing Towards Goal  Goal: Nutrition/Diet  Outcome: Progressing Towards Goal  Goal: Discharge Planning  Outcome: Progressing Towards Goal  Goal: Medications  Outcome: Progressing Towards Goal  Goal: Treatments/Interventions/Procedures  Outcome: Progressing Towards Goal  Goal: Psychosocial  Outcome: Progressing Towards Goal     Problem:  Delivery: Discharge Outcomes  Goal: *Follow-up appointments as indicated  Outcome: Progressing Towards Goal  Goal: *Describes available resources and support systems  Outcome: Progressing Towards Goal  Goal: *No signs and symptoms of infection  Outcome: Progressing Towards Goal  Goal: *Birth certificate information completed  Outcome: Progressing Towards Goal  Goal: *Received and verbalizes understanding of discharge plan and instructions  Outcome: Progressing Towards Goal  Goal: *Vital signs within defined limits  Outcome: Progressing Towards Goal  Goal: *Labs within defined limits  Outcome: Progressing Towards Goal  Goal: *Hemodynamically stable  Outcome: Progressing Towards Goal  Goal: *Optimal pain control at patient's stated goal  Outcome: Progressing Towards Goal  Goal: *Participates in infant care  Outcome: Progressing Towards Goal  Goal: *Demonstrates progressive activity  Outcome: Progressing Towards Goal  Goal: *Appropriate parent-infant bonding  Outcome: Progressing Towards Goal  Goal: *Tolerating diet  Outcome: Progressing Towards Goal  Goal: *Verbalizes name, dosage, time, side effects, and number of days to continue medications  Outcome: Progressing Towards Goal  Goal: *Influenza vaccine administered (October-March)  Outcome: Progressing Towards Goal     Problem: Falls - Risk of  Goal: *Absence of Falls  Description: Document Duke Fall Risk and appropriate interventions in the flowsheet.   Outcome: Progressing Towards Goal  Note: Fall Risk Interventions:            Medication Interventions: Teach patient to arise slowly, Patient to call before getting OOB                   Problem: Patient Education: Go to Patient Education Activity  Goal: Patient/Family Education  Outcome: Progressing Towards Goal

## 2021-09-27 ENCOUNTER — APPOINTMENT (OUTPATIENT)
Dept: GENERAL RADIOLOGY | Age: 46
End: 2021-09-27
Attending: STUDENT IN AN ORGANIZED HEALTH CARE EDUCATION/TRAINING PROGRAM
Payer: COMMERCIAL

## 2021-09-27 ENCOUNTER — HOSPITAL ENCOUNTER (EMERGENCY)
Age: 46
Discharge: HOME | End: 2021-09-27
Attending: STUDENT IN AN ORGANIZED HEALTH CARE EDUCATION/TRAINING PROGRAM
Payer: COMMERCIAL

## 2021-09-27 VITALS
RESPIRATION RATE: 16 BRPM | BODY MASS INDEX: 28.04 KG/M2 | DIASTOLIC BLOOD PRESSURE: 96 MMHG | TEMPERATURE: 98.2 F | SYSTOLIC BLOOD PRESSURE: 162 MMHG | HEART RATE: 65 BPM | HEIGHT: 68 IN | WEIGHT: 185 LBS | OXYGEN SATURATION: 99 %

## 2021-09-27 DIAGNOSIS — K59.01 SLOW TRANSIT CONSTIPATION: Primary | ICD-10-CM

## 2021-09-27 DIAGNOSIS — K60.2 ANAL FISSURE: ICD-10-CM

## 2021-09-27 PROCEDURE — 99282 EMERGENCY DEPT VISIT SF MDM: CPT

## 2021-09-27 PROCEDURE — 74018 RADEX ABDOMEN 1 VIEW: CPT

## 2021-09-27 RX ORDER — MAGNESIUM CITRATE
148 SOLUTION, ORAL ORAL 2 TIMES DAILY
Qty: 1480 ML | Refills: 0 | Status: SHIPPED | OUTPATIENT
Start: 2021-09-27 | End: 2021-10-02

## 2021-09-27 RX ORDER — GLYCERIN ADULT
1 SUPPOSITORY, RECTAL RECTAL
Qty: 1 SUPPOSITORY | Refills: 0 | Status: SHIPPED | OUTPATIENT
Start: 2021-09-27

## 2021-09-27 RX ORDER — LIDOCAINE HCL 4 G/100G
CREAM TOPICAL
Qty: 1 G | Refills: 0 | Status: SHIPPED | OUTPATIENT
Start: 2021-09-27 | End: 2021-09-30

## 2021-09-27 NOTE — ED NOTES
Pt ambulated A&Ox4, GCS 15 to ED lobby, accompanied by mother. In possession of personal belongings and discharge instructions.

## 2021-09-27 NOTE — ED PROVIDER NOTES
EMERGENCY DEPARTMENT HISTORY AND PHYSICAL EXAM      Date: 2021  Patient Name: Danis Moon    History of Presenting Illness     Chief Complaint   Patient presents with    Constipation    Anal Pain       HPI: Danis Moon, 42 y.o. female with a past medical history of asthma, hypertension, and recent  5 days ago presenting today for a 24-hour history of anal pain. Patient has been constipated for the past few days and reports straining. She noted some hard stools yesterday. Today, she has severe anal pain that worsens with defecation. She has intermittent abdominal cramps when trying to strain. She reports blood post is unsure if it is from the rectum or from vaginal bleeding. She denies any nausea, vomiting, fevers, or chills. Denies any dysuria or hematuria. Abdominal pain is present but unchanged from post . Denies any lightheadedness, dizziness or syncope. To note, the patient tried MiraLAX and senna tea without adequate stooling. PCP: TERRENCE Deng    Current Outpatient Medications   Medication Sig Dispense Refill    magnesium citrate solution Take 148 mL by mouth two (2) times a day for 5 days. 1480 mL 0    glycerin, adult, suppository Insert 1 Suppository into rectum two (2) times daily as needed (constipation). 1 Suppository 0    lidocaine (XYLOCAINE) 4 % topical cream Apply  to affected area two (2) times daily as needed for Pain for up to 3 days. 1 g 0    ibuprofen (MOTRIN) 800 mg tablet Take 1 Tablet by mouth every six (6) hours as needed for Pain. 30 Tablet 0    labetaloL (NORMODYNE) 200 mg tablet Take 1 Tablet by mouth two (2) times a day. 60 Tablet 1    oxyCODONE-acetaminophen (PERCOCET) 5-325 mg per tablet Take 1 Tablet by mouth every four (4) hours as needed for Pain for up to 14 days. Max Daily Amount: 6 Tablets. 30 Tablet 0    sertraline (ZOLOFT) 50 mg tablet Take 1 Tab by mouth daily.  Indications: major depressive disorder 60 Tab 2    Desvenlafaxine (Pristiq) 100 mg Tb24 Take 1 Tablet by mouth daily.  traZODone (DESYREL) 100 mg tablet Take 100 mg by mouth nightly.  prenatal vit-iron fumarate-fa 27 mg iron- 0.8 mg tab tablet Take 1 Tab by mouth daily. 90 Tab 3    FLUoxetine (PROZAC) 10 mg capsule Take 10 mg by mouth nightly. (Patient not taking: Reported on 2021)      dextroamphetamine-amphetamine (ADDERALL) 10 mg tablet Take  by mouth four (4) times daily.  omeprazole (PRILOSEC) 10 mg capsule Take 40 mg by mouth daily. Medical History   I reviewed the medical, surgical, family, and social history, as well as allergies:    Past Medical History:  Past Medical History:   Diagnosis Date    ADHD     Asthma     Depression     Essential hypertension     Gastrointestinal disorder     GERD (gastroesophageal reflux disease)     Ill-defined condition     ADD    Psychiatric disorder        Past Surgical History:  Past Surgical History:   Procedure Laterality Date    HX  SECTION      HX CHOLECYSTECTOMY  2019    Robotic-assisted laparoscopic cholecystectomy with firefly and IOC.  HX GI      gastric sleeve    HX HEENT      wisdom teeth    HX OTHER SURGICAL         Family History:  Family History   Problem Relation Age of Onset   Larance Vining Diabetes Mother     GERD Mother     Thyroid Disease Mother     Hypertension Neg Hx        Social History:  Social History     Tobacco Use    Smoking status: Former Smoker     Packs/day: 0.25     Years: 3.00     Pack years: 0.75     Quit date: 2014     Years since quittin.4    Smokeless tobacco: Never Used   Vaping Use    Vaping Use: Never used   Substance Use Topics    Alcohol use: Not Currently     Alcohol/week: 5.0 standard drinks     Types: 5 Glasses of wine per week    Drug use: Not Currently       Allergies:  No Known Allergies    Review of Systems     Review of Systems   Constitutional: Negative for chills and fever.    HENT: Negative for congestion, rhinorrhea and sore throat. Eyes: Negative. Respiratory: Negative for cough and shortness of breath. Cardiovascular: Negative for chest pain and leg swelling. Gastrointestinal: Positive for abdominal pain. Negative for vomiting. Anal pain   Endocrine: Negative. Genitourinary: Negative for dysuria and hematuria. Musculoskeletal: Negative for back pain and myalgias. Skin: Negative for rash and wound. Allergic/Immunologic: Negative. Neurological: Negative for light-headedness and numbness. Hematological: Negative. Psychiatric/Behavioral: Negative for agitation and confusion. Physical Exam and Vital Signs   Vital Signs - Reviewed the patient's vital signs. Patient Vitals for the past 12 hrs:   Temp Pulse Resp BP SpO2   21 1105 98.2 °F (36.8 °C) 65 16 (!) 162/96 99 %       Physical Exam:    GENERAL: awake, alert, cooperative, not in distress  HEENT:  * Pupils equal, EOMI  * Head atraumatic  CV:  * regular rhythm  * warm and perfused extremities bilaterally  PULMONARY: Good air movement, no wheezes or crackles  ABDOMEN: soft, not distended, no guarding, mild diffuse tenderness to palpation, soft,   incision is within normal limits, healing well, no evidence of cellulitis or abscess. No discharge. No dehiscence. Anterior and posterior anal fissures on exam.  : No suprapubic tenderness  EXTREMITIES/BACK: warm and perfused, no tenderness, no edema  SKIN: no rashes or signs of trauma  NEURO:  * Speech clear  * Moves U&LE to command      Medical Decision Making and ED Course   - I am the first and primary provider for this patient and am the primary provider of record. - I reviewed the vital signs, available nursing notes, past medical history, past surgical history, family history and social history. - Initial assessment performed.  The patients presenting problems have been discussed, and the staff are in agreement with the care plan formulated and outlined with them. I have encouraged them to ask questions as they arise throughout their visit. - Available medical records, nursing notes, old EKGs, and EMS run sheets (if patient was EMS transported) were reviewed    MDM:   Patient is a 42 y.o. female presenting for anal pain post . Vitals reveal no abnormalities and physical exam reveals diffuse mild abdominal tenderness which is unchanged from  with interval healing  scar. Based on the history, physical exam, risk factors, and vitals signs, differential includes: Hemorrhoid, anal fissure, UTI, organ prolapse, vaginal laceration, rectovesicular fistula, functional constipation. Results     Labs:  No results found for this or any previous visit (from the past 12 hour(s)). Radiologic Studies:  CT Results  (Last 48 hours)    None        CXR Results  (Last 48 hours)    None          Medications ordered:  Medications - No data to display     ED Course     ED Course:          Reassessment / Disposition / Discussion:    Patient has picture of anal fissure. KUB shows diffuse stool without any evidence of SBO. Abdomen soft without any guarding thus SBO is unlikely. We will give the patient laxatives and lidocaine cream with follow-up. The patient has a PCP appointment in 2 days. Final Disposition     Disposition: Condition stable  DC- Adult Discharges: All of the diagnostic tests were reviewed and questions answered. Diagnosis, care plan and treatment options were discussed. The patient understands the instructions and will follow up as directed. The patients results have been reviewed with them. They have been counseled regarding their diagnosis. The patient verbally convey understanding and agreement of the signs, symptoms, diagnosis, treatment and prognosis and additionally agrees to follow up as recommended with their PCP in 24 - 48 hours.   They also agree with the care-plan and convey that all of their questions have been answered. I have also put together some discharge instructions for them that include: 1) educational information regarding their diagnosis, 2) how to care for their diagnosis at home, as well a 3) list of reasons why they would want to return to the ED prior to their follow-up appointment, should their condition change. DISCHARGE PLAN:  1. Current Discharge Medication List      START taking these medications    Details   magnesium citrate solution Take 148 mL by mouth two (2) times a day for 5 days. Qty: 1480 mL, Refills: 0      glycerin, adult, suppository Insert 1 Suppository into rectum two (2) times daily as needed (constipation). Qty: 1 Suppository, Refills: 0      lidocaine (XYLOCAINE) 4 % topical cream Apply  to affected area two (2) times daily as needed for Pain for up to 3 days. Qty: 1 g, Refills: 0         CONTINUE these medications which have NOT CHANGED    Details   ibuprofen (MOTRIN) 800 mg tablet Take 1 Tablet by mouth every six (6) hours as needed for Pain. Qty: 30 Tablet, Refills: 0      labetaloL (NORMODYNE) 200 mg tablet Take 1 Tablet by mouth two (2) times a day. Qty: 60 Tablet, Refills: 1      oxyCODONE-acetaminophen (PERCOCET) 5-325 mg per tablet Take 1 Tablet by mouth every four (4) hours as needed for Pain for up to 14 days. Max Daily Amount: 6 Tablets. Qty: 30 Tablet, Refills: 0    Associated Diagnoses: Post-op pain      sertraline (ZOLOFT) 50 mg tablet Take 1 Tab by mouth daily. Indications: major depressive disorder  Qty: 60 Tab, Refills: 2    Associated Diagnoses: Depressed affect      Desvenlafaxine (Pristiq) 100 mg Tb24 Take 1 Tablet by mouth daily. traZODone (DESYREL) 100 mg tablet Take 100 mg by mouth nightly. prenatal vit-iron fumarate-fa 27 mg iron- 0.8 mg tab tablet Take 1 Tab by mouth daily. Qty: 90 Tab, Refills: 3    Associated Diagnoses: Antepartum multigravida of advanced maternal age      FLUoxetine (PROZAC) 10 mg capsule Take 10 mg by mouth nightly. dextroamphetamine-amphetamine (ADDERALL) 10 mg tablet Take  by mouth four (4) times daily. omeprazole (PRILOSEC) 10 mg capsule Take 40 mg by mouth daily. 2.   Follow-up Information     Follow up With Specialties Details Why [address] [facility] EMERGENCY DEPT Emergency Medicine Go to  If symptoms worsen [address] [facility] [id number] [phone]    Your doctor  Schedule an appointment as soon as possible for a visit in 1 day          3. Return to ED if worse   4. Current Discharge Medication List      START taking these medications    Details   magnesium citrate solution Take 148 mL by mouth two (2) times a day for 5 days. Qty: 1480 mL, Refills: 0  Start date: 9/27/2021, End date: 10/2/2021      glycerin, adult, suppository Insert 1 Suppository into rectum two (2) times daily as needed (constipation). Qty: 1 Suppository, Refills: 0  Start date: 9/27/2021      lidocaine (XYLOCAINE) 4 % topical cream Apply  to affected area two (2) times daily as needed for Pain for up to 3 days. Qty: 1 g, Refills: 0  Start date: 9/27/2021, End date: 9/30/2021             Diagnosis     Clinical Impression:   1. Slow transit constipation    2. Anal fissure        Attestations:    Deana Landa MD    Please note that this dictation was completed with [employer], the computer voice recognition software. Quite often unanticipated grammatical, syntax, homophones, and other interpretive errors are inadvertently transcribed by the computer software. Please disregard these errors. Please excuse any errors that have escaped final proofreading. Thank you.

## 2021-09-27 NOTE — ED TRIAGE NOTES
GCS 15 pt stated that last Wednesday she had a  and is having extreme rectal pain; pt's LBM was last weekend; pt stated that last night she had a a small amount of hard stool; pt stated that her ABD pains comes and goes

## 2021-09-29 ENCOUNTER — OFFICE VISIT (OUTPATIENT)
Dept: OBGYN CLINIC | Age: 46
End: 2021-09-29
Payer: COMMERCIAL

## 2021-09-29 VITALS
WEIGHT: 174 LBS | DIASTOLIC BLOOD PRESSURE: 100 MMHG | SYSTOLIC BLOOD PRESSURE: 159 MMHG | HEIGHT: 68 IN | BODY MASS INDEX: 26.37 KG/M2

## 2021-09-29 DIAGNOSIS — Z09 POSTOP CHECK: Primary | ICD-10-CM

## 2021-09-29 PROCEDURE — 99024 POSTOP FOLLOW-UP VISIT: CPT | Performed by: OBSTETRICS & GYNECOLOGY

## 2021-09-29 RX ORDER — SERTRALINE HYDROCHLORIDE 100 MG/1
100 TABLET, FILM COATED ORAL DAILY
Qty: 90 TABLET | Refills: 2 | Status: SHIPPED | OUTPATIENT
Start: 2021-09-29

## 2021-09-29 NOTE — PROGRESS NOTES
Deb Bill is a 42 y.o. female, , No LMP recorded. , who presents today for the following:  Chief Complaint   Patient presents with    Wound Check        No Known Allergies    Current Outpatient Medications   Medication Sig    sertraline (ZOLOFT) 100 mg tablet Take 1 Tablet by mouth daily.  magnesium citrate solution Take 148 mL by mouth two (2) times a day for 5 days.  glycerin, adult, suppository Insert 1 Suppository into rectum two (2) times daily as needed (constipation).  ibuprofen (MOTRIN) 800 mg tablet Take 1 Tablet by mouth every six (6) hours as needed for Pain.  labetaloL (NORMODYNE) 200 mg tablet Take 1 Tablet by mouth two (2) times a day.  traZODone (DESYREL) 100 mg tablet Take 100 mg by mouth nightly.  prenatal vit-iron fumarate-fa 27 mg iron- 0.8 mg tab tablet Take 1 Tab by mouth daily.  dextroamphetamine-amphetamine (ADDERALL) 10 mg tablet Take  by mouth four (4) times daily.  omeprazole (PRILOSEC) 10 mg capsule Take 40 mg by mouth daily.  lidocaine (XYLOCAINE) 4 % topical cream Apply  to affected area two (2) times daily as needed for Pain for up to 3 days. (Patient not taking: Reported on 2021)    oxyCODONE-acetaminophen (PERCOCET) 5-325 mg per tablet Take 1 Tablet by mouth every four (4) hours as needed for Pain for up to 14 days. Max Daily Amount: 6 Tablets. (Patient not taking: Reported on 2021)    Desvenlafaxine (Pristiq) 100 mg Tb24 Take 1 Tablet by mouth daily.  FLUoxetine (PROZAC) 10 mg capsule Take 10 mg by mouth nightly. (Patient not taking: Reported on 2021)     No current facility-administered medications for this visit.        Past Medical History:   Diagnosis Date    ADHD     Asthma     Depression     Essential hypertension     Gastrointestinal disorder     GERD (gastroesophageal reflux disease)     Ill-defined condition     ADD    Psychiatric disorder        Past Surgical History:   Procedure Laterality Date    HX  SECTION      HX CHOLECYSTECTOMY  2019    Robotic-assisted laparoscopic cholecystectomy with firefly and IOC.  HX GI      gastric sleeve    HX HEENT      wisdom teeth    HX OTHER SURGICAL         Family History   Problem Relation Age of Onset    Diabetes Mother     GERD Mother     Thyroid Disease Mother     Hypertension Neg Hx        Social History     Socioeconomic History    Marital status: SINGLE     Spouse name: Not on file    Number of children: Not on file    Years of education: Not on file    Highest education level: Not on file   Occupational History    Not on file   Tobacco Use    Smoking status: Former Smoker     Packs/day: 0.25     Years: 3.00     Pack years: 0.75     Quit date: 2014     Years since quittin.4    Smokeless tobacco: Never Used   Vaping Use    Vaping Use: Never used   Substance and Sexual Activity    Alcohol use: Not Currently     Alcohol/week: 5.0 standard drinks     Types: 5 Glasses of wine per week    Drug use: Not Currently    Sexual activity: Yes     Partners: Male     Birth control/protection: None   Other Topics Concern     Service No    Blood Transfusions No    Caffeine Concern No    Occupational Exposure No    Hobby Hazards No    Sleep Concern No    Stress Concern No    Weight Concern No    Special Diet No    Back Care No    Exercise No    Bike Helmet No    Seat Belt No    Self-Exams No   Social History Narrative    Not on file     Social Determinants of Health     Financial Resource Strain:     Difficulty of Paying Living Expenses:    Food Insecurity:     Worried About Running Out of Food in the Last Year:     Ran Out of Food in the Last Year:    Transportation Needs:     Lack of Transportation (Medical):      Lack of Transportation (Non-Medical):    Physical Activity:     Days of Exercise per Week:     Minutes of Exercise per Session:    Stress:     Feeling of Stress :    Social Connections:     Frequency of Communication with Friends and Family:     Frequency of Social Gatherings with Friends and Family:     Attends Zoroastrianism Services:     Active Member of Clubs or Organizations:     Attends Club or Organization Meetings:     Marital Status:    Intimate Partner Violence:     Fear of Current or Ex-Partner:     Emotionally Abused:     Physically Abused:     Sexually Abused:          HPI  Patient presents for incision check  S//p elective csection  Doing well but reports episodes of crying and feeling down  History of depression  No thoughts of harm to self or others    Review of Systems   Constitutional: Negative. Respiratory: Negative. Cardiovascular: Negative. Gastrointestinal: Negative. Genitourinary: Negative. Musculoskeletal: Negative. Skin: Negative. Neurological: Negative. Endo/Heme/Allergies: Negative. Psychiatric/Behavioral: Positive for depression. All other systems reviewed and are negative. BP (!) 159/100 (BP 1 Location: Left upper arm, BP Patient Position: Sitting)   Ht 5' 8\" (1.727 m)   Wt 174 lb (78.9 kg)   BMI 26.46 kg/m²    OBGyn Exam   General Appearance: General Appearance: healthy-appearing, well-nourished, well groomed, no acute distress, and ambulating well. Psychiatric: Orientation: to time, place, and person. Mood and Affect: normal mood and affect and appropriate and active and alert. Skin: Appearance: no rashes or lesions. Abdomen: Inspection of Incision Site: well-healed, clean, dry, intact, and non tender. Auscultation/Inspection/Palpation: soft, non-distended, and no tenderness (no guarding, no rebound). 1. Postop check      2. Postpartum depression    - sertraline (ZOLOFT) 100 mg tablet; Take 1 Tablet by mouth daily. Dispense: 90 Tablet; Refill: 2        Follow-up and Dispositions    · Return in about 4 weeks (around 10/27/2021) for postpartum.

## 2022-03-19 PROBLEM — F98.8 ATTENTION DEFICIT DISORDER (ADD) IN ADULT: Status: ACTIVE | Noted: 2019-04-09

## 2022-03-19 PROBLEM — Z98.84 HISTORY OF GASTRIC RESTRICTIVE SURGERY: Status: ACTIVE | Noted: 2021-08-05

## 2022-03-19 PROBLEM — Z34.90 PREGNANT: Status: ACTIVE | Noted: 2021-08-05

## 2022-03-19 PROBLEM — F32.A DEPRESSION: Status: ACTIVE | Noted: 2019-04-09

## 2022-03-19 PROBLEM — Z34.90 PREGNANCY: Status: ACTIVE | Noted: 2021-09-22

## 2022-03-19 PROBLEM — R03.0 ELEVATED BP WITHOUT DIAGNOSIS OF HYPERTENSION: Status: ACTIVE | Noted: 2019-04-09

## 2022-03-19 PROBLEM — O11.9 CHRONIC HYPERTENSION WITH SUPERIMPOSED PREECLAMPSIA: Status: ACTIVE | Noted: 2021-09-23

## 2022-03-19 PROBLEM — O09.523 ADVANCED MATERNAL AGE IN MULTIGRAVIDA, THIRD TRIMESTER: Status: ACTIVE | Noted: 2021-08-05

## 2022-03-20 PROBLEM — K21.9 GASTROESOPHAGEAL REFLUX DISEASE WITHOUT ESOPHAGITIS: Status: ACTIVE | Noted: 2019-04-09

## 2022-03-20 PROBLEM — Z90.49 S/P LAPAROSCOPIC CHOLECYSTECTOMY: Status: ACTIVE | Noted: 2019-04-12

## 2022-07-18 ENCOUNTER — TELEPHONE (OUTPATIENT)
Dept: FAMILY MEDICINE CLINIC | Age: 47
End: 2022-07-18

## 2022-10-07 ENCOUNTER — NURSE TRIAGE (OUTPATIENT)
Dept: OTHER | Facility: CLINIC | Age: 47
End: 2022-10-07

## 2022-10-07 NOTE — TELEPHONE ENCOUNTER
Received call from Cherylene Rogue at [facility] with Red Flag Complaint. Pt attempting to establish care with [facility]. Subjective: Caller states \"Rib injury yesterday\"     Current Symptoms: Passed out twice when letting the dog out in the middle of the night last night. Felt lightheaded and lost consciousness, hitting ribs on the counter. Believes LOC was seconds-1 min. Caller suspects passing out possibly related to BP medication she re-started yesterday morning. Took BP med this morning and has not felt lightheaded or dizzy today. Pain in ribs severe yesterday, more tolerable today. Pt reports \"popping\" or moving internally, slight bruising and swelling yesterday     Onset: 2 day    Associated Symptoms: reduced activity    Pain Severity: 7/10, 10/10 yesterday     Temperature: Denies    What has been tried: ASA     LMP:  9/22/2022  Pregnant: No    Recommended disposition: See PCP within 3 Days    Care advice provided, patient verbalizes understanding; denies any other questions or concerns; instructed to call back for any new or worsening symptoms. Pt connected with Katiuska Barragan at [facility] for appt to establish care. Attention Provider: Thank you for allowing me to participate in the care of your patient. The patient was connected to triage in response to information provided to the [facility]. Please do not respond through this encounter as the response is not directed to a shared pool.         Reason for Disposition   Injury and pain has not improved after 3 days    Protocols used: Chest Injury-ADULT-[place]

## 2024-04-09 ENCOUNTER — OFFICE VISIT (OUTPATIENT)
Facility: CLINIC | Age: 49
End: 2024-04-09

## 2024-04-09 VITALS
SYSTOLIC BLOOD PRESSURE: 141 MMHG | RESPIRATION RATE: 18 BRPM | HEIGHT: 68 IN | BODY MASS INDEX: 23.79 KG/M2 | TEMPERATURE: 98.2 F | OXYGEN SATURATION: 100 % | WEIGHT: 157 LBS | HEART RATE: 77 BPM | DIASTOLIC BLOOD PRESSURE: 90 MMHG

## 2024-04-09 DIAGNOSIS — Z11.59 ENCOUNTER FOR HEPATITIS C SCREENING TEST FOR LOW RISK PATIENT: ICD-10-CM

## 2024-04-09 DIAGNOSIS — Z11.3 SCREENING EXAMINATION FOR STD (SEXUALLY TRANSMITTED DISEASE): ICD-10-CM

## 2024-04-09 DIAGNOSIS — F32.A DEPRESSION, UNSPECIFIED DEPRESSION TYPE: ICD-10-CM

## 2024-04-09 DIAGNOSIS — I10 PRIMARY HYPERTENSION: ICD-10-CM

## 2024-04-09 DIAGNOSIS — R20.9 COLD HANDS: Primary | ICD-10-CM

## 2024-04-09 DIAGNOSIS — K21.9 GASTROESOPHAGEAL REFLUX DISEASE WITHOUT ESOPHAGITIS: ICD-10-CM

## 2024-04-09 DIAGNOSIS — Z76.89 ENCOUNTER TO ESTABLISH CARE: ICD-10-CM

## 2024-04-09 DIAGNOSIS — G25.81 RESTLESS LEG SYNDROME: ICD-10-CM

## 2024-04-09 DIAGNOSIS — R20.9 COLD HANDS: ICD-10-CM

## 2024-04-09 DIAGNOSIS — G47.9 SLEEPING DIFFICULTY: ICD-10-CM

## 2024-04-09 PROBLEM — Z90.49 S/P LAPAROSCOPIC CHOLECYSTECTOMY: Status: RESOLVED | Noted: 2019-04-12 | Resolved: 2024-04-09

## 2024-04-09 PROBLEM — O09.523 ADVANCED MATERNAL AGE IN MULTIGRAVIDA, THIRD TRIMESTER: Status: RESOLVED | Noted: 2021-08-05 | Resolved: 2024-04-09

## 2024-04-09 PROBLEM — Z34.90 PREGNANT: Status: RESOLVED | Noted: 2021-08-05 | Resolved: 2024-04-09

## 2024-04-09 PROBLEM — Z34.90 PREGNANCY: Status: RESOLVED | Noted: 2021-09-22 | Resolved: 2024-04-09

## 2024-04-09 PROBLEM — R03.0 ELEVATED BP WITHOUT DIAGNOSIS OF HYPERTENSION: Status: RESOLVED | Noted: 2019-04-09 | Resolved: 2024-04-09

## 2024-04-09 RX ORDER — OMEPRAZOLE 40 MG/1
40 CAPSULE, DELAYED RELEASE ORAL DAILY
Qty: 30 CAPSULE | Refills: 0 | Status: SHIPPED | OUTPATIENT
Start: 2024-04-09

## 2024-04-09 RX ORDER — OLMESARTAN MEDOXOMIL 20 MG/1
20 TABLET ORAL DAILY
COMMUNITY
End: 2024-04-09 | Stop reason: SDUPTHER

## 2024-04-09 RX ORDER — OLMESARTAN MEDOXOMIL 20 MG/1
20 TABLET ORAL DAILY
Qty: 30 TABLET | Refills: 0 | Status: SHIPPED | OUTPATIENT
Start: 2024-04-09

## 2024-04-09 RX ORDER — DESVENLAFAXINE 100 MG/1
100 TABLET, EXTENDED RELEASE ORAL DAILY
Qty: 30 TABLET | Refills: 0 | Status: SHIPPED | OUTPATIENT
Start: 2024-04-09

## 2024-04-09 RX ORDER — RIMEGEPANT SULFATE 75 MG/75MG
TABLET, ORALLY DISINTEGRATING ORAL
COMMUNITY

## 2024-04-09 RX ORDER — PRAMIPEXOLE DIHYDROCHLORIDE 0.25 MG/1
0.25 TABLET ORAL DAILY
Qty: 30 TABLET | Refills: 0 | Status: SHIPPED | OUTPATIENT
Start: 2024-04-09

## 2024-04-09 RX ORDER — TRAZODONE HYDROCHLORIDE 100 MG/1
100 TABLET ORAL NIGHTLY
Qty: 30 TABLET | Refills: 0 | Status: SHIPPED | OUTPATIENT
Start: 2024-04-09

## 2024-04-09 RX ORDER — PRAMIPEXOLE DIHYDROCHLORIDE 0.25 MG/1
0.25 TABLET ORAL DAILY
COMMUNITY
End: 2024-04-09 | Stop reason: SDUPTHER

## 2024-04-09 SDOH — ECONOMIC STABILITY: FOOD INSECURITY: WITHIN THE PAST 12 MONTHS, THE FOOD YOU BOUGHT JUST DIDN'T LAST AND YOU DIDN'T HAVE MONEY TO GET MORE.: PATIENT DECLINED

## 2024-04-09 SDOH — ECONOMIC STABILITY: TRANSPORTATION INSECURITY
IN THE PAST 12 MONTHS, HAS LACK OF TRANSPORTATION KEPT YOU FROM MEETINGS, WORK, OR FROM GETTING THINGS NEEDED FOR DAILY LIVING?: PATIENT DECLINED

## 2024-04-09 SDOH — ECONOMIC STABILITY: HOUSING INSECURITY
IN THE LAST 12 MONTHS, WAS THERE A TIME WHEN YOU DID NOT HAVE A STEADY PLACE TO SLEEP OR SLEPT IN A SHELTER (INCLUDING NOW)?: PATIENT DECLINED

## 2024-04-09 SDOH — ECONOMIC STABILITY: INCOME INSECURITY: HOW HARD IS IT FOR YOU TO PAY FOR THE VERY BASICS LIKE FOOD, HOUSING, MEDICAL CARE, AND HEATING?: PATIENT DECLINED

## 2024-04-09 SDOH — ECONOMIC STABILITY: FOOD INSECURITY: WITHIN THE PAST 12 MONTHS, YOU WORRIED THAT YOUR FOOD WOULD RUN OUT BEFORE YOU GOT MONEY TO BUY MORE.: PATIENT DECLINED

## 2024-04-09 ASSESSMENT — PATIENT HEALTH QUESTIONNAIRE - PHQ9
SUM OF ALL RESPONSES TO PHQ9 QUESTIONS 1 & 2: 6
10. IF YOU CHECKED OFF ANY PROBLEMS, HOW DIFFICULT HAVE THESE PROBLEMS MADE IT FOR YOU TO DO YOUR WORK, TAKE CARE OF THINGS AT HOME, OR GET ALONG WITH OTHER PEOPLE: EXTREMELY DIFFICULT
9. THOUGHTS THAT YOU WOULD BE BETTER OFF DEAD, OR OF HURTING YOURSELF: MORE THAN HALF THE DAYS
4. FEELING TIRED OR HAVING LITTLE ENERGY: NEARLY EVERY DAY
4. FEELING TIRED OR HAVING LITTLE ENERGY: NEARLY EVERY DAY
8. MOVING OR SPEAKING SO SLOWLY THAT OTHER PEOPLE COULD HAVE NOTICED. OR THE OPPOSITE - BEING SO FIDGETY OR RESTLESS THAT YOU HAVE BEEN MOVING AROUND A LOT MORE THAN USUAL: NOT AT ALL
3. TROUBLE FALLING OR STAYING ASLEEP: NOT AT ALL
SUM OF ALL RESPONSES TO PHQ9 QUESTIONS 1 & 2: 4
8. MOVING OR SPEAKING SO SLOWLY THAT OTHER PEOPLE COULD HAVE NOTICED. OR THE OPPOSITE - BEING SO FIDGETY OR RESTLESS THAT YOU HAVE BEEN MOVING AROUND A LOT MORE THAN USUAL: NOT AT ALL
6. FEELING BAD ABOUT YOURSELF - OR THAT YOU ARE A FAILURE OR HAVE LET YOURSELF OR YOUR FAMILY DOWN: NEARLY EVERY DAY
7. TROUBLE CONCENTRATING ON THINGS, SUCH AS READING THE NEWSPAPER OR WATCHING TELEVISION: NEARLY EVERY DAY
2. FEELING DOWN, DEPRESSED OR HOPELESS: MORE THAN HALF THE DAYS
10. IF YOU CHECKED OFF ANY PROBLEMS, HOW DIFFICULT HAVE THESE PROBLEMS MADE IT FOR YOU TO DO YOUR WORK, TAKE CARE OF THINGS AT HOME, OR GET ALONG WITH OTHER PEOPLE: EXTREMELY DIFFICULT
9. THOUGHTS THAT YOU WOULD BE BETTER OFF DEAD, OR OF HURTING YOURSELF: MORE THAN HALF THE DAYS
SUM OF ALL RESPONSES TO PHQ QUESTIONS 1-9: 13
2. FEELING DOWN, DEPRESSED OR HOPELESS: NEARLY EVERY DAY
5. POOR APPETITE OR OVEREATING: NOT AT ALL
8. MOVING OR SPEAKING SO SLOWLY THAT OTHER PEOPLE COULD HAVE NOTICED. OR THE OPPOSITE, BEING SO FIGETY OR RESTLESS THAT YOU HAVE BEEN MOVING AROUND A LOT MORE THAN USUAL: NOT AT ALL
SUM OF ALL RESPONSES TO PHQ QUESTIONS 1-9: 17
6. FEELING BAD ABOUT YOURSELF - OR THAT YOU ARE A FAILURE OR HAVE LET YOURSELF OR YOUR FAMILY DOWN: NEARLY EVERY DAY
6. FEELING BAD ABOUT YOURSELF - OR THAT YOU ARE A FAILURE OR HAVE LET YOURSELF OR YOUR FAMILY DOWN: NEARLY EVERY DAY
SUM OF ALL RESPONSES TO PHQ QUESTIONS 1-9: 17
3. TROUBLE FALLING OR STAYING ASLEEP: NOT AT ALL
3. TROUBLE FALLING OR STAYING ASLEEP: NOT AT ALL
2. FEELING DOWN, DEPRESSED OR HOPELESS: MORE THAN HALF THE DAYS
5. POOR APPETITE OR OVEREATING: NOT AT ALL
SUM OF ALL RESPONSES TO PHQ QUESTIONS 1-9: 15
10. IF YOU CHECKED OFF ANY PROBLEMS, HOW DIFFICULT HAVE THESE PROBLEMS MADE IT FOR YOU TO DO YOUR WORK, TAKE CARE OF THINGS AT HOME, OR GET ALONG WITH OTHER PEOPLE: EXTREMELY DIFFICULT
5. POOR APPETITE OR OVEREATING: NOT AT ALL
7. TROUBLE CONCENTRATING ON THINGS, SUCH AS READING THE NEWSPAPER OR WATCHING TELEVISION: NEARLY EVERY DAY
10. IF YOU CHECKED OFF ANY PROBLEMS, HOW DIFFICULT HAVE THESE PROBLEMS MADE IT FOR YOU TO DO YOUR WORK, TAKE CARE OF THINGS AT HOME, OR GET ALONG WITH OTHER PEOPLE: EXTREMELY DIFFICULT
SUM OF ALL RESPONSES TO PHQ QUESTIONS 1-9: 15
SUM OF ALL RESPONSES TO PHQ QUESTIONS 1-9: 17
1. LITTLE INTEREST OR PLEASURE IN DOING THINGS: MORE THAN HALF THE DAYS
3. TROUBLE FALLING OR STAYING ASLEEP: NOT AT ALL
6. FEELING BAD ABOUT YOURSELF - OR THAT YOU ARE A FAILURE OR HAVE LET YOURSELF OR YOUR FAMILY DOWN: NEARLY EVERY DAY
SUM OF ALL RESPONSES TO PHQ QUESTIONS 1-9: 15
1. LITTLE INTEREST OR PLEASURE IN DOING THINGS: NEARLY EVERY DAY
1. LITTLE INTEREST OR PLEASURE IN DOING THINGS: MORE THAN HALF THE DAYS
SUM OF ALL RESPONSES TO PHQ QUESTIONS 1-9: 15
4. FEELING TIRED OR HAVING LITTLE ENERGY: NEARLY EVERY DAY
9. THOUGHTS THAT YOU WOULD BE BETTER OFF DEAD, OR OF HURTING YOURSELF: MORE THAN HALF THE DAYS
SUM OF ALL RESPONSES TO PHQ QUESTIONS 1-9: 15
2. FEELING DOWN, DEPRESSED OR HOPELESS: NEARLY EVERY DAY
SUM OF ALL RESPONSES TO PHQ QUESTIONS 1-9: 17
7. TROUBLE CONCENTRATING ON THINGS, SUCH AS READING THE NEWSPAPER OR WATCHING TELEVISION: NEARLY EVERY DAY
1. LITTLE INTEREST OR PLEASURE IN DOING THINGS: NEARLY EVERY DAY
5. POOR APPETITE OR OVEREATING: NOT AT ALL
9. THOUGHTS THAT YOU WOULD BE BETTER OFF DEAD, OR OF HURTING YOURSELF: MORE THAN HALF THE DAYS
8. MOVING OR SPEAKING SO SLOWLY THAT OTHER PEOPLE COULD HAVE NOTICED. OR THE OPPOSITE, BEING SO FIGETY OR RESTLESS THAT YOU HAVE BEEN MOVING AROUND A LOT MORE THAN USUAL: NOT AT ALL
4. FEELING TIRED OR HAVING LITTLE ENERGY: NEARLY EVERY DAY
7. TROUBLE CONCENTRATING ON THINGS, SUCH AS READING THE NEWSPAPER OR WATCHING TELEVISION: NEARLY EVERY DAY
SUM OF ALL RESPONSES TO PHQ9 QUESTIONS 1 & 2: 6

## 2024-04-09 ASSESSMENT — COLUMBIA-SUICIDE SEVERITY RATING SCALE - C-SSRS
6. IN YOUR LIFETIME, HAVE YOU EVER DONE ANYTHING, STARTED TO DO ANYTHING, OR PREPARED TO DO ANYTHING TO END YOUR LIFE?: YES
2. IN THE PAST MONTH, HAVE YOU ACTUALLY HAD ANY THOUGHTS OF KILLING YOURSELF?: NO
1. IN THE PAST MONTH, HAVE YOU WISHED YOU WERE DEAD OR WISHED YOU COULD GO TO SLEEP AND NOT WAKE UP?: YES
7. DID THIS OCCUR IN THE LAST THREE MONTHS: NO

## 2024-04-09 NOTE — PROGRESS NOTES
\"Have you been to the ER, urgent care clinic since your last visit?  Hospitalized since your last visit?\"    NO    “Have you seen or consulted any other health care providers outside of [facility] since your last visit?”    NO    Have you had a mammogram?”   NO            “Have you had a colorectal cancer screening such as a colonoscopy/FIT/Cologuard?    NO           Chief Complaint   Patient presents with    Establish Care     BP (!) 141/90 (Site: Right Upper Arm, Position: Sitting, Cuff Size: Large Adult)   Pulse 77   Temp 98.2 °F (36.8 °C) (Temporal)   Resp 18   Ht 1.727 m (5' 8\")   Wt 71.2 kg (157 lb)   SpO2 100%   BMI 23.87 kg/m²

## 2024-04-09 NOTE — PROGRESS NOTES
Subjective  Chief Complaint   Patient presents with    [facility]     HPI:  Nay Zavala is a 48 y.o. female with medical problems as listed below who presents to [facility].     Past medical history: ADD, depression, sleep difficulty, GERD, HTN  Medications: trazodone (taking 50mg sometimes), Nurtec PRN, pramipexole, omeprazole, olmesartan, desvenlafaxine. Adderall XR 30mg daily, Adderall 10mg daily  Allergies:  Specialists: None  Surgical history: See UTD list.   Family history: See UTD list.   Social history: Former smoker, quit in 2014, no alcohol, no drugs.     Feels abnormally cold at times. She is wondering about this.     Depression: She has some things going on in her life. She does admit to some thoughts of suicide. She has seen Psychiatry in the past. Not currently seeing counseling.     ADD: Diagnosed over 10 years ago.     Patient Active Problem List   Diagnosis    History of gastric restrictive surgery    Depression    Attention deficit disorder (ADD) in adult    Chronic hypertension with superimposed preeclampsia    Gastroesophageal reflux disease without esophagitis    Restless leg syndrome    Primary hypertension     Family History   Problem Relation Age of Onset    Diabetes Mother     Thyroid Disease Mother     GERD Mother     Breast Cancer Mother     High Blood Pressure Mother     Depression Maternal Grandmother     Breast Cancer Maternal Aunt     Other Maternal Aunt         MS    Breast Cancer Maternal Aunt     Hypertension Neg Hx       Social History     Tobacco Use    Smoking status: Former     Current packs/day: 0.00     Average packs/day: 0.3 packs/day for 8.0 years (2.0 ttl pk-yrs)     Types: Cigarettes     Quit date: 4/11/2014     Years since quitting: 10.0    Smokeless tobacco: Never   Substance Use Topics    Alcohol use: Not Currently    Drug use: Not Currently     Current Outpatient Medications on File Prior to Visit   Medication Sig Dispense Refill    rimegepant sulfate

## 2024-04-22 PROBLEM — I10 PRIMARY HYPERTENSION: Status: ACTIVE | Noted: 2024-04-22

## 2024-07-19 DIAGNOSIS — I10 PRIMARY HYPERTENSION: ICD-10-CM

## 2024-07-22 NOTE — TELEPHONE ENCOUNTER
Requested Prescriptions     Pending Prescriptions Disp Refills    olmesartan (BENICAR) 20 MG tablet [Pharmacy Med Name: OLMESARTAN MEDOXOMIL 20 MG TAB] 0 tablet 0     Sig: TAKE 1 TABLET BY MOUTH EVERY DAY

## 2024-07-23 RX ORDER — OLMESARTAN MEDOXOMIL 20 MG/1
20 TABLET ORAL DAILY
Qty: 90 TABLET | Refills: 0 | Status: SHIPPED | OUTPATIENT
Start: 2024-07-23

## 2024-07-23 NOTE — TELEPHONE ENCOUNTER
Can we call patient to schedule blood pressure follow up? It looks like I wanted her to return some time in May. I sent a 3 month supply of her blood pressure medication in the meantime.

## 2024-08-10 DIAGNOSIS — G47.9 SLEEPING DIFFICULTY: ICD-10-CM

## 2025-04-05 DIAGNOSIS — I10 PRIMARY HYPERTENSION: ICD-10-CM

## 2025-04-07 RX ORDER — OLMESARTAN MEDOXOMIL 20 MG/1
20 TABLET ORAL DAILY
Qty: 30 TABLET | Refills: 0 | Status: SHIPPED | OUTPATIENT
Start: 2025-04-07

## 2025-04-07 NOTE — TELEPHONE ENCOUNTER
Requested Prescriptions     Pending Prescriptions Disp Refills    olmesartan (BENICAR) 20 MG tablet [Pharmacy Med Name: OLMESARTAN MEDOXOMIL 20 MG TAB] 30 tablet      Sig: TAKE 1 TABLET BY MOUTH EVERY DAY

## 2025-04-10 DIAGNOSIS — K21.9 GASTROESOPHAGEAL REFLUX DISEASE WITHOUT ESOPHAGITIS: ICD-10-CM

## 2025-04-14 RX ORDER — OMEPRAZOLE 40 MG/1
CAPSULE, DELAYED RELEASE ORAL DAILY
Qty: 90 CAPSULE | Refills: 0 | Status: SHIPPED | OUTPATIENT
Start: 2025-04-14

## 2025-04-14 NOTE — TELEPHONE ENCOUNTER
Called patient and left message to call office to schedule an appointment.  
Can we call this patient to schedule an appointment? She has not been seen in a year. I sent a 3 month supply of the requested medication in the meantime.   
Requested Prescriptions     Pending Prescriptions Disp Refills    omeprazole (PRILOSEC) 40 MG delayed release capsule [Pharmacy Med Name: OMEPRAZOLE DR 40 MG CAPSULE] 90 capsule 1     Sig: TAKE 1 CAPSULE BY MOUTH EVERY DAY      
None known

## 2025-04-28 ENCOUNTER — COMMUNITY OUTREACH (OUTPATIENT)
Facility: CLINIC | Age: 50
End: 2025-04-28

## 2025-05-15 ENCOUNTER — HOSPITAL ENCOUNTER (EMERGENCY)
Facility: HOSPITAL | Age: 50
End: 2025-05-15
Payer: COMMERCIAL

## 2025-05-15 ENCOUNTER — HOSPITAL ENCOUNTER (OUTPATIENT)
Facility: HOSPITAL | Age: 50
Discharge: HOME | End: 2025-05-18
Payer: COMMERCIAL

## 2025-05-15 DIAGNOSIS — Z12.31 SCREENING MAMMOGRAM FOR BREAST CANCER: ICD-10-CM

## 2025-05-15 PROCEDURE — 77067 SCR MAMMO BI INCL CAD: CPT

## 2025-05-16 DIAGNOSIS — I10 PRIMARY HYPERTENSION: ICD-10-CM

## 2025-05-16 NOTE — TELEPHONE ENCOUNTER
Requested Prescriptions     Pending Prescriptions Disp Refills    olmesartan (BENICAR) 20 MG tablet [Pharmacy Med Name: OLMESARTAN MEDOXOMIL 20 MG TAB] 30 tablet 0     Sig: TAKE 1 TABLET BY MOUTH EVERY DAY

## 2025-05-20 RX ORDER — OLMESARTAN MEDOXOMIL 20 MG/1
20 TABLET ORAL DAILY
Qty: 90 TABLET | Refills: 0 | Status: SHIPPED | OUTPATIENT
Start: 2025-05-20

## 2025-05-22 ENCOUNTER — OFFICE VISIT (OUTPATIENT)
Facility: CLINIC | Age: 50
End: 2025-05-22
Payer: COMMERCIAL

## 2025-05-22 VITALS
TEMPERATURE: 98 F | WEIGHT: 149 LBS | HEART RATE: 83 BPM | BODY MASS INDEX: 22.58 KG/M2 | OXYGEN SATURATION: 100 % | SYSTOLIC BLOOD PRESSURE: 139 MMHG | RESPIRATION RATE: 18 BRPM | DIASTOLIC BLOOD PRESSURE: 85 MMHG | HEIGHT: 68 IN

## 2025-05-22 DIAGNOSIS — Z13.220 SCREENING CHOLESTEROL LEVEL: ICD-10-CM

## 2025-05-22 DIAGNOSIS — G25.81 RESTLESS LEG SYNDROME: ICD-10-CM

## 2025-05-22 DIAGNOSIS — I10 PRIMARY HYPERTENSION: Primary | ICD-10-CM

## 2025-05-22 DIAGNOSIS — Z11.59 ENCOUNTER FOR HEPATITIS C SCREENING TEST FOR LOW RISK PATIENT: ICD-10-CM

## 2025-05-22 DIAGNOSIS — R87.612 LGSIL ON PAP SMEAR OF CERVIX: ICD-10-CM

## 2025-05-22 DIAGNOSIS — Z12.11 COLON CANCER SCREENING: ICD-10-CM

## 2025-05-22 DIAGNOSIS — F33.9 RECURRENT MAJOR DEPRESSIVE DISORDER, REMISSION STATUS UNSPECIFIED: ICD-10-CM

## 2025-05-22 PROBLEM — O11.9 CHRONIC HYPERTENSION WITH SUPERIMPOSED PREECLAMPSIA: Status: RESOLVED | Noted: 2021-09-23 | Resolved: 2025-05-22

## 2025-05-22 PROCEDURE — 3075F SYST BP GE 130 - 139MM HG: CPT | Performed by: FAMILY MEDICINE

## 2025-05-22 PROCEDURE — 99214 OFFICE O/P EST MOD 30 MIN: CPT | Performed by: FAMILY MEDICINE

## 2025-05-22 PROCEDURE — 3079F DIAST BP 80-89 MM HG: CPT | Performed by: FAMILY MEDICINE

## 2025-05-22 RX ORDER — PRAMIPEXOLE DIHYDROCHLORIDE 0.25 MG/1
0.25 TABLET ORAL DAILY
Qty: 90 TABLET | Refills: 1 | Status: SHIPPED | OUTPATIENT
Start: 2025-05-22

## 2025-05-22 SDOH — ECONOMIC STABILITY: FOOD INSECURITY: WITHIN THE PAST 12 MONTHS, YOU WORRIED THAT YOUR FOOD WOULD RUN OUT BEFORE YOU GOT MONEY TO BUY MORE.: PATIENT DECLINED

## 2025-05-22 SDOH — ECONOMIC STABILITY: TRANSPORTATION INSECURITY
IN THE PAST 12 MONTHS, HAS LACK OF TRANSPORTATION KEPT YOU FROM MEETINGS, WORK, OR FROM GETTING THINGS NEEDED FOR DAILY LIVING?: NO

## 2025-05-22 SDOH — ECONOMIC STABILITY: INCOME INSECURITY: IN THE LAST 12 MONTHS, WAS THERE A TIME WHEN YOU WERE NOT ABLE TO PAY THE MORTGAGE OR RENT ON TIME?: NO

## 2025-05-22 SDOH — ECONOMIC STABILITY: FOOD INSECURITY: WITHIN THE PAST 12 MONTHS, THE FOOD YOU BOUGHT JUST DIDN'T LAST AND YOU DIDN'T HAVE MONEY TO GET MORE.: PATIENT DECLINED

## 2025-05-22 SDOH — ECONOMIC STABILITY: TRANSPORTATION INSECURITY
IN THE PAST 12 MONTHS, HAS THE LACK OF TRANSPORTATION KEPT YOU FROM MEDICAL APPOINTMENTS OR FROM GETTING MEDICATIONS?: NO

## 2025-05-22 ASSESSMENT — PATIENT HEALTH QUESTIONNAIRE - PHQ9
SUM OF ALL RESPONSES TO PHQ QUESTIONS 1-9: 0
1. LITTLE INTEREST OR PLEASURE IN DOING THINGS: NOT AT ALL
2. FEELING DOWN, DEPRESSED OR HOPELESS: NOT AT ALL

## 2025-05-22 NOTE — PROGRESS NOTES
Have you been to the ER, urgent care clinic since your last visit?  Hospitalized since your last visit?   NO    Have you seen or consulted any other health care providers outside our system since your last visit?   NO      “Have you had a colorectal cancer screening such as a colonoscopy/FIT/Cologuard?    NO    No colonoscopy on file  No cologuard on file  No FIT/FOBT on file   No flexible sigmoidoscopy on file          Chief Complaint   Patient presents with    Mass     On bottom of spine    Back Pain     /85 (BP Site: Left Upper Arm, Patient Position: Sitting, BP Cuff Size: Large Adult)   Pulse 83   Temp 98 °F (36.7 °C) (Temporal)   Resp 18   Ht 1.727 m (5' 8\")   Wt 67.6 kg (149 lb)   SpO2 100%   BMI 22.66 kg/m²

## 2025-05-22 NOTE — PROGRESS NOTES
Nay Zavala (:  1975) is a 49 y.o. female,Established patient, here for evaluation of the following chief complaint(s):  Mass (On bottom of spine) and Back Pain      ASSESSMENT/PLAN:  Assessment & Plan  Primary hypertension       Orders:    Comprehensive Metabolic Panel    Lipid Panel    CBC    Restless leg syndrome       Orders:    pramipexole (MIRAPEX) 0.25 MG tablet; Take 1 tablet by mouth daily    Recurrent major depressive disorder, remission status unspecified            Screening cholesterol level       Orders:    Lipid Panel    Encounter for hepatitis C screening test for low risk patient       Orders:    Hepatitis C Antibody    Colon cancer screening       Orders:    Cologuard (Fecal DNA Colorectal Cancer Screening)    LGSIL on Pap smear of cervix              Assessment & Plan  1. Back pain.  - The etiology of the pain is likely muscular, possibly due to inflammation or irritation of the paraspinal muscles.  - Physical therapy and core strengthening exercises was suggested as a potential treatment option.  - A course of anti-inflammatory medication was also recommended.  - She was advised to monitor her symptoms and report any changes.    2. Hypertension.  - Her blood pressure readings have shown improvement since the last visit, with a current reading of 139/85.  - She was advised to consider acquiring a home blood pressure monitor for regular monitoring.  - The current olmesartan regimen will be maintained without any alterations.  - A comprehensive panel, CBC for blood counts, cholesterol, and hepatitis C screening will be conducted today.    3. Depression  - She was strongly encouraged to schedule an appointment with psychiatry, even if it means waiting for several months.  - She has been seeing a nurse practitioner (another PCP?) for medication refills.  - She was advised to continue efforts to establish care with a psychiatrist due to potential medication interactions of her

## 2025-06-05 DIAGNOSIS — F32.A DEPRESSION, UNSPECIFIED DEPRESSION TYPE: ICD-10-CM

## 2025-06-05 NOTE — TELEPHONE ENCOUNTER
Requested Prescriptions     Pending Prescriptions Disp Refills    desvenlafaxine succinate (PRISTIQ) 100 MG TB24 extended release tablet [Pharmacy Med Name: DESVENLAFAXINE SUCCNT ER 100MG] 30 tablet 0     Sig: TAKE 1 TABLET BY MOUTH EVERY DAY

## 2025-06-09 LAB — NONINV COLON CA DNA+OCC BLD SCRN STL QL: NEGATIVE

## 2025-06-09 RX ORDER — DESVENLAFAXINE 100 MG/1
100 TABLET, EXTENDED RELEASE ORAL DAILY
Qty: 30 TABLET | Refills: 0 | Status: SHIPPED | OUTPATIENT
Start: 2025-06-09

## 2025-06-11 ENCOUNTER — RESULTS FOLLOW-UP (OUTPATIENT)
Facility: CLINIC | Age: 50
End: 2025-06-11

## 2025-06-20 DIAGNOSIS — G47.9 SLEEPING DIFFICULTY: ICD-10-CM

## 2025-06-23 RX ORDER — TRAZODONE HYDROCHLORIDE 100 MG/1
100 TABLET ORAL NIGHTLY
Qty: 30 TABLET | Refills: 0 | OUTPATIENT
Start: 2025-06-23

## 2025-06-25 DIAGNOSIS — G47.9 SLEEPING DIFFICULTY: ICD-10-CM

## 2025-06-26 RX ORDER — TRAZODONE HYDROCHLORIDE 100 MG/1
100 TABLET ORAL
Qty: 30 TABLET | Refills: 5 | OUTPATIENT
Start: 2025-06-26

## 2025-06-27 ENCOUNTER — PATIENT MESSAGE (OUTPATIENT)
Facility: CLINIC | Age: 50
End: 2025-06-27

## 2025-06-27 DIAGNOSIS — G47.9 SLEEPING DIFFICULTY: ICD-10-CM

## 2025-06-30 RX ORDER — TRAZODONE HYDROCHLORIDE 100 MG/1
100 TABLET ORAL NIGHTLY
Qty: 90 TABLET | Refills: 1 | OUTPATIENT
Start: 2025-06-30

## 2025-06-30 NOTE — TELEPHONE ENCOUNTER
Requested Prescriptions     Pending Prescriptions Disp Refills    traZODone (DESYREL) 100 MG tablet 30 tablet 0     Sig: Take 1 tablet by mouth nightly

## 2025-07-01 RX ORDER — TRAZODONE HYDROCHLORIDE 100 MG/1
100 TABLET ORAL NIGHTLY
Qty: 90 TABLET | Refills: 0 | Status: SHIPPED | OUTPATIENT
Start: 2025-07-01

## 2025-07-15 DIAGNOSIS — K21.9 GASTROESOPHAGEAL REFLUX DISEASE WITHOUT ESOPHAGITIS: ICD-10-CM

## 2025-07-16 NOTE — TELEPHONE ENCOUNTER
Requested Prescriptions     Pending Prescriptions Disp Refills    omeprazole (PRILOSEC) 40 MG delayed release capsule [Pharmacy Med Name: OMEPRAZOLE DR 40 MG CAPSULE] 90 capsule 0     Sig: TAKE 1 CAPSULE BY MOUTH EVERY DAY

## 2025-07-18 RX ORDER — OMEPRAZOLE 40 MG/1
CAPSULE, DELAYED RELEASE ORAL DAILY
Qty: 90 CAPSULE | Refills: 0 | Status: SHIPPED | OUTPATIENT
Start: 2025-07-18

## 2025-07-19 DIAGNOSIS — F32.A DEPRESSION, UNSPECIFIED DEPRESSION TYPE: ICD-10-CM

## 2025-07-22 NOTE — TELEPHONE ENCOUNTER
My chart ticket scheduling has been sent to the patient.        Requested Prescriptions     Pending Prescriptions Disp Refills    desvenlafaxine succinate (PRISTIQ) 100 MG TB24 extended release tablet [Pharmacy Med Name: DESVENLAFAXINE SUCCNT ER 100MG] 90 tablet 1     Sig: TAKE 1 TABLET BY MOUTH EVERY DAY

## 2025-07-23 RX ORDER — DESVENLAFAXINE 100 MG/1
100 TABLET, EXTENDED RELEASE ORAL DAILY
Qty: 90 TABLET | Refills: 0 | Status: SHIPPED | OUTPATIENT
Start: 2025-07-23

## 2025-08-17 DIAGNOSIS — I10 PRIMARY HYPERTENSION: ICD-10-CM

## 2025-08-19 RX ORDER — OLMESARTAN MEDOXOMIL 20 MG/1
20 TABLET ORAL DAILY
Qty: 90 TABLET | Refills: 1 | Status: SHIPPED | OUTPATIENT
Start: 2025-08-19

## (undated) DEVICE — BNDG ADHESIVE FABRIC 2X3.5IN -- CONVERT TO ITEM 357955

## (undated) DEVICE — TOWEL SURG W17XL27IN STD BLU COT NONFENESTRATED PREWASHED

## (undated) DEVICE — ELECTRO LUBE IS A SINGLE PATIENT USE DEVICE THAT IS INTENDED TO BE USED ON ELECTROSURGICAL ELECTRODES TO REDUCE STICKING.: Brand: KEY SURGICAL ELECTRO LUBE

## (undated) DEVICE — C-SECTION: Brand: MEDLINE INDUSTRIES, INC.

## (undated) DEVICE — REM POLYHESIVE ADULT PATIENT RETURN ELECTRODE: Brand: VALLEYLAB

## (undated) DEVICE — CARTRIDGE CLP LIG HEMLOK GRN --

## (undated) DEVICE — (D)PREP SKN CHLRAPRP APPL 26ML -- CONVERT TO ITEM 371833

## (undated) DEVICE — SOL IRR SOD CL 0.9% 1000ML BTL --

## (undated) DEVICE — VISUALIZATION SYSTEM: Brand: CLEARIFY

## (undated) DEVICE — SUTURE VCRL SZ 0 L36IN ABSRB UD L40MM CT 1/2 CIR TAPERPOINT J958H

## (undated) DEVICE — SUT VCRL + 3 27IN KS UND --

## (undated) DEVICE — APPLICATOR SCRB 26ML TEAL STRL -- CHLORAPREP 26ML

## (undated) DEVICE — BLADELESS OPTICAL TROCAR WITH FIXATION CANNULA: Brand: VERSAPORT

## (undated) DEVICE — DRAPE,REIN 53X77,STERILE: Brand: MEDLINE

## (undated) DEVICE — 3M™ IOBAN™ 2 ANTIMICROBIAL INCISE DRAPE 6648EZ: Brand: IOBAN™ 2

## (undated) DEVICE — STERILE POLYISOPRENE POWDER-FREE SURGICAL GLOVES WITH EMOLLIENT COATING: Brand: PROTEXIS

## (undated) DEVICE — AIRSEAL BIFURCATED SMOKE EVAC FILTERED TUBE SET: Brand: AIRSEAL

## (undated) DEVICE — DEVICE TRNSF SPIK STL 2008S] MICROTEK MEDICAL INC]

## (undated) DEVICE — TRAY URIN CATH PED 16FR BLLN 5CC INDWL STR TIP INF CTRL

## (undated) DEVICE — SUTURE MCRYL SZ 4-0 L27IN ABSRB UD L19MM PS-2 1/2 CIR PRIM Y426H

## (undated) DEVICE — SOL IRRIGATION INJ NACL 0.9% 500ML BTL

## (undated) DEVICE — STRIP,CLOSURE,WOUND,MEDI-STRIP,1/2X4: Brand: MEDLINE

## (undated) DEVICE — AGENT HEMSTAT 5GM ARISTA AH

## (undated) DEVICE — TIP COVER ACCESSORY

## (undated) DEVICE — BLADELESS OBTURATOR: Brand: WECK VISTA

## (undated) DEVICE — BLADELESS OPTICAL TROCAR WITH FIXATION CANNULA: Brand: VERSAONE

## (undated) DEVICE — LIGHT HANDLE: Brand: DEVON

## (undated) DEVICE — INFECTION CONTROL KIT SYS

## (undated) DEVICE — DERMABOND SKIN ADH 0.7ML -- DERMABOND ADVANCED 12/BX

## (undated) DEVICE — SEAL UNIV 5-8MM DISP BX/10 -- DA VINCI XI - SNGL USE

## (undated) DEVICE — DRAPE C-SECTION W/WINDOW --

## (undated) DEVICE — SUTURE SZ 0 27IN 5/8 CIR UR-6  TAPER PT VIOLET ABSRB VICRYL J603H

## (undated) DEVICE — SURGICAL PROCEDURE KIT GEN LAPAROSCOPY LF

## (undated) DEVICE — MASTISOL ADHESIVE LIQ 2/3ML

## (undated) DEVICE — DEVON™ KNEE AND BODY STRAP 60" X 3" (1.5 M X 7.6 CM): Brand: DEVON

## (undated) DEVICE — GARMENT CMPR STD UNV CALF FLWT -- RN VENTILATE NS DISP 17- IN

## (undated) DEVICE — Device

## (undated) DEVICE — HANDLE SURG LIGHT OB

## (undated) DEVICE — NEEDLE HYPO 22GA L1.5IN BLK S STL HUB POLYPR SHLD REG BVL

## (undated) DEVICE — SPECIMEN RETRIEVAL POUCH: Brand: ENDO CATCH GOLD

## (undated) DEVICE — COVER,MAYO STAND,STERILE: Brand: MEDLINE

## (undated) DEVICE — 3000CC GUARDIAN II: Brand: GUARDIAN

## (undated) DEVICE — ARM DRAPE

## (undated) DEVICE — BAG,SPONGE COUNTER,CLEAR,50/BX,5BX/CS: Brand: MEDLINE

## (undated) DEVICE — STERILE POLYISOPRENE POWDER-FREE SURGICAL GLOVES: Brand: PROTEXIS